# Patient Record
Sex: FEMALE | Race: WHITE | NOT HISPANIC OR LATINO | Employment: PART TIME | URBAN - METROPOLITAN AREA
[De-identification: names, ages, dates, MRNs, and addresses within clinical notes are randomized per-mention and may not be internally consistent; named-entity substitution may affect disease eponyms.]

---

## 2017-03-16 ENCOUNTER — TRANSFERRED RECORDS (OUTPATIENT)
Dept: HEALTH INFORMATION MANAGEMENT | Facility: CLINIC | Age: 47
End: 2017-03-16

## 2017-09-27 ENCOUNTER — MEDICAL CORRESPONDENCE (OUTPATIENT)
Dept: HEALTH INFORMATION MANAGEMENT | Facility: CLINIC | Age: 47
End: 2017-09-27

## 2017-10-30 ENCOUNTER — OFFICE VISIT (OUTPATIENT)
Dept: NEUROSURGERY | Facility: CLINIC | Age: 47
End: 2017-10-30

## 2017-10-30 VITALS
HEIGHT: 66 IN | OXYGEN SATURATION: 97 % | TEMPERATURE: 98.4 F | BODY MASS INDEX: 27.1 KG/M2 | SYSTOLIC BLOOD PRESSURE: 130 MMHG | DIASTOLIC BLOOD PRESSURE: 86 MMHG | HEART RATE: 94 BPM | RESPIRATION RATE: 20 BRPM | WEIGHT: 168.6 LBS

## 2017-10-30 DIAGNOSIS — M54.42 CHRONIC RIGHT-SIDED LOW BACK PAIN WITH BILATERAL SCIATICA: Primary | ICD-10-CM

## 2017-10-30 DIAGNOSIS — M54.41 CHRONIC RIGHT-SIDED LOW BACK PAIN WITH BILATERAL SCIATICA: Primary | ICD-10-CM

## 2017-10-30 DIAGNOSIS — G89.29 CHRONIC RIGHT-SIDED LOW BACK PAIN WITH BILATERAL SCIATICA: Primary | ICD-10-CM

## 2017-10-30 RX ORDER — TRAZODONE HYDROCHLORIDE 50 MG/1
1-2 TABLET, FILM COATED ORAL DAILY
COMMUNITY
Start: 2016-11-30 | End: 2018-06-28

## 2017-10-30 RX ORDER — ALBUTEROL SULFATE 90 UG/1
2 AEROSOL, METERED RESPIRATORY (INHALATION) PRN
COMMUNITY
Start: 2017-02-13

## 2017-10-30 RX ORDER — BUPROPION HYDROCHLORIDE 300 MG/1
300 TABLET ORAL DAILY
COMMUNITY
Start: 2017-10-03 | End: 2018-01-12

## 2017-10-30 RX ORDER — MULTIVIT WITH CALCIUM,IRON,MIN 18MG-0.4MG
1 TABLET ORAL EVERY MORNING
COMMUNITY

## 2017-10-30 RX ORDER — DEXTROAMPHETAMINE SACCHARATE, AMPHETAMINE ASPARTATE MONOHYDRATE, DEXTROAMPHETAMINE SULFATE AND AMPHETAMINE SULFATE 7.5; 7.5; 7.5; 7.5 MG/1; MG/1; MG/1; MG/1
20 CAPSULE, EXTENDED RELEASE ORAL EVERY MORNING
COMMUNITY
Start: 2016-11-30

## 2017-10-30 ASSESSMENT — PAIN SCALES - GENERAL: PAINLEVEL: MODERATE PAIN (5)

## 2017-10-30 NOTE — LETTER
"10/30/2017     RE: Phyllis Vela  [address] [facility] [address]     Dear Colleague,    Thank you for referring your patient, Phyllis Vela, to the [facility] NEUROSURGERY at [facility]. Please see a copy of my visit note below.    HISTORY AND PHYSICAL EXAM    Chief Complaint   Patient presents with     Consult     Chronic back pain s/p L4-5 right hemilaminectomy        HISTORY OF PRESENT ILLNESS  We saw Ms. Phyllis Vela today in Neurosurgery Clinic for the first time. She is a 47 year old female with a history of chronic back pain. There were no source images available at the time of this visit and much of the history was per the patient. Her pain began many years ago, but this was initially tolerable. This pain worsened following a L4-5 right hemilaminectomy in 2015 for disc herniation secondary to degenerative disc disease. She states she had disc reherniation and now her pain is worse than before her surgery. She has radiation into her right leg that is equally as bad as her back pain. She typically receives 4 injections per year at L5-S1 joint that gives her good relief. When the injections \"wear off\" she describes lack of bladder control with episodes of urinary incontinence. Starting this year, she has intermittent left leg numbness that began following mechanical injuries. This numbness is over the anterolateral thigh bilaterally. She has no groin or abdomen numbness.    She was followed by Dr. Arias who tried her on amitriptyline and nortriptyline. She states this was the best for the control of her pain, but stopped due to side-effects including hair loss and weight gain. She was also seen by Dr. Humphrey Degroot.  She was starting to be [facility], but during this process her insurance changed and this was no longer covered. She has not been seen since April due to life events, but is here now to establish care in " her new health insurance system. She has no physicians currently managing her pain.        Past Medical History:   Diagnosis Date     ADD (attention deficit disorder) 2013     ADHD (attention deficit hyperactivity disorder)      Chronic back pain 2012     DDD (degenerative disc disease), lumbar 2011     Depressive disorder      Generalized anxiety disorder 2011     Head injury     concussion as a child     Hyperglycemia 2010     Hyperglyceridemia 2010     Insomnia 10/31/2013     Lumbar stenosis 2011       Past Surgical History:   Procedure Laterality Date     BACK SURGERY Right 2015    Right L4/5 hemilaminectomy, discectomy     BREAST SURGERY Right 06/10/2004    breast biopsy      SECTION        SECTION        SECTION       CHOLECYSTECTOMY         No family history on file.    Social History     Social History     Marital status:      Spouse name: N/A     Number of children: N/A     Years of education: N/A     Occupational History     Not on file.     Social History Main Topics     Smoking status: Current Every Day Smoker     Packs/day: 0.50     Types: Cigarettes     Start date: 10/30/1990     Smokeless tobacco: Never Used     Alcohol use 1.2 oz/week     2 Cans of beer per week      Comment: WEEKLY     Drug use: No     Sexual activity: Yes     Partners: Male     Other Topics Concern     Not on file     Social History Narrative          Allergies   Allergen Reactions     Sulfa Drugs Hives     Amitriptyline      Other reaction(s): Other, see comments  Hair loss     Lyrica [Pregabalin]      Other reaction(s): Edema,generalized  Other reaction(s): Edema     Nortriptyline      Other reaction(s): Alopecia  Other reaction(s): Other, see comments  Hairloss, increased blood pressure per patient     Amoxicillin Rash     And itching      Clindamycin Rash     Airway impermeant      Gabapentin Rash       Current Outpatient Prescriptions    Medication     buPROPion (WELLBUTRIN XL) 300 MG 24 hr tablet     traZODone (DESYREL) 50 MG tablet     amphetamine-dextroamphetamine (ADDERALL XR) 30 MG per 24 hr capsule     CHROMIUM PO     albuterol (PROAIR HFA/PROVENTIL HFA/VENTOLIN HFA) 108 (90 BASE) MCG/ACT Inhaler     Multiple Vitamins-Minerals (QC WOMENS DAILY MULTIVITAMIN) TABS     cholecalciferol (VITAMIN D3) 1000 UNIT tablet     Calcium-Magnesium-Zinc 333-133-5 MG TABS per tablet     fluticasone (FLONASE) 50 MCG/ACT nasal spray     topiramate (TOPAMAX) 25 MG tablet     cyclobenzaprine (FLEXERIL) 5 MG tablet     No current facility-administered medications for this visit.          REVIEW OF SYSTEMS:  General: Negative for chills/sweats/fever. difficulty sleeping, headache, recent fatigue, or weight gain/loss.  Eyes: Negative for blurred vision, crossed eyes, double vision, recent eye infections, vision flashes, or vision halos.  Ears/Nose/Mouth/Throat: POSITIVE for nosebleeds and sinus problems. Negative for bleeding gums, difficulty swallowing, earache, ear discharge, hearing loss, hoarseness, or tinnitus.  Respiratory:Negative for chronic cough, coughing blood, night sweats, shortness of breath, Tuberculosis, or wheezing.  Cardiovascular: Negative for chest pain, dyspnea at night, heart murmur, palpitations, pacemaker, pacemaker, poor circulation, swollen legs/feet, or varicose veins.  Gastrointestinal: Negative for melena, hematochezia, chronic diarrhea, heartburn, Hepatitis A/B/C, increasing constipation, Liver Disease, nausea, or vomiting.   Genitourinary: POSITIVE for urinary urgency. Negative for Urinary retention, genital discharge, urinary incontinence, or UTI.   Neurological: POSITIVE for numbness in legs. Negative for syncope, headaches, numbness of arms, tingling in hands/arms/legs, memory problems, or seizures.  Psychological: Negative for anxiety, depression, panic attacks, or restlessness.  Skin: Negative for chronic skin itching, color  "changes in hand/feet when cold, poor scarring, non-healing ulcers, skin rashes/hives, unusual moles.  Musculoskeletal: Negative for arthritis, joint swelling in hands/wrists/hips/knees/joints, muscle tenderness in arms/legs, or osteoporosis.  Endocrine: POSITIVE for multiple broken bones (teeth). Negative for excessive thirst/hunger, intolerance for warm rooms, loss of libido, rapid weight gain/loss, galactorrhea, or thyroid issues.  Hematologic/Lymphatic: Negative for easy skin bruising, significant fatigue, prolonged bleeding, tender glands/lymph nodes.  Allergies: POSITIVE for hay fever. Negative for asthma.      PHYSICAL EXAM  /86  Pulse 94  Temp 98.4  F (36.9  C) (Oral)  Resp 20  Ht 1.676 m (5' 6\")  Wt 76.5 kg (168 lb 9.6 oz)  SpO2 97%  Breastfeeding? No  BMI 27.21 kg/m2    General: Awake, alert, oriented. Well nourished, well developed, she is not in any acute distress.  HEENT: Head normocephalic, atraumatic. No carotid bruit. Neck supple. Good range of motion. No palpable thyroid mass.  Heart: Regular rhythm and rate. No murmurs.  Lungs: Clear to auscultation and percussion bilaterally. No ronchi, rales or wheeze.  Abdomen: Soft, non-tender, non-distended. No hepatosplenomegaly.  Extremity: Warm with no clubbing or cyanosis. No lower extremity edema.  Incisions: 3 cm long right paraspinal incision well-healed.    Neurological  Awake, alert and oriented to date, time, place and person. Speech fluent.   Pupils equal, round, reactive to light.  Extraocular movement intact.  Visual Fields are full on confrontation.  Hearing is grossly normal to finger rub.   Facial sensation intact.  Face symmetric.  Tongue midline.  Uvula elevates equally.    Motor: full strength throughout.  Sensation: intact to light touch and pinpoint.  Deep tendon reflexes: 2+ throughout. 2 beat clonus bilaterally. Negative for Pérez's sign. No dysmetria.      ASSESSMENT  47 year old female with a history of chronic low back " pain s/p right L4-5 hemilaminectomy.  Chronic back pain and radiating pain to the right leg.    Patient presents for her above complaints and wishes to establish care now that she has a different insurance plan.  She does not have any physicians or healthcare providers who are managing her pain.    She does have a history of lumbar disc surgery and recurrence of her symptoms.  She was evaluated by Dr. Arias who was not planning any surgical intervention and was treating her conservatively.  He recommended SCS.  No surgical intervention for her lumbar spine was recommended.    I discussed her overall condition and the need for a pain management physician who can manage her pain.  She apparently had good pain control with amitriptyline and nortriptyline.  She may have other oral options which may be helpful for her and allow her to avoid more invasive therapy.    Her imaging should be reviewed to make sure that there are no findings which require surgical attention.  We will try to obtain her images.    We will refer her to our pain management clinic with either Dr. Ochoa or Dr. Aponte to establish care and to proceed with her L4-5, L5-S1 or right SI joint injections.    If they believe she would be a good candidate for spinal cord stimulator implantation and she has exhausted all conservative interventions/options, she will be referred to neuropsych so that the spinal cord stimulation workup can be initiated.      PLAN  1. Referral to Dr. Ochoa or Dr. Aponte for pain management.  Patient needs a pain management physician.  2. Obtain images from her previous facilities.    IAngel, am serving as a scribe to document services personally performed by Felix Floyd MD, PhD, based upon my observations and the provider's statements to me. All documentation has been reviewed and edited by the aforementioned doctor prior to being entered into the official medical record.    I, Felix Floyd, attest that  above named individual is acting in scribe capacity, has observed my performance of the services and has documented them in accordance with my direction. The documentation recorded by the scribe accurately reflects the service I personally performed and the decisions made by me. The document was also partially recorded by me and the entire document was edited by me as well.       65 minutes were spent face to face with the patient of which more than 50% of the time was spent counseling and discussing the above issues regarding diagnosis, differential, treatment options, and steps for further evaluation.      ADDENDUM    IMAGING STUDIES    MRI lumbar spine without/with contrast 6/5/2015 - No official read is available.  Appears to be postoperative.  There is superficial and possibly suprafascial metal artifact (?) on the right side of midline at L4/5 level.  There is disc herniation and perhaps extruded disc at the L4/5 level which is directed inferiorly and towards the right side.  This is smaller than her previous MRI imaging.    MRI lumbar spine without/with contrast 3/16/2015 - No official read is available.  Very large disc herniation and possibly extrusion into the canal at L4/5 level which is taking up significant room in the lumbar spinal canal.  It appears to be more so on the right side but it does take up the majority of the canal.  It is also inferiorly directed from the L4/5 disc space.    She has no recent imaging and MRI lumbar spine appears to be dated back to the time of her surgery.  The herniated and extruded disc at L4/5 was significantly reduced in size postoperatively.  I'm not sure if her treating surgeon addressed this and deemed it non-operative.  There is also a metal artifact or some sort of artifact at the site of her surgery.  As ongoing workup, if she is to have the spinal cord stimulator placed, she may need another MRI lumbar spine and thoracic/cervical spine as well.  She may also  benefit from x-ray of the lower lumbar spine, maybe CT lumbar spine, to look for any foreign objects.    Again, thank you for allowing me to participate in the care of your patient.      Sincerely,    Felix Floyd MD

## 2017-10-30 NOTE — MR AVS SNAPSHOT
"              After Visit Summary   10/30/2017    Phyllis Vela    MRN: [id number]           Patient Information     Date Of Birth          1970        Visit Information        Provider Department      10/30/2017 9:30 AM Felix Floyd MD [facility] Neurosurgery        Today's Diagnoses     Chronic back pain    -  1       Follow-ups after your visit        Additional Services     [facility]       Please call [phone] to make an appointment. Clinic is located: [facility] [address] [place] [address] [id number] [place] [address]      Please complete the following questions:    Procedure/Referral: Right L4-L5, Right L5-S1 or SI injections and overall pain management care    What is your diagnosis for the patient's pain? history of chronic back pain. This pain worsened following a L4-5 right hemilaminectomy in 2015. She states she had disc reherniation and now her pain is worse than before her surgery. She has radiation into her right leg that is equally as bad as her back pain. She typically receives 4 injections per year at L5-S1 that gives her good relief. When the injects \"wear off\" she has increased urinary incontinence. Starting this year, she has intermittent left leg numbness that began following falls. Front. back thight and foot. No groin or abdomen numb. Now left numbness following falls this year. Bladder incontinence.She was followed by Dr. Arias to consider SCS. amatryptoline reduced pain but didn't like side effects. nortriptoline with same results. Right para spnal 3 cm long well healed    What are your specific questions for the pain specialist? Pt would like pain management as well as injections    Are there any red flags that may impact the assessment or management of the patient? None            NEUROPSYCHOLOGY REFERRAL       Your provider has referred you to:    [facility]: Adult Neuropsychology Clinic - [place] [phone] Preferred " Provider: Tala Green Ph.D., LP, ABPP   [email or web address] for spinal cord stimulator evaluation    All scheduling is subject to the client's specific insurance plan & benefits, provider/location availability, and provider clinical specialities.  Please arrive 15 minutes early for your first appointment and bring your completed paperwork.    Please be aware that coverage of these services is subject to the terms and limitations of your health insurance plan.  Call member services at your health plan with any benefit or coverage questions.    Please bring the following to your appointment:  >>   Any x-rays, CTs or MRIs which have been performed.  Contact the facility where they were done to arrange for  prior to your scheduled appointment.  Any new CT, MRI or other procedures ordered by your specialist must be performed at a [facility] or coordinated by your clinic's referral office.    >>   List of current medications   >>   This referral request   >>   Any documents/labs given to you for this referral                  Your next 10 appointments already scheduled     Nov 17, 2017  8:20 AM CST   (Arrive by 8:05 AM)   New Patient Visit with Felix Aponte MD   [facility] ([facility])    [address] [place] [id number] [phone]              Who to contact     Please call your clinic at [phone] to:    Ask questions about your health    Make or cancel appointments    Discuss your medicines    Learn about your test results    Speak to your doctor   If you have compliments or concerns about an experience at your clinic, or if you wish to file a complaint, please contact [facility] Patient Relations at [phone] or email us at [email or web address]         Additional Information About Your Visit        MyChart  "Information     [employer] gives you secure access to your electronic health record. If you see a primary care provider, you can also send messages to your care team and make appointments. If you have questions, please call your primary care clinic.  If you do not have a primary care provider, please call [phone] and they will assist you.      [employer] is an electronic gateway that provides easy, online access to your medical records. With [employer], you can request a clinic appointment, read your test results, renew a prescription or communicate with your care team.     To access your existing account, please contact your [facility] or call [phone] for assistance.        Care EveryWhere ID     This is your Care EveryWhere ID. This could be used by other organizations to access your [place] medical records  [id number]        Your Vitals Were     Pulse Temperature Respirations Height Pulse Oximetry Breastfeeding?    94 98.4  F (36.9  C) (Oral) 20 1.676 m (5' 6\") 97% No    BMI (Body Mass Index)                   27.21 kg/m2            Blood Pressure from Last 3 Encounters:   10/30/17 130/86   12/10/14 (!) 136/92    Weight from Last 3 Encounters:   10/30/17 76.5 kg (168 lb 9.6 oz)              We Performed the Following     [facility]     NEUROPSYCHOLOGY REFERRAL        Primary Care Provider Office Phone # Fax #    [facility] [phone] [address] [place] [address]        Equal Access to Services     ОЛЕГ TARANGO AH: Hadii cece gilmano Socaio, waaxda luqadaha, qaybta kaalmada adeegyada, janine pearl. So [facility] [phone].    ATENCIÓN: Si habla español, tiene a watson disposición servicios gratuitos de asistencia lingüística. Llame al [phone].    We comply with applicable federal civil rights laws and [place] laws. We do not discriminate on the basis of race, color, " national origin, age, disability, sex, sexual orientation, or gender identity.            Thank you!     Thank you for choosing [facility]  for your care. Our goal is always to provide you with excellent care. Hearing back from our patients is one way we can continue to improve our services. Please take a few minutes to complete the written survey that you may receive in the mail after your visit with us. Thank you!             Your Updated Medication List - Protect others around you: Learn how to safely use, store and throw away your medicines at www.disposemymeds.org.          This list is accurate as of: 10/30/17 10:27 AM.  Always use your most recent med list.                   Brand Name Dispense Instructions for use Diagnosis    albuterol 108 (90 BASE) MCG/ACT Inhaler    PROAIR HFA/PROVENTIL HFA/VENTOLIN HFA     Inhale 2 puffs into the lungs as needed        amphetamine-dextroamphetamine 30 MG per 24 hr capsule    ADDERALL XR     Take 1 capsule by mouth as needed        buPROPion 300 MG 24 hr tablet    WELLBUTRIN XL     Take 300 mg by mouth daily        Calcium-Magnesium-Zinc 333-133-5 MG Tabs per tablet      Take 1 tablet by mouth daily        cholecalciferol 1000 UNIT tablet    vitamin D3     Take 1,000 Units by mouth daily        CHROMIUM PO      Take 1 tablet by mouth daily        fluticasone 50 MCG/ACT spray    FLONASE     Spray 2 sprays into both nostrils daily        QC WOMENS DAILY MULTIvitamin Tabs      Take 1 tablet by mouth daily        traZODone 50 MG tablet    DESYREL     Take 1-2 tablets by mouth daily

## 2017-10-30 NOTE — PROGRESS NOTES
"HISTORY AND PHYSICAL EXAM    Chief Complaint   Patient presents with     Consult     Chronic back pain s/p L4-5 right hemilaminectomy        HISTORY OF PRESENT ILLNESS  We saw Ms. Phyllis Vela today in [facility] for the first time. She is a 47 year old female with a history of chronic back pain. There were no source images available at the time of this visit and much of the history was per the patient. Her pain began many years ago, but this was initially tolerable. This pain worsened following a L4-5 right hemilaminectomy in 2015 for disc herniation secondary to degenerative disc disease. She states she had disc reherniation and now her pain is worse than before her surgery. She has radiation into her right leg that is equally as bad as her back pain. She typically receives 4 injections per year at L5-S1 joint that gives her good relief. When the injections \"wear off\" she describes lack of bladder control with episodes of urinary incontinence. Starting this year, she has intermittent left leg numbness that began following mechanical injuries. This numbness is over the anterolateral thigh bilaterally. She has no groin or abdomen numbness.    She was followed by Dr. Arias who tried her on amitriptyline and nortriptyline. She states this was the best for the control of her pain, but stopped due to side-effects including hair loss and weight gain. She was also seen by Dr. Humphrey Degroot.  She was starting to be evaluated for spinal cord stimulator with [facility], but during this process her insurance changed and this was no longer covered. She has not been seen since April due to life events, but is here now to establish care in her new health insurance system. She has no physicians currently managing her pain.        Past Medical History:   Diagnosis Date     ADD (attention deficit disorder) 03/07/2013     ADHD (attention deficit hyperactivity disorder)      Chronic back pain 02/07/2012     DDD " (degenerative disc disease), lumbar 2011     Depressive disorder      Generalized anxiety disorder 2011     Head injury     concussion as a child     Hyperglycemia 2010     Hyperglyceridemia 2010     Insomnia 10/31/2013     Lumbar stenosis 2011       Past Surgical History:   Procedure Laterality Date     BACK SURGERY Right 2015    Right L4/5 hemilaminectomy, discectomy     BREAST SURGERY Right 06/10/2004    breast biopsy      SECTION        SECTION        SECTION       CHOLECYSTECTOMY         No family history on file.    Social History     Social History     Marital status:      Spouse name: N/A     Number of children: N/A     Years of education: N/A     Occupational History     Not on file.     Social History Main Topics     Smoking status: Current Every Day Smoker     Packs/day: 0.50     Types: Cigarettes     Start date: 10/30/1990     Smokeless tobacco: Never Used     Alcohol use 1.2 oz/week     2 Cans of beer per week      Comment: WEEKLY     Drug use: No     Sexual activity: Yes     Partners: Male     Other Topics Concern     Not on file     Social History Narrative          Allergies   Allergen Reactions     Sulfa Drugs Hives     Amitriptyline      Other reaction(s): Other, see comments  Hair loss     Lyrica [Pregabalin]      Other reaction(s): Edema,generalized  Other reaction(s): Edema     Nortriptyline      Other reaction(s): Alopecia  Other reaction(s): Other, see comments  Hairloss, increased blood pressure per patient     Amoxicillin Rash     And itching      Clindamycin Rash     Airway impermeant      Gabapentin Rash       Current Outpatient Prescriptions   Medication     buPROPion (WELLBUTRIN XL) 300 MG 24 hr tablet     traZODone (DESYREL) 50 MG tablet     amphetamine-dextroamphetamine (ADDERALL XR) 30 MG per 24 hr capsule     CHROMIUM PO     albuterol (PROAIR HFA/PROVENTIL HFA/VENTOLIN HFA) 108 (90 BASE) MCG/ACT Inhaler      Multiple Vitamins-Minerals (QC WOMENS DAILY MULTIVITAMIN) TABS     cholecalciferol (VITAMIN D3) 1000 UNIT tablet     Calcium-Magnesium-Zinc 333-133-5 MG TABS per tablet     fluticasone (FLONASE) 50 MCG/ACT nasal spray     topiramate (TOPAMAX) 25 MG tablet     cyclobenzaprine (FLEXERIL) 5 MG tablet     No current facility-administered medications for this visit.          REVIEW OF SYSTEMS:  General: Negative for chills/sweats/fever. difficulty sleeping, headache, recent fatigue, or weight gain/loss.  Eyes: Negative for blurred vision, crossed eyes, double vision, recent eye infections, vision flashes, or vision halos.  Ears/Nose/Mouth/Throat: POSITIVE for nosebleeds and sinus problems. Negative for bleeding gums, difficulty swallowing, earache, ear discharge, hearing loss, hoarseness, or tinnitus.  Respiratory:Negative for chronic cough, coughing blood, night sweats, shortness of breath, Tuberculosis, or wheezing.  Cardiovascular: Negative for chest pain, dyspnea at night, heart murmur, palpitations, pacemaker, pacemaker, poor circulation, swollen legs/feet, or varicose veins.  Gastrointestinal: Negative for melena, hematochezia, chronic diarrhea, heartburn, Hepatitis A/B/C, increasing constipation, Liver Disease, nausea, or vomiting.   Genitourinary: POSITIVE for urinary urgency. Negative for Urinary retention, genital discharge, urinary incontinence, or UTI.   Neurological: POSITIVE for numbness in legs. Negative for syncope, headaches, numbness of arms, tingling in hands/arms/legs, memory problems, or seizures.  Psychological: Negative for anxiety, depression, panic attacks, or restlessness.  Skin: Negative for chronic skin itching, color changes in hand/feet when cold, poor scarring, non-healing ulcers, skin rashes/hives, unusual moles.  Musculoskeletal: Negative for arthritis, joint swelling in hands/wrists/hips/knees/joints, muscle tenderness in arms/legs, or osteoporosis.  Endocrine: POSITIVE for multiple  "broken bones (teeth). Negative for excessive thirst/hunger, intolerance for warm rooms, loss of libido, rapid weight gain/loss, galactorrhea, or thyroid issues.  Hematologic/Lymphatic: Negative for easy skin bruising, significant fatigue, prolonged bleeding, tender glands/lymph nodes.  Allergies: POSITIVE for hay fever. Negative for asthma.      PHYSICAL EXAM  /86  Pulse 94  Temp 98.4  F (36.9  C) (Oral)  Resp 20  Ht 1.676 m (5' 6\")  Wt 76.5 kg (168 lb 9.6 oz)  SpO2 97%  Breastfeeding? No  BMI 27.21 kg/m2    General: Awake, alert, oriented. Well nourished, well developed, she is not in any acute distress.  HEENT: Head normocephalic, atraumatic. No carotid bruit. Neck supple. Good range of motion. No palpable thyroid mass.  Heart: Regular rhythm and rate. No murmurs.  Lungs: Clear to auscultation and percussion bilaterally. No ronchi, rales or wheeze.  Abdomen: Soft, non-tender, non-distended. No hepatosplenomegaly.  Extremity: Warm with no clubbing or cyanosis. No lower extremity edema.  Incisions: 3 cm long right paraspinal incision well-healed.    Neurological  Awake, alert and oriented to date, time, place and person. Speech fluent.   Pupils equal, round, reactive to light.  Extraocular movement intact.  Visual Fields are full on confrontation.  Hearing is grossly normal to finger rub.   Facial sensation intact.  Face symmetric.  Tongue midline.  Uvula elevates equally.    Motor: full strength throughout.  Sensation: intact to light touch and pinpoint.  Deep tendon reflexes: 2+ throughout. 2 beat clonus bilaterally. Negative for Pérez's sign. No dysmetria.      ASSESSMENT  47 year old female with a history of chronic low back pain s/p right L4-5 hemilaminectomy.  Chronic back pain and radiating pain to the right leg.    Patient presents for her above complaints and wishes to establish care now that she has a different insurance plan.  She does not have any physicians or healthcare providers who " are managing her pain.    She does have a history of lumbar disc surgery and recurrence of her symptoms.  She was evaluated by Dr. Arias who was not planning any surgical intervention and was treating her conservatively.  He recommended SCS.  No surgical intervention for her lumbar spine was recommended.    I discussed her overall condition and the need for a pain management physician who can manage her pain.  She apparently had good pain control with amitriptyline and nortriptyline.  She may have other oral options which may be helpful for her and allow her to avoid more invasive therapy.    Her imaging should be reviewed to make sure that there are no findings which require surgical attention.  We will try to obtain her images.    We will refer her to our pain management clinic with either Dr. Ochoa or Dr. Aponte to establish care and to proceed with her L4-5, L5-S1 or right SI joint injections.    If they believe she would be a good candidate for spinal cord stimulator implantation and she has exhausted all conservative interventions/options, she will be referred to neuropsych so that the spinal cord stimulation workup can be initiated.      PLAN  1. Referral to Dr. Ochoa or Dr. Aponte for pain management.  Patient needs a pain management physician.  2. Obtain images from her previous facilities.    IAngel, am serving as a scribe to document services personally performed by Felix Floyd MD, PhD, based upon my observations and the provider's statements to me. All documentation has been reviewed and edited by the aforementioned doctor prior to being entered into the official medical record.    I, Felix Floyd, attest that above named individual is acting in scribe capacity, has observed my performance of the services and has documented them in accordance with my direction. The documentation recorded by the scribe accurately reflects the service I personally performed and the decisions made by  me. The document was also partially recorded by me and the entire document was edited by me as well.       65 minutes were spent face to face with the patient of which more than 50% of the time was spent counseling and discussing the above issues regarding diagnosis, differential, treatment options, and steps for further evaluation.      ADDENDUM    IMAGING STUDIES    MRI lumbar spine without/with contrast 6/5/2015 - No official read is available.  Appears to be postoperative.  There is superficial and possibly suprafascial metal artifact (?) on the right side of midline at L4/5 level.  There is disc herniation and perhaps extruded disc at the L4/5 level which is directed inferiorly and towards the right side.  This is smaller than her previous MRI imaging.    MRI lumbar spine without/with contrast 3/16/2015 - No official read is available.  Very large disc herniation and possibly extrusion into the canal at L4/5 level which is taking up significant room in the lumbar spinal canal.  It appears to be more so on the right side but it does take up the majority of the canal.  It is also inferiorly directed from the L4/5 disc space.    She has no recent imaging and MRI lumbar spine appears to be dated back to the time of her surgery.  The herniated and extruded disc at L4/5 was significantly reduced in size postoperatively.  I'm not sure if her treating surgeon addressed this and deemed it non-operative.  There is also a metal artifact or some sort of artifact at the site of her surgery.  As ongoing workup, if she is to have the spinal cord stimulator placed, she may need another MRI lumbar spine and thoracic/cervical spine as well.  She may also benefit from x-ray of the lower lumbar spine, maybe CT lumbar spine, to look for any foreign objects.

## 2017-11-10 ENCOUNTER — OFFICE VISIT (OUTPATIENT)
Dept: NEUROPSYCHOLOGY | Facility: CLINIC | Age: 47
End: 2017-11-10

## 2017-11-10 DIAGNOSIS — M54.41 CHRONIC RIGHT-SIDED LOW BACK PAIN WITH BILATERAL SCIATICA: ICD-10-CM

## 2017-11-10 DIAGNOSIS — M54.42 CHRONIC RIGHT-SIDED LOW BACK PAIN WITH BILATERAL SCIATICA: ICD-10-CM

## 2017-11-10 DIAGNOSIS — G89.29 CHRONIC RIGHT-SIDED LOW BACK PAIN WITH BILATERAL SCIATICA: ICD-10-CM

## 2017-11-10 DIAGNOSIS — F54 PSYCHOLOGICAL FACTORS AFFECTING MEDICAL CONDITION: Primary | ICD-10-CM

## 2017-11-10 NOTE — TELEPHONE ENCOUNTER
APPT INFO    Date /Time: 11/17/17 8:20AM    Reason for Appt: Chronic back pain    Ref Provider/Clinic: [facility]    Are there internal records? If yes, list: [facility] (referring)    Patient Contact (Y/N) & Call Details: No, recs at .    Action: Faxed cover sheet to  to re. recs      OUTSIDE RECORDS CHECKLIST     CLINIC NAME COMMENTS REC (x) IMG (x)   [facility]  Some transferred recs scanned in Epic 3/2017  Faxed cover sheet to  to re. recs  NEED - L4-5 right hemilaminectomy in 2015

## 2017-11-10 NOTE — MR AVS SNAPSHOT
After Visit Summary   11/10/2017    Phyllis Vela    MRN: [id number]           Patient Information     Date Of Birth          1970        Visit Information        Provider Department      11/10/2017 9:15 AM Tala Green, PhD [facility] Neuropsychology        Today's Diagnoses     Psychological factors affecting medical condition    -  1    Chronic right-sided low back pain with bilateral sciatica           Follow-ups after your visit        Your next 10 appointments already scheduled     Dec 29, 2017   Procedure with Felix Aponte MD   [facility] ([facility])    [address] [place] [address]  5th Floor  [facility] [address] [phone]           Located in the Clinics and Surgery Center at [address] [place] [address].   parking is very convenient and highly recommended.  is a $6 flat rate fee.  Both  and self parkers should enter the main arrival plaza from [facility]; parking attendants will direct you based on your parking preference.              Who to contact     Please call your clinic at [phone] to:    Ask questions about your health    Make or cancel appointments    Discuss your medicines    Learn about your test results    Speak to your doctor   If you have compliments or concerns about an experience at your clinic, or if you wish to file a complaint, please contact [facility] Patient Relations at [phone] or email us at [email or web address]         Additional Information About Your Visit        MyChart Information     [employer] gives you secure access to your electronic health record. If you see a primary care provider, you can also send messages to your care team and make appointments. If you have questions, please call your primary care clinic.  If you do not have a primary care provider, please call [phone] and they will  assist you.      [employer] is an electronic gateway that provides easy, online access to your medical records. With [employer], you can request a clinic appointment, read your test results, renew a prescription or communicate with your care team.     To access your existing account, please contact your [facility] or call [phone] for assistance.        Care EveryWhere ID     This is your Care EveryWhere ID. This could be used by other organizations to access your [place] medical records  [id number]         Blood Pressure from Last 3 Encounters:   11/17/17 130/82   10/30/17 130/86   12/10/14 (!) 136/92    Weight from Last 3 Encounters:   11/17/17 76.6 kg (168 lb 14.4 oz)   10/30/17 76.5 kg (168 lb 9.6 oz)              We Performed the Following     [id number] TEST BY PSYCHOLOGIST/MD, PER HR     C PSYCHIATRIC DIAGNOSTIC EVALUATION        Primary Care Provider Office Phone # Fax #    Litzy [facility] [phone] [address] [place] [address]        Equal Access to Services     [facility]: Hadii aad ku hadasho Soomaali, waaxda luqadaha, qaybta kaalmada adeegyada, waxay fransisco hayhernando womack . So [facility] [phone].    ATENCIÓN: Si habla español, tiene a watson disposición servicios gratuitos de asistencia lingüística. Llame al [phone].    We comply with applicable federal civil rights laws and [place] laws. We do not discriminate on the basis of race, color, national origin, age, disability, sex, sexual orientation, or gender identity.            Thank you!     Thank you for choosing [facility] NEUROPSYCHOLOGY  for your care. Our goal is always to provide you with excellent care. Hearing back from our patients is one way we can continue to improve our services. Please take a few minutes to complete the written survey that you may receive in the mail after your visit with us. Thank you!             Your Updated  Medication List - Protect others around you: Learn how to safely use, store and throw away your medicines at www.disposemymeds.org.          This list is accurate as of: 11/10/17 11:59 PM.  Always use your most recent med list.                   Brand Name Dispense Instructions for use Diagnosis    albuterol 108 (90 BASE) MCG/ACT Inhaler    PROAIR HFA/PROVENTIL HFA/VENTOLIN HFA     Inhale 2 puffs into the lungs as needed        amphetamine-dextroamphetamine 30 MG per 24 hr capsule    ADDERALL XR     Take 1 capsule by mouth as needed        buPROPion 300 MG 24 hr tablet    WELLBUTRIN XL     Take 300 mg by mouth daily        Calcium-Magnesium-Zinc 333-133-5 MG Tabs per tablet      Take 1 tablet by mouth daily        cholecalciferol 1000 UNIT tablet    vitamin D3     Take 1,000 Units by mouth daily        CHROMIUM PO      Take 1 tablet by mouth daily        fluticasone 50 MCG/ACT spray    FLONASE     Spray 2 sprays into both nostrils daily        QC WOMENS DAILY MULTIvitamin Tabs      Take 1 tablet by mouth daily        traZODone 50 MG tablet    DESYREL     Take 1-2 tablets by mouth daily

## 2017-11-11 ENCOUNTER — HEALTH MAINTENANCE LETTER (OUTPATIENT)
Age: 47
End: 2017-11-11

## 2017-11-13 NOTE — TELEPHONE ENCOUNTER
Records Received From:   [employer]     DATE/EXAM/LOCATION  (specify location if different)   Office Notes:  12/14/16, 1/20/17, 3/16/17   Radiology Reports: - xray lumbar 12/14/16   Missing: - lumbar spine image 3/16/15, 12/16/16  - right L4-5 microdiskectomy on 5/8/15  - MRI lumbar 10/5/15 [facility]   - injections

## 2017-11-14 ENCOUNTER — TELEPHONE (OUTPATIENT)
Dept: ANESTHESIOLOGY | Facility: CLINIC | Age: 47
End: 2017-11-14

## 2017-11-14 NOTE — TELEPHONE ENCOUNTER
LPN called pt to see if they were agreeable to change providers to the Nurse Practioner on 11/17.   Pt is a referral [place] for a Spinal Cord Stimulator, and has questions for Dr. Aponte specifically about the SCS trial.   LPN informed pt that they should keep their appointment with Dr. Aponte, as he will best be able to answer her questions.     Buffy Benedict LPN

## 2017-11-15 NOTE — TELEPHONE ENCOUNTER
Phone Call:    Who did you talk to? (or) Who did you call? Called out to pt    Call Detail/Action: Called and spoke with pt regarding injections, pt states they were done at [facility]. Emailed CHELI form for pt to fill out for me. ([email or web address])  Previous Recs at:   [facility]

## 2017-11-17 ENCOUNTER — OFFICE VISIT (OUTPATIENT)
Dept: ANESTHESIOLOGY | Facility: CLINIC | Age: 47
End: 2017-11-17

## 2017-11-17 ENCOUNTER — PRE VISIT (OUTPATIENT)
Dept: ANESTHESIOLOGY | Facility: CLINIC | Age: 47
End: 2017-11-17

## 2017-11-17 VITALS
SYSTOLIC BLOOD PRESSURE: 130 MMHG | BODY MASS INDEX: 27.14 KG/M2 | DIASTOLIC BLOOD PRESSURE: 82 MMHG | HEIGHT: 66 IN | WEIGHT: 168.9 LBS | HEART RATE: 88 BPM | OXYGEN SATURATION: 96 %

## 2017-11-17 DIAGNOSIS — Z72.0 TOBACCO ABUSE: ICD-10-CM

## 2017-11-17 DIAGNOSIS — M79.18 MYOFASCIAL PAIN SYNDROME: ICD-10-CM

## 2017-11-17 DIAGNOSIS — M79.2 NEUROPATHIC PAIN: ICD-10-CM

## 2017-11-17 DIAGNOSIS — R53.81 PHYSICAL DECONDITIONING: ICD-10-CM

## 2017-11-17 DIAGNOSIS — M46.1 SACROILIITIS (H): ICD-10-CM

## 2017-11-17 DIAGNOSIS — M96.1 POST LAMINECTOMY SYNDROME: Primary | ICD-10-CM

## 2017-11-17 RX ORDER — TOPIRAMATE 25 MG/1
25 TABLET, FILM COATED ORAL 2 TIMES DAILY
Qty: 60 TABLET | Refills: 2 | Status: SHIPPED | OUTPATIENT
Start: 2017-11-17 | End: 2018-02-15

## 2017-11-17 RX ORDER — CYCLOBENZAPRINE HCL 5 MG
5 TABLET ORAL
Qty: 30 TABLET | Refills: 2 | Status: SHIPPED | OUTPATIENT
Start: 2017-11-17 | End: 2018-01-12 | Stop reason: DRUGHIGH

## 2017-11-17 ASSESSMENT — ANXIETY QUESTIONNAIRES
5. BEING SO RESTLESS THAT IT IS HARD TO SIT STILL: NOT AT ALL
2. NOT BEING ABLE TO STOP OR CONTROL WORRYING: NOT AT ALL
1. FEELING NERVOUS, ANXIOUS, OR ON EDGE: NOT AT ALL
GAD7 TOTAL SCORE: 1
GAD7 TOTAL SCORE: 1
7. FEELING AFRAID AS IF SOMETHING AWFUL MIGHT HAPPEN: NOT AT ALL
GAD7 TOTAL SCORE: 1
6. BECOMING EASILY ANNOYED OR IRRITABLE: SEVERAL DAYS
7. FEELING AFRAID AS IF SOMETHING AWFUL MIGHT HAPPEN: NOT AT ALL
3. WORRYING TOO MUCH ABOUT DIFFERENT THINGS: NOT AT ALL
4. TROUBLE RELAXING: NOT AT ALL

## 2017-11-17 ASSESSMENT — ENCOUNTER SYMPTOMS
STIFFNESS: 0
SMELL DISTURBANCE: 0
MYALGIAS: 1
NECK PAIN: 0
BACK PAIN: 1
MUSCLE CRAMPS: 0
ARTHRALGIAS: 1
SINUS PAIN: 1
TASTE DISTURBANCE: 0
MUSCLE WEAKNESS: 1
JOINT SWELLING: 0
SINUS CONGESTION: 1
NECK MASS: 0

## 2017-11-17 ASSESSMENT — PAIN SCALES - GENERAL: PAINLEVEL: MODERATE PAIN (5)

## 2017-11-17 NOTE — PROGRESS NOTES
Subjective:    47 year old female with past medical history of depression, ADD, insomnia  presents for evaluation of low back pain, right buttocks pain and right posterior leg pain with radiation into the top of the right foot. Pain has been present for 8 years. Pain has gotten worse since that time.     This pain worsened following a L4-5 right hemilaminectomy in 2015 for disc herniation secondary to degenerative disc disease. She states she had disc reherniation and now her pain is worse than before her surgery. She has radiation into her right leg that is equally as bad as her back pain.    She has undergone L5-S1 TFESI in the past, which have been extremely helpful with pain relief.     The pain is achy, sharp with movement. The pain is constant. Pain can be severe at times, but waxes and wanes in severity. Average pain is 6/10. Pain is better with rest, medications. Worse with prolonged activity.     She underwent neuropsychological testing last week for SCS trial.    The patient denies focal lower extremity weakness. No lower extremity sensory changes. No incontinence of bowel or bladder.      Current treatments include:  Injections    Previous medication treatments included:    Anti-convulsants: Lyrica, Neurontin  Muscle relaxors: Flexeril  Anti-depressants: Amitriptyline, Nortriptyline  Opioids: several    Other treatments have included:    Phyllis Vela has been seen at a pain clinic in the past.  CDI    physical therapy: yes  Injections: yes    Past Medical History:   Diagnosis Date     ADHD (attention deficit hyperactivity disorder)      Depressive disorder     past medical history reviewed with patient.   Past Surgical History:   Procedure Laterality Date      SECTION       CHOLECYSTECTOMY      past surgical history reviewed with patient.   Medications:  Current Outpatient Prescriptions   Medication     buPROPion (WELLBUTRIN XL) 300 MG 24 hr tablet     traZODone (DESYREL) 50 MG tablet      "amphetamine-dextroamphetamine (ADDERALL XR) 30 MG per 24 hr capsule     CHROMIUM PO     albuterol (PROAIR HFA/PROVENTIL HFA/VENTOLIN HFA) 108 (90 BASE) MCG/ACT Inhaler     Multiple Vitamins-Minerals (QC WOMENS DAILY MULTIVITAMIN) TABS     cholecalciferol (VITAMIN D3) 1000 UNIT tablet     Calcium-Magnesium-Zinc 333-133-5 MG TABS per tablet     fluticasone (FLONASE) 50 MCG/ACT nasal spray     No current facility-administered medications for this visit.      [place] and [place] Prescription Monitoring Program reviewed    Allergies:     Allergies   Allergen Reactions     Sulfa Drugs Hives     Amitriptyline      Other reaction(s): Other, see comments  Hair loss     Lyrica [Pregabalin]      Other reaction(s): Edema,generalized  Other reaction(s): Edema     Nortriptyline      Other reaction(s): Alopecia  Other reaction(s): Other, see comments  Hairloss, increased blood pressure per patient     Amoxicillin Rash     And itching      Clindamycin Rash     Airway impermeant      Gabapentin Rash     Family History:  family history is not on file.    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 Alcohol: socially. Street drugs: none.     Review Of Systems    14 point ROS negative except per HPI    Objective:     /82  Pulse 88  Ht 1.676 m (5' 6\")  Wt 76.6 kg (168 lb 14.4 oz)  SpO2 96%  BMI 27.26 kg/m2  Body mass index is 27.26 kg/(m^2).      General: In no apparent distress  Mental status: Normal affect, pleasant  Head: Atraumatic, normocephalic  Eyes: Extra-ocular movements grossly intact, no scleral icterus  Cardiovascular: Regular rate  Respiratory: No respiratory distress  Abdomen: soft, non-distended  Msk: Bilateral hips, knees have normal range of motion. Pain with extension and rotation of lumbar spine  Neuro: AAOx3.   CN II-XII are grossly intact.   Back: tenderness to palpation over the lumbar parapsinous musculature, positive Juan's on the right, negative SLR bilaterally, " negative facet loading bilaterally, marked tenderness to palpation over the right SI joint  Skin: No rashes or lesions noted on exposed areas of skin  Lymph: no supraclavicular lymphadenopathy    Chaperone: Jay Jay Gomez RN    Assessment:    Phyllis is a 47-year-old female who presents to the pain clinic as a new patient consultation. Her main complaint at today's visit his low back pain and right lower extremity pain. Of note, Phyllis is being referred to the pain clinic by Dr. Felix Floyd in Neurosurgery. She underwent right-sided L4-L5 hemilaminectomy in 2015, which was not helpful for her pain symptomatology. Based upon history, physical exam, review of the medical record, laboratory studies and radiographs, the most likely diagnoses are neuropathic pain, myofascial pain syndrome, deconditioning, sacroiliitis, postlaminectomy syndrome, tobacco abuse and nociceptive pain. At this point, the majority of Phyllis's pain appears to be myofascial and nociceptive in nature with the likely etiology being a right sacroiliac joint. Plus, at this time, I would defer spinal cord stimulator trial and proceed with a right-sided SI joint injection. I will also came for review lumbar flexion and extension radiographs and I will initiate Phyllis on Topamax 25 mg b.i.d. since she responded very well to tricyclic antidepressant therapy; however, she was unable to continue that therapy secondary to side effects. Finally, she has responded very well to L5 transforaminal epidural injections at [facility] in the past; however, she reports having undergone bilateral transforaminal injections. My recommendation regarding this will be to proceed with interlaminar epidural injection if the symptoms are bilateral and to proceed with transforaminal injection if the symptoms are unilateral.    Plan:     1. Obtain lumbar flexion and extension x-rays  2. Start Topamax 25mg BID  3. Schedule right SI joint injection  4. Consider repeat L5-S1 TFESI vs.  interlaminar LESI in the future  5. Recommend deferring SCS trial at this time  6. Start Flexeril 5mg QHS PRN

## 2017-11-17 NOTE — PATIENT INSTRUCTIONS
1. Start taking topamax 25mg twice daily.  Please follow the titration instructions listed below:    Week 1: 0mg in AM 25mg at bedtime  Week 2: 25mg in AM 25mg at bedtime    2. Start taking flexeril 5mg at bedtime as needed for muscle spasms.     3. Xray of lumbar spine ordered.      IMAGING SERVICES HOURS:    All imaging modalities are available from 7 a.m. - 9 p.m. Monday through Friday  X-ray, CT, MRI, and General Ultrasound appointments are available from 7 a.m. -3:30 p.m. on Saturdays  X-ray, CT and MRI appointments are available from 8 a.m. - 4:30 p.m. on Sundays  Please call [phone] to schedule imaging exams    4. Schedule procedure.    Please call Trista at [phone] to schedule your procedure. She is available Monday - Friday from 9-5:30pm, if she is unavailable to take your call, please leave her a voice message with your name, birth date, and phone number and she will call you back.    Your procedure: Right sacroiliac joint injection    On the day of the procedure  1. Arrive 1 hour earlier than your scheduled time, to the [facility]  Address: [address] [place] [address], [place] [address]  2. Check in on the 5th floor for your procedure    A nurse will call you 2 weeks after your procedure to follow up.    If you must reschedule your procedure more than two times, you must follow up in clinic before rescheduling again.      Patient Pre-Procedure Instructions    CAUTION - FAILURE TO FOLLOW THESE PRE-PROCEDURE INSTRUCTIONS WILL RESULT IN YOUR PROCEDURE BEING RESCHEDULED.      Pregnancy  If you are pregnant, or think that you may be pregnant, please notify our staff. This may or may not affect the ability to perform the procedure.     You MUST have a  TO TAKE YOU HOME after your procedure. Transportation by Taxi or Para-transit must have a responsible adult accompany you home (other than the ). Travel by bus or light rail is not acceptable transportation. You must  provide your 's full name and contact number at time of check in.   Fasting Protocol You may have NOTHING SOLID TO EAT FOR 8 HOURS prior to arrival at the procedure area.   Broth and candy are considered solid food and require an eight hour fast.   You may have CLEAR LIQUIDS UP TO 2 HOURS prior to arrival at the clinic.   Clear liquids include water, clear fruit juice (no pulp) carbonated beverages, ice, black coffee, black tea (no milk or cream), chewing gum (un-swallowed), and/or clear jello (no fruit or milk). No alcohol containing beverages.   Medications If you take any medications,  DO NOT STOP. Take your medications as usual the morning of your procedure with a sip of water AT LEAST 2 HOURS PRIOR TO ARRIVAL.    Antibiotics If you are currently taking antibiotics, you must complete the entire dose 7 days prior to your scheduled procedure. You must be clear of any signs or symptoms of infection. If you begin antibiotics, please contact our clinic for instructions.   Fever, Chills, or Rash If you experience a fever of higher than 100 degrees, chills, rash, or open wounds during the one week prior to your procedure, please call the clinic.         Medication Hold List  **Patients under Cardiology/Neurology care should consult their provider prior to the pain procedure to verify pre-procedure medication instructions. The information below contains general guidelines.**    Blood Thinners If you are taking daily ASPIRIN, PLAVIX, OR OTHER BLOOD THINNERS SUCH AS COUMADIN/WARFARIN, we will need your prescribing doctor to sign a release permitting you to stop these medications. Once approved by your prescribing doctor - STOP ALL BLOOD THINNERS BASED ON THE TIME TABLE BELOW PRIOR TO YOUR PROCEDURE. If you have been instructed to stop WARFARIN(COUMADIN), you must have an INR lab drawn the day before your procedure. . Your INR must be within normal limits before we can perform your injection. MEDICATIONS CAN BE  RESTARTED AFTER YOUR PROCEDURE.    14 DAY HOLD  Ticlid (ticlopidine)    10 DAY HOLD  Effient (Prasugel)    3 DAY HOLD  Xarelto (rivaroxaban) 7 DAY HOLD  Anacin, Bufferin, Ecotrin, Excedrin, Aggrenox (Aspirin)  Brilinta (ticagrelor)  Coumadin (Warfarin)  Pradexa (Dabigatran)  Elmiron (Pentosan)  Plavix (Clopidogrel Bisulfate)  Pletal (Cilostazol)    24 DAY HOLD  Lovenox (enoxaparin)  Agrylin (Anagrelide)        Non-steroidal Anti-inflammatories (NSAIDs) DO NOT TAKE any non-steroidal anti-inflammatory medications (NSAIDs) listed on the table below. MEDICATIONS CAN BE RESTARTED AFTER YOUR PROCEDURE. Celebrex is OK to take and does not need to be discontinued.     Medications to stop:  3 DAY HOLD  Advil, Motrin (Ibuprofen)  Arthrotec (diciofenac sodium/misoprostol)  Clinoril (Sulindac)  Indocin (Indomethacin)  Lodine (Etodolac)  Toradol (Ketorolac)  Vicoprofen (Hydrocodone and Ibuprofen)  Voltaren (Diclotenac)    14 DAY HOLD  Daypro (Oxaprozin)  Feldene (Piroxicam)   7 DAY HOLD  Aleve (Naproxen sodium)  Darvon compound (contains aspirin)  Naprosyn (Naproxen)  Norgesic Forte (contains aspirin)  Mobic (Meloxicam)  Oruvall (Ketoprofen)  Percodan (contains aspirin)  Relafen (Nabumetone)  Salsalate  Trilisate  Vitamin E (more than 400 mg per day)  Any medication containing aspirin                To speak with a nurse, schedule/reschedule/cancel a clinic appointment, or request a medication refill call: [phone]     You can also reach us by [employer]: [email or web address]

## 2017-11-17 NOTE — TELEPHONE ENCOUNTER
Note:   Pt returned CHELI's to me but they are blurry, asked pt to re-send CHELI's to me yesterday. I have not received the forms, pt may have to sign once she arrives to the appt today.

## 2017-11-17 NOTE — LETTER
11/17/2017       RE: Phyllis Vela  [address] [facility] [address]     Dear Colleague,    Thank you for referring your patient, Phyllis Vela, to the [facility]NAG[facility] at [facility]. Please see a copy of my visit note below.    Subjective:    47 year old female with past medical history of depression, ADD, insomnia  presents for evaluation of low back pain, right buttocks pain and right posterior leg pain with radiation into the top of the right foot. Pain has been present for 8 years. Pain has gotten worse since that time.     This pain worsened following a L4-5 right hemilaminectomy in 2015 for disc herniation secondary to degenerative disc disease. She states she had disc reherniation and now her pain is worse than before her surgery. She has radiation into her right leg that is equally as bad as her back pain.    She has undergone L5-S1 TFESI in the past, which have been extremely helpful with pain relief.     The pain is achy, sharp with movement. The pain is constant. Pain can be severe at times, but waxes and wanes in severity. Average pain is 6/10. Pain is better with rest, medications. Worse with prolonged activity.     She underwent neuropsychological testing last week for SCS trial.    The patient denies focal lower extremity weakness. No lower extremity sensory changes. No incontinence of bowel or bladder.      Current treatments include:  Injections    Previous medication treatments included:    Anti-convulsants: Lyrica, Neurontin  Muscle relaxors: Flexeril  Anti-depressants: Amitriptyline, Nortriptyline  Opioids: several    Other treatments have included:    Phyllis Vela has been seen at a pain clinic in the past.  CDI    physical therapy: yes  Injections: yes    Past Medical History:   Diagnosis Date     ADHD (attention deficit hyperactivity disorder)      Depressive disorder     past medical history reviewed with  "patient.   Past Surgical History:   Procedure Laterality Date      SECTION       CHOLECYSTECTOMY      past surgical history reviewed with patient.   Medications:  Current Outpatient Prescriptions   Medication     buPROPion (WELLBUTRIN XL) 300 MG 24 hr tablet     traZODone (DESYREL) 50 MG tablet     amphetamine-dextroamphetamine (ADDERALL XR) 30 MG per 24 hr capsule     CHROMIUM PO     albuterol (PROAIR HFA/PROVENTIL HFA/VENTOLIN HFA) 108 (90 BASE) MCG/ACT Inhaler     Multiple Vitamins-Minerals (QC WOMENS DAILY MULTIVITAMIN) TABS     cholecalciferol (VITAMIN D3) 1000 UNIT tablet     Calcium-Magnesium-Zinc 333-133-5 MG TABS per tablet     fluticasone (FLONASE) 50 MCG/ACT nasal spray     No current facility-administered medications for this visit.      [place] and [place] Prescription Monitoring Program reviewed    Allergies:     Allergies   Allergen Reactions     Sulfa Drugs Hives     Amitriptyline      Other reaction(s): Other, see comments  Hair loss     Lyrica [Pregabalin]      Other reaction(s): Edema,generalized  Other reaction(s): Edema     Nortriptyline      Other reaction(s): Alopecia  Other reaction(s): Other, see comments  Hairloss, increased blood pressure per patient     Amoxicillin Rash     And itching      Clindamycin Rash     Airway impermeant      Gabapentin Rash     Family History:  family history is not on file.    Social history: She is not working () - welfare     Smoking: yes, 1/2 pdd x 25 years. Alcohol: socially. Street drugs: none.     Review Of Systems    14 point ROS negative except per HPI    Objective:     /82  Pulse 88  Ht 1.676 m (5' 6\")  Wt 76.6 kg (168 lb 14.4 oz)  SpO2 96%  BMI 27.26 kg/m2  Body mass index is 27.26 kg/(m^2).      General: In no apparent distress  Mental status: Normal affect, pleasant  Head: Atraumatic, normocephalic  Eyes: Extra-ocular movements grossly intact, no scleral icterus  Cardiovascular: Regular rate  Respiratory: No respiratory " distress  Abdomen: soft, non-distended  Msk: Bilateral hips, knees have normal range of motion. Pain with extension and rotation of lumbar spine  Neuro: AAOx3.   CN II-XII are grossly intact.   Back: tenderness to palpation over the lumbar parapsinous musculature, positive Juan's on the right, negative SLR bilaterally, negative facet loading bilaterally, marked tenderness to palpation over the right SI joint  Skin: No rashes or lesions noted on exposed areas of skin  Lymph: no supraclavicular lymphadenopathy    Chaperone: Jay Jay Gomez RN    Assessment:    Phyllis is a 47-year-old female who presents to the pain clinic as a new patient consultation. Her main complaint at today's visit his low back pain and right lower extremity pain. Of note, Phyllis is being referred to the pain clinic by Dr. Felix Floyd in Neurosurgery. She underwent right-sided L4-L5 hemilaminectomy in 2015, which was not helpful for her pain symptomatology. Based upon history, physical exam, review of the medical record, laboratory studies and radiographs, the most likely diagnoses are neuropathic pain, myofascial pain syndrome, deconditioning, sacroiliitis, postlaminectomy syndrome, tobacco abuse and nociceptive pain. At this point, the majority of Phyllis's pain appears to be myofascial and nociceptive in nature with the likely etiology being a right sacroiliac joint. Plus, at this time, I would defer spinal cord stimulator trial and proceed with a right-sided SI joint injection. I will also came for review lumbar flexion and extension radiographs and I will initiate Phyllis on Topamax 25 mg b.i.d. since she responded very well to tricyclic antidepressant therapy; however, she was unable to continue that therapy secondary to side effects. Finally, she has responded very well to L5 transforaminal epidural injections at [facility] in the past; however, she reports having undergone bilateral transforaminal injections. My recommendation regarding this will be to  proceed with interlaminar epidural injection if the symptoms are bilateral and to proceed with transforaminal injection if the symptoms are unilateral.    Plan:     1. Obtain lumbar flexion and extension x-rays  2. Start Topamax 25mg BID  3. Schedule right SI joint injection  4. Consider repeat L5-S1 TFESI vs. interlaminar LESI in the future  5. Recommend deferring SCS trial at this time  6. Start Flexeril 5mg QHS PRN    Again, thank you for allowing me to participate in the care of your patient.      Sincerely,    Felix Aponte MD

## 2017-11-18 ASSESSMENT — ANXIETY QUESTIONNAIRES: GAD7 TOTAL SCORE: 1

## 2017-11-21 ENCOUNTER — TELEPHONE (OUTPATIENT)
Dept: ANESTHESIOLOGY | Facility: CLINIC | Age: 47
End: 2017-11-21
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
## 2017-12-22 ENCOUNTER — TELEPHONE (OUTPATIENT)
Dept: ANESTHESIOLOGY | Facility: CLINIC | Age: 47
End: 2017-12-22

## 2017-12-29 ENCOUNTER — SURGERY (OUTPATIENT)
Age: 47
End: 2017-12-29

## 2017-12-29 ENCOUNTER — HOSPITAL ENCOUNTER (OUTPATIENT)
Facility: AMBULATORY SURGERY CENTER | Age: 47
End: 2017-12-29
Attending: ANESTHESIOLOGY
Payer: COMMERCIAL

## 2017-12-29 ENCOUNTER — RADIANT APPOINTMENT (OUTPATIENT)
Dept: RADIOLOGY | Facility: AMBULATORY SURGERY CENTER | Age: 47
End: 2017-12-29
Attending: ANESTHESIOLOGY
Payer: COMMERCIAL

## 2017-12-29 VITALS
DIASTOLIC BLOOD PRESSURE: 74 MMHG | HEIGHT: 67 IN | SYSTOLIC BLOOD PRESSURE: 126 MMHG | RESPIRATION RATE: 16 BRPM | WEIGHT: 170 LBS | BODY MASS INDEX: 26.68 KG/M2 | OXYGEN SATURATION: 100 % | TEMPERATURE: 98.5 F

## 2017-12-29 DIAGNOSIS — R52 PAIN: ICD-10-CM

## 2017-12-29 RX ORDER — LIDOCAINE HYDROCHLORIDE 10 MG/ML
INJECTION, SOLUTION EPIDURAL; INFILTRATION; INTRACAUDAL; PERINEURAL PRN
Status: DISCONTINUED | OUTPATIENT
Start: 2017-12-29 | End: 2017-12-29 | Stop reason: HOSPADM

## 2017-12-29 RX ORDER — TRIAMCINOLONE ACETONIDE 40 MG/ML
INJECTION, SUSPENSION INTRA-ARTICULAR; INTRAMUSCULAR PRN
Status: DISCONTINUED | OUTPATIENT
Start: 2017-12-29 | End: 2017-12-29 | Stop reason: HOSPADM

## 2017-12-29 RX ORDER — BUPIVACAINE HYDROCHLORIDE 5 MG/ML
INJECTION, SOLUTION EPIDURAL; INTRACAUDAL PRN
Status: DISCONTINUED | OUTPATIENT
Start: 2017-12-29 | End: 2017-12-29 | Stop reason: HOSPADM

## 2017-12-29 RX ADMIN — BUPIVACAINE HYDROCHLORIDE 4 ML: 5 INJECTION, SOLUTION EPIDURAL; INTRACAUDAL at 16:11

## 2017-12-29 RX ADMIN — LIDOCAINE HYDROCHLORIDE 2 ML: 10 INJECTION, SOLUTION EPIDURAL; INFILTRATION; INTRACAUDAL; PERINEURAL at 16:26

## 2017-12-29 RX ADMIN — TRIAMCINOLONE ACETONIDE 40 MG: 40 INJECTION, SUSPENSION INTRA-ARTICULAR; INTRAMUSCULAR at 16:00

## 2017-12-29 NOTE — IP AVS SNAPSHOT
[facility]    [address] [place] [address]    Phone:  [phone]    Fax:  [phone]                                       After Visit Summary   12/29/2017    Phyllis Vela    MRN: [id number]           After Visit Summary Signature Page     I have received my discharge instructions, and my questions have been answered. I have discussed any challenges I see with this plan with the nurse or doctor.    ..........................................................................................................................................  Patient/Patient Representative Signature      ..........................................................................................................................................  Patient Representative Print Name and Relationship to Patient    ..................................................               ................................................  Date                                            Time    ..........................................................................................................................................  Reviewed by Signature/Title    ...................................................              ..............................................  Date                                                            Time

## 2017-12-29 NOTE — DISCHARGE INSTRUCTIONS
"Home Care Instructions after a Sacroiliac Joint Injection    The sacroiliac joints lie next to the spine and connect the sacrum(the bottom of the spine) with the hip on both sides. There are two sacroiliac joints, one on the right and one on the left. Joint inflammation and/or dysfunction in this area can cause pain. In a sacroiliac joint injection, a local anesthetic (numbing medicine) is injected in or near the joint space. Steroids are often used to help with the anti-inflammatory process.     Activity  -You may resume most normal activity levels with the exception of strenuous activity. It is important for us to know if your pain with normal activity is relieved after this injection.  -DO NOT shower for 24 hours  -DO NOT remove bandaid for 24 hours    Pain  -You may experience soreness at the injection site for one or two days  -You may use an ice pack for 20 minutes every 2 hours for the first 24 hours  -You may use a heating pad after the first 24 hours  -You may use Tylenol (acetaminophen) every 4 hours or other pain medicines as     directed by your physician    You may experience numbness radiating into your legs. This numbness may last several hours. Until sensation returns to normal; please use caution in walking, climbing stairs, and stepping out of your vehicle, etc.    DID YOU RECEIVE SEDATION TODAY?  {YES / NO:586738::\"Yes\"}    If you received sedation please follow these additional safety measures.  Sedation medicine, if given, may remain active for many hours. It is important for the next 24 hours that you do not:  -Drive a car  -Operate machines or power tools  -Consume alcohol, including beer  -Sign any important papers or legal documents    DID YOU RECEIVE STEROIDS TODAY?  {YES / NO:866302::\"Yes\"}    Common side effects of steroids:  Not everyone will experience corticosteroid side effects. If side effects are experienced, they will gradually subside in the 7-10 day period following an injection. " Most common side effects include:  -Flushed face and/or chest  -Feeling of warmth, particularly in the face but could be an overall feeling of warmth  -Increased blood sugar in diabetic patients  -Menstrual irregularities my occur. If taking hormone-based birth control an alternate method of birth control is recommended  -Sleep disturbances and/or mood swings are possible  -Leg cramps      PLEASE KEEP TRACK OF YOUR SYMPTOMS AND NOTE YOUR IMPROVEMENT FOR YOUR DOCTOR.     Please contact us if you have:  -Severe pain  -Fever more than 101.5 degrees Fahrenheit  -Signs of infection at the injection site (redness, swelling, or drainage)    If you have questions, please contact our office at [phone] between the hours of 7:00 am and 3:00 pm Monday through Friday. After office hours you can contact the on call provider by dialing [phone]. If you need immediate attention, we recommend that you go to a hospital emergency room or dial [phone].

## 2017-12-29 NOTE — IP AVS SNAPSHOT
MRN:[id number]                      After Visit Summary   12/29/2017    Phyllis Vela    MRN: [id number]           Thank you!     Thank you for choosing [facility] for your care. Our goal is always to provide you with excellent care. Hearing back from our patients is one way we can continue to improve our services. Please take a few minutes to complete the written survey that you may receive in the mail after you visit with us. Thank you!        Patient Information     Date Of Birth          1970        About your hospital stay     You were admitted on:  December 29, 2017 You last received care in the:  [facility]    You were discharged on:  December 29, 2017       Who to Call     For medical emergencies, please call 911.  For non-urgent questions about your medical care, please call your primary care provider or clinic, [phone]  For questions related to your surgery, please call your surgery clinic        Attending Provider     Provider Specialty    Felix Aponte MD Anesthesiology       Primary Care Provider Office Phone # Fax #    [facility] [phone]      Your next 10 appointments already scheduled     Dec 29, 2017   Procedure with Felix Aponte MD   [facility] ([facility])    [address] [place] [address]  5th [facility] [address] [phone]           Located in the Clinics and Surgery Center at [address] [place] [address].   parking is very convenient and highly recommended.  is a $6 flat rate fee.  Both  and self parkers should enter the main arrival plaza from [facility]; parking attendants will direct you based on your parking preference.              Further instructions from your care team       Home Care Instructions after a Sacroiliac Joint Injection    The sacroiliac joints lie next to the spine  "and connect the sacrum(the bottom of the spine) with the hip on both sides. There are two sacroiliac joints, one on the right and one on the left. Joint inflammation and/or dysfunction in this area can cause pain. In a sacroiliac joint injection, a local anesthetic (numbing medicine) is injected in or near the joint space. Steroids are often used to help with the anti-inflammatory process.     Activity  -You may resume most normal activity levels with the exception of strenuous activity. It is important for us to know if your pain with normal activity is relieved after this injection.  -DO NOT shower for 24 hours  -DO NOT remove bandaid for 24 hours    Pain  -You may experience soreness at the injection site for one or two days  -You may use an ice pack for 20 minutes every 2 hours for the first 24 hours  -You may use a heating pad after the first 24 hours  -You may use Tylenol (acetaminophen) every 4 hours or other pain medicines as     directed by your physician    You may experience numbness radiating into your legs. This numbness may last several hours. Until sensation returns to normal; please use caution in walking, climbing stairs, and stepping out of your vehicle, etc.    DID YOU RECEIVE SEDATION TODAY?  {YES / NO:988458::\"Yes\"}    If you received sedation please follow these additional safety measures.  Sedation medicine, if given, may remain active for many hours. It is important for the next 24 hours that you do not:  -Drive a car  -Operate machines or power tools  -Consume alcohol, including beer  -Sign any important papers or legal documents    DID YOU RECEIVE STEROIDS TODAY?  {YES / NO:087520::\"Yes\"}    Common side effects of steroids:  Not everyone will experience corticosteroid side effects. If side effects are experienced, they will gradually subside in the 7-10 day period following an injection. Most common side effects include:  -Flushed face and/or chest  -Feeling of warmth, particularly in the " "face but could be an overall feeling of warmth  -Increased blood sugar in diabetic patients  -Menstrual irregularities my occur. If taking hormone-based birth control an alternate method of birth control is recommended  -Sleep disturbances and/or mood swings are possible  -Leg cramps      PLEASE KEEP TRACK OF YOUR SYMPTOMS AND NOTE YOUR IMPROVEMENT FOR YOUR DOCTOR.     Please contact us if you have:  -Severe pain  -Fever more than 101.5 degrees Fahrenheit  -Signs of infection at the injection site (redness, swelling, or drainage)    If you have questions, please contact our office at [phone] between the hours of 7:00 am and 3:00 pm Monday through Friday. After office hours you can contact the on call provider by dialing [phone]. If you need immediate attention, we recommend that you go to a hospital emergency room or dial 911.      Pending Results     No orders found from 12/27/2017 to 12/30/2017.            Admission Information     Date & Time Provider Department Dept. Phone    12/29/2017 Felix Aponte MD [facility] [phone]      Your Vitals Were     Blood Pressure Temperature Respirations Height Weight Pulse Oximetry    126/87 98.5  F (36.9  C) (Temporal) 16 1.689 m (5' 6.5\") 77.1 kg (170 lb) 100%    BMI (Body Mass Index)                   27.03 kg/m2           [employer] Information     [employer] gives you secure access to your electronic health record. If you see a primary care provider, you can also send messages to your care team and make appointments. If you have questions, please call your primary care clinic.  If you do not have a primary care provider, please call [phone] and they will assist you.      [employer] is an electronic gateway that provides easy, online access to your medical records. With [employer], you can request a clinic appointment, read your test results, renew a prescription or communicate with your care team.     To access your " existing account, please contact your [facility] or call [phone] for assistance.        Care EveryWhere ID     This is your Care EveryWhere ID. This could be used by other organizations to access your [facility] medical records  [id number]        Equal Access to Services     ОЛЕГ TARANGO : Hadii aad ku hadbillieessie Carmen, waaxda luqadaha, qaybta kaalmada adelisanik, janine paizleonayvette pearl. So [facility] [phone].    ATENCIÓN: Si habla español, tiene a watson disposición servicios gratuitos de asistencia lingüística. Llame al [phone].    We comply with applicable federal civil rights laws and [place] laws. We do not discriminate on the basis of race, color, national origin, age, disability, sex, sexual orientation, or gender identity.               Review of your medicines      UNREVIEWED medicines. Ask your doctor about these medicines        Dose / Directions    albuterol 108 (90 BASE) MCG/ACT Inhaler   Commonly known as:  PROAIR HFA/PROVENTIL HFA/VENTOLIN HFA        Dose:  2 puff   Inhale 2 puffs into the lungs as needed   Refills:  0       amphetamine-dextroamphetamine 30 MG per 24 hr capsule   Commonly known as:  ADDERALL XR        Dose:  1 capsule   Take 1 capsule by mouth as needed   Refills:  0       buPROPion 300 MG 24 hr tablet   Commonly known as:  WELLBUTRIN XL        Dose:  300 mg   Take 300 mg by mouth daily   Refills:  0       Calcium-Magnesium-Zinc 333-133-5 MG Tabs per tablet        Dose:  1 tablet   Take 1 tablet by mouth daily   Refills:  0       cholecalciferol 1000 UNIT tablet   Commonly known as:  vitamin D3        Dose:  1000 Units   Take 1,000 Units by mouth daily   Refills:  0       CHROMIUM PO        Dose:  1 tablet   Take 1 tablet by mouth daily   Refills:  0       cyclobenzaprine 5 MG tablet   Commonly known as:  FLEXERIL   Used for:  Post laminectomy syndrome, Neuropathic pain, Myofascial pain syndrome, Physical deconditioning, Tobacco  abuse        Dose:  5 mg   Take 1 tablet (5 mg) by mouth nightly as needed for muscle spasms   Quantity:  30 tablet   Refills:  2       fluticasone 50 MCG/ACT spray   Commonly known as:  FLONASE        Dose:  2 spray   Spray 2 sprays into both nostrils daily   Refills:  0       QC WOMENS DAILY MULTIvitamin Tabs        Dose:  1 tablet   Take 1 tablet by mouth daily   Refills:  0       topiramate 25 MG tablet   Commonly known as:  TOPAMAX   Used for:  Neuropathic pain        Dose:  25 mg   Take 1 tablet (25 mg) by mouth 2 times daily   Quantity:  60 tablet   Refills:  2       traZODone 50 MG tablet   Commonly known as:  DESYREL        Dose:  1-2 tablet   Take 1-2 tablets by mouth daily   Refills:  0                Protect others around you: Learn how to safely use, store and throw away your medicines at www.disposemymeds.org.             Medication List: This is a list of all your medications and when to take them. Check marks below indicate your daily home schedule. Keep this list as a reference.      Medications           Morning Afternoon Evening Bedtime As Needed    albuterol 108 (90 BASE) MCG/ACT Inhaler   Commonly known as:  PROAIR HFA/PROVENTIL HFA/VENTOLIN HFA   Inhale 2 puffs into the lungs as needed                                amphetamine-dextroamphetamine 30 MG per 24 hr capsule   Commonly known as:  ADDERALL XR   Take 1 capsule by mouth as needed                                buPROPion 300 MG 24 hr tablet   Commonly known as:  WELLBUTRIN XL   Take 300 mg by mouth daily                                Calcium-Magnesium-Zinc 333-133-5 MG Tabs per tablet   Take 1 tablet by mouth daily                                cholecalciferol 1000 UNIT tablet   Commonly known as:  vitamin D3   Take 1,000 Units by mouth daily                                CHROMIUM PO   Take 1 tablet by mouth daily                                cyclobenzaprine 5 MG tablet   Commonly known as:  FLEXERIL   Take 1 tablet (5 mg) by mouth  nightly as needed for muscle spasms                                fluticasone 50 MCG/ACT spray   Commonly known as:  FLONASE   Spray 2 sprays into both nostrils daily                                QC WOMENS DAILY MULTIvitamin Tabs   Take 1 tablet by mouth daily                                topiramate 25 MG tablet   Commonly known as:  TOPAMAX   Take 1 tablet (25 mg) by mouth 2 times daily                                traZODone 50 MG tablet   Commonly known as:  DESYREL   Take 1-2 tablets by mouth daily

## 2017-12-31 NOTE — OP NOTE
PROCEDURE: RIGHT SACROILIAC JOINT INJECTION    DIAGNOSIS: sacroiliitis    DESCRIPTION OF PROCEDURE: The patient was brought to the fluoroscopy suite.  IV access was obtained prior to the procedure. The patient was positioned prone on the fluoroscopy table. Continuous hemodynamic monitoring was initiated including blood pressure, EKG, and pulse oximetry. IV sedation was administered incrementally to allow the patient to remain comfortable and conversant throughout the procedure. The lumbosacral area was prepped with Betadine three times and draped into a sterile field. The skin overlying the RIGHT side sacroiliac joint was anesthetized using 3cc of lidocaine 1%. The inferior pole of the sacroiliac joint was identified by cephalo-oblique fluoroscopy. A 22-gauge, 3.5 inch spinal needle was slowly advanced through the sacroiliac joint capsule under fluoroscopic guidance. The needle position was confirmed using oblique, AP and lateral fluoroscopic imaging.  Negative aspiration was confirmed. A combination of 4 cc of bupivacaine 0.5% and 40mg Kenalog was easily injected. The needle was removed and bleeding was nil.  A sterile dressing was applied.         Sedation:      Performed by: Felix Aponte    Indication:  Sedation is required to allow above mentioned procedure     Consent:  Consent obtained from the patient Phyllis Vela after discussing the risks, benefits and alternatives.    PO Intake:  Appropriately NPO for procedure    Lungs: Lungs Clear with good breath sounds bilaterally.     Heart: Normal heart sounds and rate    Focused history and physical completed prior to procedure. I have reviewed the lab findings, diagnostic data, medications, and the plan for sedation. I have determined this patient to be an appropriate candidate for the planned sedation and procedure and have reassessed the patient IMMEDIATELY PRIOR to sedation and procedure.      Sedation Post Procedure Summary:    Prior to the start of the  procedure and with procedural staff participation, I verbally confirmed the patient s identity using two indicators, relevant allergies, that the procedure was appropriate and matched the consent or emergent situation, and that the correct equipment/implants were available. Immediately prior to starting the procedure I conducted the Time Out with the procedural staff and re-confirmed the patient s name, procedure, and site/side. (The Joint Commission universal protocol was followed.)  Yes      Sedatives: Fentanyl and Midazolam (Versed)    Vital signs, airway, End Tidal CO2 and pulse oximetry were monitored and remained stable throughout the procedure and sedation was maintained until the procedure was complete.  The patient was monitored by staff until sedation discharge criteria were met.    Patient tolerance: Patient tolerated the procedure well with no immediate complications.    Time of sedation in minutes:  35 minutes from beginning to end of physician one to one monitoring.

## 2018-01-12 ENCOUNTER — OFFICE VISIT (OUTPATIENT)
Dept: ANESTHESIOLOGY | Facility: CLINIC | Age: 48
End: 2018-01-12
Payer: COMMERCIAL

## 2018-01-12 VITALS
WEIGHT: 165 LBS | BODY MASS INDEX: 25.9 KG/M2 | SYSTOLIC BLOOD PRESSURE: 136 MMHG | RESPIRATION RATE: 16 BRPM | DIASTOLIC BLOOD PRESSURE: 92 MMHG | HEIGHT: 67 IN | HEART RATE: 85 BPM

## 2018-01-12 DIAGNOSIS — M62.830 BACK MUSCLE SPASM: Primary | ICD-10-CM

## 2018-01-12 DIAGNOSIS — Z98.890 S/P LAMINECTOMY: ICD-10-CM

## 2018-01-12 DIAGNOSIS — M54.16 LUMBAR RADICULOPATHY: ICD-10-CM

## 2018-01-12 RX ORDER — CYCLOBENZAPRINE HCL 10 MG
10 TABLET ORAL
Qty: 30 TABLET | Refills: 1 | Status: SHIPPED | OUTPATIENT
Start: 2018-01-12 | End: 2018-02-15

## 2018-01-12 ASSESSMENT — ANXIETY QUESTIONNAIRES
GAD7 TOTAL SCORE: 0
7. FEELING AFRAID AS IF SOMETHING AWFUL MIGHT HAPPEN: NOT AT ALL
2. NOT BEING ABLE TO STOP OR CONTROL WORRYING: NOT AT ALL
GAD7 TOTAL SCORE: 0
5. BEING SO RESTLESS THAT IT IS HARD TO SIT STILL: NOT AT ALL
7. FEELING AFRAID AS IF SOMETHING AWFUL MIGHT HAPPEN: NOT AT ALL
3. WORRYING TOO MUCH ABOUT DIFFERENT THINGS: NOT AT ALL
4. TROUBLE RELAXING: NOT AT ALL
GAD7 TOTAL SCORE: 0
6. BECOMING EASILY ANNOYED OR IRRITABLE: NOT AT ALL
1. FEELING NERVOUS, ANXIOUS, OR ON EDGE: NOT AT ALL

## 2018-01-12 ASSESSMENT — PAIN SCALES - GENERAL: PAINLEVEL: SEVERE PAIN (7)

## 2018-01-12 NOTE — MR AVS SNAPSHOT
After Visit Summary   1/12/2018    Phyllis Vela    MRN: [id number]           Patient Information     Date Of Birth          1970        Visit Information        Provider Department      1/12/2018 12:50 PM Meena Hilario APRN [facility]        Today's Diagnoses     Back muscle spasm    -  1      Care Instructions    1. Flexeril increased to 10 mg at bedtime.     2. After 1-2 weeks you can increase your Topamax to 50 mg in AM, and 50 mg at Bedtime.     Follow up: 4-6 weeks with Dr. Aponte to discuss consideration for an Epidural steroid injection.      To speak with a nurse, schedule/reschedule/cancel a clinic appointment, or request a medication refill call: [phone]     You can also reach us by [email or web address]: [email or web address]    For refills, please call on Monday, 1 week before your medication runs out. The doctors are not always in clinic, so this gives us time to get your prescriptions ready.  Please let us know the name of the medication you are requesting a refill of.                                     Follow-ups after your visit        Who to contact     Please call your clinic at [phone] to:    Ask questions about your health    Make or cancel appointments    Discuss your medicines    Learn about your test results    Speak to your doctor   If you have compliments or concerns about an experience at your clinic, or if you wish to file a complaint, please contact [facility] Patient Relations at [phone] or email us at [email or web address]         Additional Information About Your Visit        [employer] Information     [email or web address] gives you secure access to your electronic health record. If you see a primary care provider, you can also send messages to your care team and make appointments. If you have questions, please call your primary care clinic.  If you do not have a primary care  "provider, please call [phone] and they will assist you.      [employer] is an electronic gateway that provides easy, online access to your medical records. With [employer], you can request a clinic appointment, read your test results, renew a prescription or communicate with your care team.     To access your existing account, please contact your [facility] or call [phone] for assistance.        Care EveryWhere ID     This is your Care EveryWhere ID. This could be used by other organizations to access your [place] medical records  [id number]        Your Vitals Were     Pulse Respirations Height BMI (Body Mass Index)          85 16 1.689 m (5' 6.5\") 26.23 kg/m2         Blood Pressure from Last 3 Encounters:   01/12/18 (!) 136/92   12/29/17 126/74   11/17/17 130/82    Weight from Last 3 Encounters:   01/12/18 74.8 kg (165 lb)   12/29/17 77.1 kg (170 lb)   11/17/17 76.6 kg (168 lb 14.4 oz)              Today, you had the following     No orders found for display         Today's Medication Changes          These changes are accurate as of: 1/12/18  2:04 PM.  If you have any questions, ask your nurse or doctor.               These medicines have changed or have updated prescriptions.        Dose/Directions    cyclobenzaprine 10 MG tablet   Commonly known as:  FLEXERIL   This may have changed:    - medication strength  - how much to take   Used for:  Back muscle spasm   Changed by:  Meena Hilario APRN CNP        Dose:  10 mg   Take 1 tablet (10 mg) by mouth nightly as needed for muscle spasms   Quantity:  30 tablet   Refills:  1       topiramate 25 MG tablet   Commonly known as:  TOPAMAX   This may have changed:  how much to take   Used for:  Neuropathic pain        Dose:  25 mg   Take 1 tablet (25 mg) by mouth 2 times daily   Quantity:  60 tablet   Refills:  2            Where to get your medicines      These medications were sent to [facility] [id number] [place] [address] " - [address]  [address], [facility] [address]     Phone:  [phone]     cyclobenzaprine 10 MG tablet                Primary Care Provider Office Phone # Fax #    [facility] [phone] [address] [place] [address]        Equal Access to Services     ОЛЕГ TARANGO : Hadii aad ku hadasho Soomaali, waaxda luqadaha, qaybta kaalmada adeegyada, waxay idiin hayaan adeeg pingsh la'aan ah. So [facility] [phone].    ATENCIÓN: Si habla esprose, tiene a watson disposición servicios gratuitos de asistencia lingüística. Randall al [phone].    We comply with applicable federal civil rights laws and [place] laws. We do not discriminate on the basis of race, color, national origin, age, disability, sex, sexual orientation, or gender identity.            Thank you!     Thank you for choosing [facility]  for your care. Our goal is always to provide you with excellent care. Hearing back from our patients is one way we can continue to improve our services. Please take a few minutes to complete the written survey that you may receive in the mail after your visit with us. Thank you!             Your Updated Medication List - Protect others around you: Learn how to safely use, store and throw away your medicines at www.disposemymeds.org.          This list is accurate as of: 1/12/18  2:04 PM.  Always use your most recent med list.                   Brand Name Dispense Instructions for use Diagnosis    albuterol 108 (90 BASE) MCG/ACT Inhaler    PROAIR HFA/PROVENTIL HFA/VENTOLIN HFA     Inhale 2 puffs into the lungs as needed        amphetamine-dextroamphetamine 30 MG per 24 hr capsule    ADDERALL XR     Take 1 capsule by mouth as needed        Calcium-Magnesium-Zinc 333-133-5 MG Tabs per tablet      Take 1 tablet by mouth daily        cholecalciferol 1000 UNIT tablet    vitamin D3     Take  1,000 Units by mouth daily        CHROMIUM PO      Take 1 tablet by mouth daily        cyclobenzaprine 10 MG tablet    FLEXERIL    30 tablet    Take 1 tablet (10 mg) by mouth nightly as needed for muscle spasms    Back muscle spasm       fluticasone 50 MCG/ACT spray    FLONASE     Spray 2 sprays into both nostrils daily        QC WOMENS DAILY MULTIvitamin Tabs      Take 1 tablet by mouth daily        topiramate 25 MG tablet    TOPAMAX    60 tablet    Take 1 tablet (25 mg) by mouth 2 times daily    Neuropathic pain       traZODone 50 MG tablet    DESYREL     Take 1-2 tablets by mouth daily

## 2018-01-12 NOTE — PATIENT INSTRUCTIONS
1. Flexeril increased to 10 mg at bedtime.     2. After 1-2 weeks you can increase your Topamax to 50 mg in AM, and 50 mg at Bedtime.     Follow up: 4-6 weeks with Dr. Aponte to discuss consideration for an Epidural steroid injection.      To speak with a nurse, schedule/reschedule/cancel a clinic appointment, or request a medication refill call: [phone]     You can also reach us by [employer]: [email or web address]    For refills, please call on Monday, 1 week before your medication runs out. The doctors are not always in clinic, so this gives us time to get your prescriptions ready.  Please let us know the name of the medication you are requesting a refill of.

## 2018-01-12 NOTE — NURSING NOTE
Pt did not want to wait for AVS but was agreeable to clinic mailing it to them:    Buffy Benedict LPN  [facility]  [phone]

## 2018-01-12 NOTE — PROGRESS NOTES
Date of visit: 1/12/2018    Chief complaint:   Chief Complaint   Patient presents with     Pain Management     follow up after procedure        Interval history:  Phyllis eVla is a 47 year old female, patient of Felix Aponte, last seen in office on 11/17/17 for low back pain, right buttocks pain, and right posterior leg pain with radiation to dorsal surface of right foot. Pain present for >8 years, worsened following a L4-5 right hemilaminectomy in 2015 for disc herniation secondary to degenerative disc disease. She states she had disc reherniation and now her pain is worse than before her surgery. She underwent a right sacroiliac joint injection on 12/29/17. She had informed the clinic that her pain has been aggravated since this procedure. She arrived >20 minutes late for her appointment with Dr. Aponte today; she is following with me due to a schedule opening.     Recommendations/plan at the last visit included:  Phyllis is a 47-year-old female who presents to the pain clinic as a new patient consultation. Her main complaint at today's visit his low back pain and right lower extremity pain. Of note, Phyllis is being referred to the pain clinic by Dr. Felix Floyd in Neurosurgery. She underwent right-sided L4-L5 hemilaminectomy in 2015, which was not helpful for her pain symptomatology. Based upon history, physical exam, review of the medical record, laboratory studies and radiographs, the most likely diagnoses are neuropathic pain, myofascial pain syndrome, deconditioning, sacroiliitis, postlaminectomy syndrome, tobacco abuse and nociceptive pain. At this point, the majority of Phyllis's pain appears to be myofascial and nociceptive in nature with the likely etiology being a right sacroiliac joint. Plus, at this time, I would defer spinal cord stimulator trial and proceed with a right-sided SI joint injection. I will also came for review lumbar flexion and extension radiographs and I will initiate Phyllis on  "Topamax 25 mg b.i.d. since she responded very well to tricyclic antidepressant therapy; however, she was unable to continue that therapy secondary to side effects. Finally, she has responded very well to L5 transforaminal epidural injections at [facility] in the past; however, she reports having undergone bilateral transforaminal injections. My recommendation regarding this will be to proceed with interlaminar epidural injection if the symptoms are bilateral and to proceed with transforaminal injection if the symptoms are unilateral.     Plan:      1. Obtain lumbar flexion and extension x-rays  2. Start Topamax 25mg BID  3. Schedule right SI joint injection  4. Consider repeat L5-S1 TFESI vs. interlaminar LESI in the future  5. Recommend deferring SCS trial at this time  6. Start Flexeril 5mg QHS PRN    Since her last visit, Phyllis Vela reports:  -She reports she has new onset right hip pain that feels \"tight\". Additionally, she complains of radiating symptoms to anterior thigh and upper back since right sacroiliac joint injection. She describes her lower extremity symptoms as \"leg fatigue\"; these symptoms are not new, just exacerbated.   -She endorses that her LE pain was absent with initiation of topiramate, however, she feels her body has become used to this medication and no longer notes relief. She is currently taking 25 mg in the AM and 50 mg at HS; has not increased to 50 mg BID as previously instructed. She is tolerating well; only side effect reported as dry mouth.   -Would like to wait until one month post-injection to allow her body to \"settle down\" and fully realize where she is at pain-wise.   -She requests to have cyclobenzaprine increased; she is only utilizing at night time and feels it is extremely effective for her pain.   -She requests a short-term prescription for tramadol, emphatically stating she was previously on high dose opioid medications and has only had 8 opioid prescriptions filled in the " "past several years.         Medications:  Current Outpatient Prescriptions   Medication Sig Dispense Refill     topiramate (TOPAMAX) 25 MG tablet Take 1 tablet (25 mg) by mouth 2 times daily (Patient taking differently: Take 75 mg by mouth 2 times daily ) 60 tablet 2     cyclobenzaprine (FLEXERIL) 5 MG tablet Take 1 tablet (5 mg) by mouth nightly as needed for muscle spasms 30 tablet 2     buPROPion (WELLBUTRIN XL) 300 MG 24 hr tablet Take 300 mg by mouth daily       traZODone (DESYREL) 50 MG tablet Take 1-2 tablets by mouth daily       amphetamine-dextroamphetamine (ADDERALL XR) 30 MG per 24 hr capsule Take 1 capsule by mouth as needed       CHROMIUM PO Take 1 tablet by mouth daily       albuterol (PROAIR HFA/PROVENTIL HFA/VENTOLIN HFA) 108 (90 BASE) MCG/ACT Inhaler Inhale 2 puffs into the lungs as needed       Multiple Vitamins-Minerals (QC WOMENS DAILY MULTIVITAMIN) TABS Take 1 tablet by mouth daily       cholecalciferol (VITAMIN D3) 1000 UNIT tablet Take 1,000 Units by mouth daily       Calcium-Magnesium-Zinc 333-133-5 MG TABS per tablet Take 1 tablet by mouth daily       fluticasone (FLONASE) 50 MCG/ACT nasal spray Spray 2 sprays into both nostrils daily         Medical History: any changes in medical history since they were last seen? No    Review of Systems:  The 14 system ROS was reviewed from the intake questionnaire; results listed at end of note.     Physical Exam:  Blood pressure (!) 136/92, pulse 85, resp. rate 16, height 1.689 m (5' 6.5\"), weight 74.8 kg (165 lb), not currently breastfeeding.  General: In mild distress due to pain, lying on clinic exam table  Gait: Antalgic  MSK exam: Lumbar Spine:  No spinous process tenderness with palpation of lumbar vertebrae. Right SI joint tenderness without right trochanteric tenderness.  Strength 5/5 bilaterally: dorsiflexion, plantarflexion, hamstrings, quadriceps.  Patellar and Achilles reflexes 2+ bilaterally.   Negative straight leg raise b/l.   Gait is " slow; antalgic, but symmetrical.    Assessment:   Phyllis Vela is a 47 year old female who is seen at the pain clinic for lumbar radiculopathy, muscle spasm of back.    Plan:  1. Interventions: We discussed consideration for a right sided transforaminal lumbar epidural steroid injection, or, conversely, having her return to Dr. Floyd for SCS evaluation. Patient has elected to wait until one month post-SI joint injection to fully realize efficacy. She will follow up with Dr. Felix Aponte in 4-6 weeks to discuss procedures and ongoing medication management. She would like to defer SCS at this time.   2. Medication Management: Cyclobenzaprine increased to 10 mg once at HS prn. Advised patient to await efficacy/side effects of increasing this dose prior to increasing her Topamax to 50 mg BID. Patient verbalizes understanding; will f/u with Dr. Aponte in 4-6 weeks to discuss ongoing medication management.    Advised patient that I did not feel comfortable prescribing opioid medication for her current pain symptomology. We reviewed the importance of maximizing adjuvant therapies, pharmacological and non-pharmacological. She was in agreement with this.   3. Follow up: Return in 4-6 weeks with Dr. Felix Aponte.     Total time spent was 30 minutes, and more than 50% of face to face time was spent in counseling and/or coordination of care regarding plan of care.    AMIRAH Valencia, JARRED-BC  Pain Management  Department of Interventional Pain Management and Anesthesiology   [facility]        Answers for HPI/ROS submitted by the patient on 1/12/2018   JESÚS 7 TOTAL SCORE: 0

## 2018-01-12 NOTE — LETTER
1/12/2018       RE: Phyllis Vela  [address] [facility] [address]     Dear Colleague,    Thank you for referring your patient, Phyllis Vela, to the [facility] FOR COMPREHENSIVE PAIN MANAGEMENT at [facility]. Please see a copy of my visit note below.    Date of visit: 1/12/2018    Chief complaint:   Chief Complaint   Patient presents with     Pain Management     follow up after procedure        Interval history:  Phyllis Vela is a 47 year old female, patient of Felix Aponte, last seen in office on 11/17/17 for low back pain, right buttocks pain, and right posterior leg pain with radiation to dorsal surface of right foot. Pain present for >8 years, worsened following a L4-5 right hemilaminectomy in 2015 for disc herniation secondary to degenerative disc disease. She states she had disc reherniation and now her pain is worse than before her surgery. She underwent a right sacroiliac joint injection on 12/29/17. She had informed the clinic that her pain has been aggravated since this procedure. She arrived >20 minutes late for her appointment with Dr. Aponte today; she is following with me due to a schedule opening.     Recommendations/plan at the last visit included:  Phyllis is a 47-year-old female who presents to the pain clinic as a new patient consultation. Her main complaint at today's visit his low back pain and right lower extremity pain. Of note, Phyllis is being referred to the pain clinic by Dr. Felix Floyd in Neurosurgery. She underwent right-sided L4-L5 hemilaminectomy in 2015, which was not helpful for her pain symptomatology. Based upon history, physical exam, review of the medical record, laboratory studies and radiographs, the most likely diagnoses are neuropathic pain, myofascial pain syndrome, deconditioning, sacroiliitis, postlaminectomy syndrome, tobacco abuse and nociceptive pain. At this point, the majority of Phyllis's pain appears to be  "myofascial and nociceptive in nature with the likely etiology being a right sacroiliac joint. Plus, at this time, I would defer spinal cord stimulator trial and proceed with a right-sided SI joint injection. I will also came for review lumbar flexion and extension radiographs and I will initiate Phyllis on Topamax 25 mg b.i.d. since she responded very well to tricyclic antidepressant therapy; however, she was unable to continue that therapy secondary to side effects. Finally, she has responded very well to L5 transforaminal epidural injections at [facility] in the past; however, she reports having undergone bilateral transforaminal injections. My recommendation regarding this will be to proceed with interlaminar epidural injection if the symptoms are bilateral and to proceed with transforaminal injection if the symptoms are unilateral.     Plan:      1. Obtain lumbar flexion and extension x-rays  2. Start Topamax 25mg BID  3. Schedule right SI joint injection  4. Consider repeat L5-S1 TFESI vs. interlaminar LESI in the future  5. Recommend deferring SCS trial at this time  6. Start Flexeril 5mg QHS PRN    Since her last visit, Phyllis GLOVER Dane reports:  -She reports she has new onset right hip pain that feels \"tight\". Additionally, she complains of radiating symptoms to anterior thigh and upper back since right sacroiliac joint injection. She describes her lower extremity symptoms as \"leg fatigue\"; these symptoms are not new, just exacerbated.   -She endorses that her LE pain was absent with initiation of topiramate, however, she feels her body has become used to this medication and no longer notes relief. She is currently taking 25 mg in the AM and 50 mg at HS; has not increased to 50 mg BID as previously instructed. She is tolerating well; only side effect reported as dry mouth.   -Would like to wait until one month post-injection to allow her body to \"settle down\" and fully realize where she is at pain-wise.   -She requests " "to have cyclobenzaprine increased; she is only utilizing at night time and feels it is extremely effective for her pain.   -She requests a short-term prescription for tramadol, emphatically stating she was previously on high dose opioid medications and has only had 8 opioid prescriptions filled in the past several years.         Medications:  Current Outpatient Prescriptions   Medication Sig Dispense Refill     topiramate (TOPAMAX) 25 MG tablet Take 1 tablet (25 mg) by mouth 2 times daily (Patient taking differently: Take 75 mg by mouth 2 times daily ) 60 tablet 2     cyclobenzaprine (FLEXERIL) 5 MG tablet Take 1 tablet (5 mg) by mouth nightly as needed for muscle spasms 30 tablet 2     buPROPion (WELLBUTRIN XL) 300 MG 24 hr tablet Take 300 mg by mouth daily       traZODone (DESYREL) 50 MG tablet Take 1-2 tablets by mouth daily       amphetamine-dextroamphetamine (ADDERALL XR) 30 MG per 24 hr capsule Take 1 capsule by mouth as needed       CHROMIUM PO Take 1 tablet by mouth daily       albuterol (PROAIR HFA/PROVENTIL HFA/VENTOLIN HFA) 108 (90 BASE) MCG/ACT Inhaler Inhale 2 puffs into the lungs as needed       Multiple Vitamins-Minerals (QC WOMENS DAILY MULTIVITAMIN) TABS Take 1 tablet by mouth daily       cholecalciferol (VITAMIN D3) 1000 UNIT tablet Take 1,000 Units by mouth daily       Calcium-Magnesium-Zinc 333-133-5 MG TABS per tablet Take 1 tablet by mouth daily       fluticasone (FLONASE) 50 MCG/ACT nasal spray Spray 2 sprays into both nostrils daily         Medical History: any changes in medical history since they were last seen? No    Review of Systems:  The 14 system ROS was reviewed from the intake questionnaire; results listed at end of note.     Physical Exam:  Blood pressure (!) 136/92, pulse 85, resp. rate 16, height 1.689 m (5' 6.5\"), weight 74.8 kg (165 lb), not currently breastfeeding.  General: In mild distress due to pain, lying on clinic exam table  Gait: Antalgic  MSK exam: Lumbar Spine:  No " spinous process tenderness with palpation of lumbar vertebrae. Right SI joint tenderness without right trochanteric tenderness.  Strength 5/5 bilaterally: dorsiflexion, plantarflexion, hamstrings, quadriceps.  Patellar and Achilles reflexes 2+ bilaterally.   Negative straight leg raise b/l.   Gait is slow; antalgic, but symmetrical.    Assessment:   Phyllis Vela is a 47 year old female who is seen at the pain clinic for lumbar radiculopathy, muscle spasm of back.    Plan:  1. Interventions: We discussed consideration for a right sided transforaminal lumbar epidural steroid injection, or, conversely, having her return to Dr. Floyd for SCS evaluation. Patient has elected to wait until one month post-SI joint injection to fully realize efficacy. She will follow up with Dr. Felix Aponte in 4-6 weeks to discuss procedures and ongoing medication management. She would like to defer SCS at this time.   2. Medication Management: Cyclobenzaprine increased to 10 mg once at HS prn. Advised patient to await efficacy/side effects of increasing this dose prior to increasing her Topamax to 50 mg BID. Patient verbalizes understanding; will f/u with Dr. Aponte in 4-6 weeks to discuss ongoing medication management.    Advised patient that I did not feel comfortable prescribing opioid medication for her current pain symptomology. We reviewed the importance of maximizing adjuvant therapies, pharmacological and non-pharmacological. She was in agreement with this.   3. Follow up: Return in 4-6 weeks with Dr. Felix Aponte.     Total time spent was 30 minutes, and more than 50% of face to face time was spent in counseling and/or coordination of care regarding plan of care.      Again, thank you for allowing me to participate in the care of your patient.      Sincerely,    AMIRAH Valencia CNP

## 2018-01-13 ASSESSMENT — ANXIETY QUESTIONNAIRES: GAD7 TOTAL SCORE: 0

## 2018-02-15 DIAGNOSIS — M79.2 NEUROPATHIC PAIN: ICD-10-CM

## 2018-02-15 DIAGNOSIS — M62.830 BACK MUSCLE SPASM: ICD-10-CM

## 2018-02-15 RX ORDER — TOPIRAMATE 25 MG/1
TABLET, FILM COATED ORAL
Qty: 90 TABLET | Refills: 0 | Status: SHIPPED | OUTPATIENT
Start: 2018-02-15 | End: 2018-04-26

## 2018-02-15 RX ORDER — CYCLOBENZAPRINE HCL 10 MG
10 TABLET ORAL
Qty: 30 TABLET | Refills: 0 | Status: SHIPPED | OUTPATIENT
Start: 2018-02-15 | End: 2018-04-05

## 2018-02-15 NOTE — TELEPHONE ENCOUNTER
Refill request faxed to clinic for- Flexeril and Topamax.      LPN called pt to confirm how they are taking these medications.     Pt stated that they are taking Topamax 10 mg at bedtime, and Topamax- 25 mg in AM, and 50 mg at Bedtime.     LPN was directed to fill pt's medication for 30 days- as they will be following up with Dr. Aponte in 1 week in clinic.     Buffy Benedict LPN

## 2018-02-22 ENCOUNTER — OFFICE VISIT (OUTPATIENT)
Dept: ANESTHESIOLOGY | Facility: CLINIC | Age: 48
End: 2018-02-22
Payer: COMMERCIAL

## 2018-02-22 VITALS
HEIGHT: 66 IN | SYSTOLIC BLOOD PRESSURE: 122 MMHG | OXYGEN SATURATION: 100 % | WEIGHT: 165 LBS | DIASTOLIC BLOOD PRESSURE: 89 MMHG | HEART RATE: 89 BPM | BODY MASS INDEX: 26.52 KG/M2

## 2018-02-22 DIAGNOSIS — M54.16 LUMBAR RADICULOPATHY: ICD-10-CM

## 2018-02-22 DIAGNOSIS — M79.18 MYOFASCIAL PAIN SYNDROME: ICD-10-CM

## 2018-02-22 DIAGNOSIS — M79.2 NEUROPATHIC PAIN: Primary | ICD-10-CM

## 2018-02-22 DIAGNOSIS — M46.1 SACROILIITIS (H): ICD-10-CM

## 2018-02-22 ASSESSMENT — ANXIETY QUESTIONNAIRES
5. BEING SO RESTLESS THAT IT IS HARD TO SIT STILL: NOT AT ALL
GAD7 TOTAL SCORE: 1
GAD7 TOTAL SCORE: 1
6. BECOMING EASILY ANNOYED OR IRRITABLE: SEVERAL DAYS
7. FEELING AFRAID AS IF SOMETHING AWFUL MIGHT HAPPEN: NOT AT ALL
2. NOT BEING ABLE TO STOP OR CONTROL WORRYING: NOT AT ALL
7. FEELING AFRAID AS IF SOMETHING AWFUL MIGHT HAPPEN: NOT AT ALL
GAD7 TOTAL SCORE: 1
3. WORRYING TOO MUCH ABOUT DIFFERENT THINGS: NOT AT ALL
4. TROUBLE RELAXING: NOT AT ALL
1. FEELING NERVOUS, ANXIOUS, OR ON EDGE: NOT AT ALL

## 2018-02-22 ASSESSMENT — PAIN SCALES - GENERAL: PAINLEVEL: SEVERE PAIN (6)

## 2018-02-22 NOTE — PATIENT INSTRUCTIONS
1. We will follow up with Dr. Floyd regarding a spinal cord stimulator.    Follow up: 4 weeks       To speak with a nurse, schedule/reschedule/cancel a clinic appointment, or request a medication refill call: [phone]     You can also reach us by [employer]: [email or web address]    For refills, please call on Monday, 1 week before your medication runs out. The doctors are not always in clinic, so this gives us time to get your prescriptions ready.  Please let us know the name of the medication you are requesting a refill of.

## 2018-02-22 NOTE — NURSING NOTE
AVS given and reviewed with pt.  Pt verbalized understanding and declined any questions.     Adrianna Florence, RN, BSN

## 2018-02-22 NOTE — LETTER
2018       RE: Phyllis Vela  [address] [facility] [address]     Dear Colleague,    Thank you for referring your patient, Phyllis Vela, to the [facility] CLINIC FOR COMPREHENSIVE PAIN MANAGEMENT at [facility]. Please see a copy of my visit note below.    Subjective:    47 year old female presents for evaluation of low back pain.     She underwent RIGHT SACROILIAC JOINT INJECTION on 17, which was  helpful for her pain symptoms in her buttocks, but not very much for the low back.    She was last seen in clinic on 17:    1. Obtain lumbar flexion and extension x-rays  2. Start Topamax 25mg BID  3. Schedule right SI joint injection  4. Consider repeat L5-S1 TFESI vs. interlaminar LESI in the future  5. Recommend deferring SCS trial at this time  6. Start Flexeril 5mg QHS PRN    She expresses interest today in the SCS trial.    Past Medical History:   Diagnosis Date     ADD (attention deficit disorder) 2013     ADHD (attention deficit hyperactivity disorder)      Chronic back pain 2012     DDD (degenerative disc disease), lumbar 2011     Depressive disorder      Generalized anxiety disorder 2011     Head injury     concussion as a child     Hyperglycemia 2010     Hyperglyceridemia 2010     Insomnia 10/31/2013     Lumbar stenosis 2011    past medical history reviewed with patient.   Past Surgical History:   Procedure Laterality Date     BACK SURGERY Right 2015    Right L4/5 hemilaminectomy, discectomy     BREAST SURGERY Right 06/10/2004    breast biopsy      SECTION        SECTION        SECTION       CHOLECYSTECTOMY       INJECT SACROILIAC JOINT Right 2017    Procedure: INJECT SACROILIAC JOINT;  Right Sacroiliac Joint Injection;  Surgeon: Felix Aponte MD;  Location: [facility] OR    past surgical history reviewed with patient.     Medications:    Current  "Outpatient Prescriptions   Medication     cyclobenzaprine (FLEXERIL) 10 MG tablet     topiramate (TOPAMAX) 25 MG tablet     traZODone (DESYREL) 50 MG tablet     amphetamine-dextroamphetamine (ADDERALL XR) 30 MG per 24 hr capsule     CHROMIUM PO     albuterol (PROAIR HFA/PROVENTIL HFA/VENTOLIN HFA) 108 (90 BASE) MCG/ACT Inhaler     Multiple Vitamins-Minerals (QC WOMENS DAILY MULTIVITAMIN) TABS     cholecalciferol (VITAMIN D3) 1000 UNIT tablet     Calcium-Magnesium-Zinc 333-133-5 MG TABS per tablet     fluticasone (FLONASE) 50 MCG/ACT nasal spray     No current facility-administered medications for this visit.        [place] and [place] Prescription Monitoring Program reviewed    Allergies:     Allergies   Allergen Reactions     Sulfa Drugs Hives     Amitriptyline      Other reaction(s): Other, see comments  Hair loss     Lyrica [Pregabalin]      Other reaction(s): Edema,generalized  Other reaction(s): Edema     Nortriptyline      Other reaction(s): Alopecia  Other reaction(s): Other, see comments  Hairloss, increased blood pressure per patient     Amoxicillin Rash     And itching      Clindamycin Rash     Airway impermeant      Gabapentin Rash       Family History:  family history is not on file.    Review Of Systems  14 point ROS negative except per HPI    Objective:     /89  Pulse 89  Ht 1.676 m (5' 6\")  Wt 74.8 kg (165 lb)  SpO2 100%  BMI 26.63 kg/m2  Body mass index is 26.63 kg/(m^2).    General: In no apparent distress  Mental status: Normal affect, pleasant  Head: Atraumatic, normocephalic  Eyes: Extra-ocular movements grossly intact, no scleral icterus  Cardiovascular: Regular rate  Respiratory: No respiratory distress  Abdomen: soft, non-distended  Msk: Bilateral hips, knees have normal range of motion. Pain with extension and rotation of lumbar spine  Neuro: AAOx3.   CN II-XII are grossly intact.   Back: tenderness to palpation over the lumbar parapsinous musculature, positive Juan's on the right, " negative SLR bilaterally, negative facet loading bilaterally, marked tenderness to palpation over the right SI joint  Skin: No rashes or lesions noted on exposed areas of skin  Lymph: no supraclavicular lymphadenopathy      Chaperone present during the entire Physical Exam: None    Imaging: Reviewed    Assessment:       Phyllis is a 47-year-old female who presents to the pain clinic as a new patient consultation. Her main complaint at today's visit his low back pain and right lower extremity pain. Of note, Phyllis is being referred to the pain clinic by Dr. Felix Floyd in Neurosurgery. She underwent right-sided L4-L5 hemilaminectomy in 2015, which was not helpful for her pain symptomatology. Based upon history, physical exam, review of the medical record, laboratory studies and radiographs, the most likely diagnoses are neuropathic pain, myofascial pain syndrome, deconditioning, sacroiliitis, postlaminectomy syndrome, tobacco abuse and nociceptive pain. She underwent sacroiliac joint injection in December 2017, which initially caused some tightness in her hip; however, she feels that the injection has helped her significantly with her lower back pain and buttocks pain. She also reports that the Topamax has been very helpful for her radicular pain in her legs. At today's visit, she expresses great interest in the spinal cord stimulator and I think that it would be a very reasonable option for her.    Plan:     1. Reach out to Dr. Floyd for possible SCS trial  2. Continue Topamax 25mg BID  3. Continue Flexeril 10mg QHS PRN  4. RTC in 4 weeks      Again, thank you for allowing me to participate in the care of your patient.      Sincerely,    Felix Aponte MD

## 2018-02-22 NOTE — MR AVS SNAPSHOT
After Visit Summary   2/22/2018    Phyllis Vela    MRN: [id number]           Patient Information     Date Of Birth          1970        Visit Information        Provider Department      2/22/2018 7:00 AM Felix Aponte MD [facility] for Comprehensive [facility]        Care Instructions    1. We will follow up with Dr. Floyd regarding a spinal cord stimulator.    Follow up: 4 weeks       To speak with a nurse, schedule/reschedule/cancel a clinic appointment, or request a medication refill call: [phone]     You can also reach us by [employer]: [email or web address]    For refills, please call on Monday, 1 week before your medication runs out. The doctors are not always in clinic, so this gives us time to get your prescriptions ready.  Please let us know the name of the medication you are requesting a refill of.                                     Follow-ups after your visit        Who to contact     Please call your clinic at [phone] to:    Ask questions about your health    Make or cancel appointments    Discuss your medicines    Learn about your test results    Speak to your doctor            Additional Information About Your Visit        SHADOhart Information     [employer] gives you secure access to your electronic health record. If you see a primary care provider, you can also send messages to your care team and make appointments. If you have questions, please call your primary care clinic.  If you do not have a primary care provider, please call [phone] and they will assist you.      [employer] is an electronic gateway that provides easy, online access to your medical records. With [employer], you can request a clinic appointment, read your test results, renew a prescription or communicate with your care team.     To access your existing account, please contact your [facility] or call [phone] for assistance.    "     Care EveryWhere ID     This is your Care EveryWhere ID. This could be used by other organizations to access your [facility] medical records  [id number]        Your Vitals Were     Pulse Height Pulse Oximetry BMI (Body Mass Index)          89 1.676 m (5' 6\") 100% 26.63 kg/m2         Blood Pressure from Last 3 Encounters:   02/22/18 122/89   01/12/18 (!) 136/92   12/29/17 126/74    Weight from Last 3 Encounters:   02/22/18 74.8 kg (165 lb)   01/12/18 74.8 kg (165 lb)   12/29/17 77.1 kg (170 lb)              Today, you had the following     No orders found for display       Primary Care Provider Office Phone # Fax #    [facility] [phone] [address] [place] [address]        Equal Access to Services     [facility] Center[facility]SO : Hadii cece gilmano Socaio, waaxda luqadaha, qaybta kaalmada adeqianyada, janine womack . So [facility] [phone].    ATENCIÓN: Si habla español, tiene a watson disposición servicios gratuitos de asistencia lingüística. Randall al [phone].    We comply with applicable federal civil rights laws and [place] laws. We do not discriminate on the basis of race, color, national origin, age, disability, sex, sexual orientation, or gender identity.            Thank you!     Thank you for choosing [facility] FOR COMPREHENSIVE PAIN MANAGEMENT  for your care. Our goal is always to provide you with excellent care. Hearing back from our patients is one way we can continue to improve our services. Please take a few minutes to complete the written survey that you may receive in the mail after your visit with us. Thank you!             Your Updated Medication List - Protect others around you: Learn how to safely use, store and throw away your medicines at www.disposemymeds.org.          This list is accurate as of 2/22/18  7:27 AM.  Always use your most recent med list.                   Brand Name Dispense Instructions for use " Diagnosis    albuterol 108 (90 BASE) MCG/ACT Inhaler    PROAIR HFA/PROVENTIL HFA/VENTOLIN HFA     Inhale 2 puffs into the lungs as needed        amphetamine-dextroamphetamine 30 MG per 24 hr capsule    ADDERALL XR     Take 1 capsule by mouth as needed        Calcium-Magnesium-Zinc 333-133-5 MG Tabs per tablet      Take 1 tablet by mouth daily        cholecalciferol 1000 UNIT tablet    vitamin D3     Take 1,000 Units by mouth daily        CHROMIUM PO      Take 1 tablet by mouth daily        cyclobenzaprine 10 MG tablet    FLEXERIL    30 tablet    Take 1 tablet (10 mg) by mouth nightly as needed for muscle spasms    Back muscle spasm       fluticasone 50 MCG/ACT spray    FLONASE     Spray 2 sprays into both nostrils daily        QC WOMENS DAILY MULTIvitamin Tabs      Take 1 tablet by mouth daily        topiramate 25 MG tablet    TOPAMAX    90 tablet    Take 25 mg in AM, and 50 mg in the Evening.    Neuropathic pain       traZODone 50 MG tablet    DESYREL     Take 1-2 tablets by mouth daily

## 2018-02-22 NOTE — PROGRESS NOTES
Subjective:    47 year old female presents for evaluation of low back pain.     She underwent RIGHT SACROILIAC JOINT INJECTION on 17, which was  helpful for her pain symptoms in her buttocks, but not very much for the low back.    She was last seen in clinic on 17:    1. Obtain lumbar flexion and extension x-rays  2. Start Topamax 25mg BID  3. Schedule right SI joint injection  4. Consider repeat L5-S1 TFESI vs. interlaminar LESI in the future  5. Recommend deferring SCS trial at this time  6. Start Flexeril 5mg QHS PRN    She expresses interest today in the SCS trial.    Past Medical History:   Diagnosis Date     ADD (attention deficit disorder) 2013     ADHD (attention deficit hyperactivity disorder)      Chronic back pain 2012     DDD (degenerative disc disease), lumbar 2011     Depressive disorder      Generalized anxiety disorder 2011     Head injury     concussion as a child     Hyperglycemia 2010     Hyperglyceridemia 2010     Insomnia 10/31/2013     Lumbar stenosis 2011    past medical history reviewed with patient.   Past Surgical History:   Procedure Laterality Date     BACK SURGERY Right 2015    Right L4/5 hemilaminectomy, discectomy     BREAST SURGERY Right 06/10/2004    breast biopsy      SECTION        SECTION        SECTION       CHOLECYSTECTOMY       INJECT SACROILIAC JOINT Right 2017    Procedure: INJECT SACROILIAC JOINT;  Right Sacroiliac Joint Injection;  Surgeon: Felix Aponte MD;  Location: [facility] OR    past surgical history reviewed with patient.     Medications:    Current Outpatient Prescriptions   Medication     cyclobenzaprine (FLEXERIL) 10 MG tablet     topiramate (TOPAMAX) 25 MG tablet     traZODone (DESYREL) 50 MG tablet     amphetamine-dextroamphetamine (ADDERALL XR) 30 MG per 24 hr capsule     CHROMIUM PO     albuterol (PROAIR HFA/PROVENTIL HFA/VENTOLIN HFA) 108 (90 BASE)  "MCG/ACT Inhaler     Multiple Vitamins-Minerals (QC WOMENS DAILY MULTIVITAMIN) TABS     cholecalciferol (VITAMIN D3) 1000 UNIT tablet     Calcium-Magnesium-Zinc 333-133-5 MG TABS per tablet     fluticasone (FLONASE) 50 MCG/ACT nasal spray     No current facility-administered medications for this visit.        [place] and [place] Prescription Monitoring Program reviewed    Allergies:     Allergies   Allergen Reactions     Sulfa Drugs Hives     Amitriptyline      Other reaction(s): Other, see comments  Hair loss     Lyrica [Pregabalin]      Other reaction(s): Edema,generalized  Other reaction(s): Edema     Nortriptyline      Other reaction(s): Alopecia  Other reaction(s): Other, see comments  Hairloss, increased blood pressure per patient     Amoxicillin Rash     And itching      Clindamycin Rash     Airway impermeant      Gabapentin Rash       Family History:  family history is not on file.    Review Of Systems  14 point ROS negative except per HPI    Objective:     /89  Pulse 89  Ht 1.676 m (5' 6\")  Wt 74.8 kg (165 lb)  SpO2 100%  BMI 26.63 kg/m2  Body mass index is 26.63 kg/(m^2).    General: In no apparent distress  Mental status: Normal affect, pleasant  Head: Atraumatic, normocephalic  Eyes: Extra-ocular movements grossly intact, no scleral icterus  Cardiovascular: Regular rate  Respiratory: No respiratory distress  Abdomen: soft, non-distended  Msk: Bilateral hips, knees have normal range of motion. Pain with extension and rotation of lumbar spine  Neuro: AAOx3.   CN II-XII are grossly intact.   Back: tenderness to palpation over the lumbar parapsinous musculature, positive Juan's on the right, negative SLR bilaterally, negative facet loading bilaterally, marked tenderness to palpation over the right SI joint  Skin: No rashes or lesions noted on exposed areas of skin  Lymph: no supraclavicular lymphadenopathy      Chaperone present during the entire Physical Exam: None    Imaging: " Reviewed    Assessment:       Phyllis is a 47-year-old female who presents to the pain clinic as a new patient consultation. Her main complaint at today's visit his low back pain and right lower extremity pain. Of note, Phyllis is being referred to the pain clinic by Dr. Felix Floyd in Neurosurgery. She underwent right-sided L4-L5 hemilaminectomy in 2015, which was not helpful for her pain symptomatology. Based upon history, physical exam, review of the medical record, laboratory studies and radiographs, the most likely diagnoses are neuropathic pain, myofascial pain syndrome, deconditioning, sacroiliitis, postlaminectomy syndrome, tobacco abuse and nociceptive pain. She underwent sacroiliac joint injection in December 2017, which initially caused some tightness in her hip; however, she feels that the injection has helped her significantly with her lower back pain and buttocks pain. She also reports that the Topamax has been very helpful for her radicular pain in her legs. At today's visit, she expresses great interest in the spinal cord stimulator and I think that it would be a very reasonable option for her.    Plan:     1. Reach out to Dr. Floyd for possible SCS trial  2. Continue Topamax 25mg BID  3. Continue Flexeril 10mg QHS PRN  4. RTC in 4 weeks

## 2018-02-23 ASSESSMENT — ANXIETY QUESTIONNAIRES: GAD7 TOTAL SCORE: 1

## 2018-03-08 ENCOUNTER — TELEPHONE (OUTPATIENT)
Dept: ANESTHESIOLOGY | Facility: CLINIC | Age: 48
End: 2018-03-08

## 2018-03-08 NOTE — TELEPHONE ENCOUNTER
----- Message from Juliann Ricci sent at 3/7/2018  1:47 PM CST -----  Regarding: Patient call  Contact: [phone]  Phyllis called today to schedule with Dr. Aponte

## 2018-03-13 ENCOUNTER — TELEPHONE (OUTPATIENT)
Dept: ANESTHESIOLOGY | Facility: CLINIC | Age: 48
End: 2018-03-13

## 2018-03-13 NOTE — TELEPHONE ENCOUNTER
----- Message from Adrianna Florence RN sent at 3/13/2018  1:06 PM CDT -----  Regarding: RE: SCS   Please confirm date and time once scheduled.   ----- Message -----     From: Jay Jay Gomez RN     Sent: 3/8/2018  10:05 AM       To: Adrianna Florence RN  Subject: FW: SCS                                              ----- Message -----     From: Felix Aponte MD     Sent: 3/6/2018  12:38 PM       To: Adrianna Florence RN, Jay Jay Gomez RN, #  Subject: RE: SCS                                          Dear Dr. Floyd,    Thanks for the message.    I can go ahead and take care of the SCS. Thank you again for the referral.    Ronal, let's bring Mrs. Vela in for her pre-SCS trial appointment and proceed with obtaining PA/insurance approvals.    Thank you.    Dr. Aponte    ----- Message -----     From: Felix Floyd MD     Sent: 3/6/2018   6:55 AM       To: Felix Aponte MD  Subject: RE: SCS                                          Either is find.  If you would like to proceed with the SCS, then go ahead.  ----- Message -----     From: Felix Aponte MD     Sent: 2/22/2018   7:27 AM       To: Felix Floyd MD  Subject: SCS                                              Dear Felix,    I hope you're doing well.    I just saw Phyllis in clinic.    She is doing better now after the SI injection and being started on Topamax.    She is now very interested in the SCS trial.    Do you want to do the SCS, or would you like me to do it?    Please let me know.    Thank you again for the referral.    Addi

## 2018-03-13 NOTE — TELEPHONE ENCOUNTER
Phyllis called today to schedule her SCS trial. She stated that she received a phone call letting her know that she needed a clinic appointment before she could schedule a trial.    Please advise on what she should do.

## 2018-03-14 ENCOUNTER — TELEPHONE (OUTPATIENT)
Dept: ANESTHESIOLOGY | Facility: CLINIC | Age: 48
End: 2018-03-14

## 2018-03-14 NOTE — TELEPHONE ENCOUNTER
Cleo Dewitt 
 
 
 [address] [facility] [address] [facility] 
[phone] Patient: Yodit Erickson MRN: [id number] :1965 You are allergic to the following No active allergies Recent Documentation Smoking Status Current Every Day Smoker Emergency Contacts Name Discharge Info Relation Home Work Mobile Wm. Chen Jernigan  Son [02] [phone] About your hospitalization You were admitted on:  2017 You last received care in the:  [facility] You were discharged on:  2017 Unit phone number:  [phone] Why you were hospitalized Your primary diagnosis was:  Not on File Providers Seen During Your Hospitalizations Provider Role Specialty Primary office phone Vera Jarquin MD Attending Provider Gastroenterology [phone] Your Primary Care Physician (PCP) Primary Care Physician Office Phone Office Fax Basim Wright [phone] Follow-up Information None Current Discharge Medication List  
  
ASK your doctor about these medications Dose & Instructions Dispensing Information Comments Morning Noon Evening Bedtime  
 furosemide 40 mg tablet Commonly known as:  LASIX Your last dose was: Your next dose is:    
   
   
 Dose:  40 mg Take 40 mg by mouth daily. Refills:  0  
     
   
   
   
  
 traMADol 50 mg tablet Commonly known as:  ULTRAM  
   
Your last dose was: Your next dose is:    
   
   
 Dose:  50 mg Take 1 Tab by mouth every six (6) hours as needed for Pain for up to 15 doses. Max Daily Amount: 200 mg. Quantity:  15 Tab Refills:  0 Discharge Instructions Rickey [address] [phone] [address] Department of Interventional Radiology Phyllis called today to schedule her procedure. I have her tentatively scheduled for 4/27/2018. I let her know that Adrianna will be working with her insurance to approve.     Adrianna will be calling her with further instructions. I will cancel her clinic appointment for 3/19, as that is not needed.   [facility] 
[phone] Office 
[phone] Fax [facility] DISCHARGE INSTRUCTIONS General Information: 
During this procedure, the doctor will insert a needle into the abdomen to drain fluid. After the procedure, you will be able to take a deep breath much easier. The site of the puncture may ooze the first day. This will decrease and eventually stop. Paracentesis (draining fluid from the abdomen) sometimes makes patients hypotensive (low blood pressure). Your doctor may order for you to receive fluids or albumin (a volume booster) during the procedure through an IV site. Home Care Instructions: 
Keep the puncture site clean and dry. No tub baths or swimming until puncture site heals. Showering is acceptable. Resume your normal diet, and resume your normal activity slowly and as you tolerate. If you are short of breath, rest. If shortness of breath does not ease, please call your ordering doctor. Fluid can re-accumulate in the chest and/or in the abdomen. If this should occur, your doctor needs to know as you may need to have the procedure done again. Call If: 
   You should call your Physician and/or the Radiology Nurse if you notice any signs of infection, like pus draining, or if it is swollen or reddened. Also call if you have a fever, or if you are bleeding from the puncture site more than a small amount on the dressing. Call if the puncture site keeps draining fluid. Some oozing is to be expected, but should slow and then stop. Call if you feel like you have pressure in your abdomen. SEEK IMMEDIATE CARE OR CALL 911 IF YOU SUDDENLY HAVE TROUBLE BREATHING, OR IF YOUR LIPS TURN BLUE, OR IF YOU NOTICE BLOOD IN YOUR SPUTUM. Follow-Up Instructions: Please see your ordering doctor as he/she has requested. To Reach Us: If you have any questions about your procedure, please call the Interventional Radiology department at [phone].  After [employer] hours (5pm) and weekends, call the answering service at [phone] and ask for the Radiologist on call to be paged. Interventional Radiology General Nurse Discharge After general anesthesia or intravenous sedation, for 24 hours or while taking prescription Narcotics: · Limit your activities · Do not drive and operate hazardous machinery · Do not make important personal or business decisions · Do  not drink alcoholic beverages · If you have not urinated within 8 hours after discharge, please contact your surgeon on call. * Please give a list of your current medications to your Primary Care Provider. * Please update this list whenever your medications are discontinued, doses are 
   changed, or new medications (including over-the-counter products) are added. * Please carry medication information at all times in case of emergency situations. These are general instructions for a healthy lifestyle: No smoking/ No tobacco products/ Avoid exposure to second hand smoke Surgeon General's Warning:  Quitting smoking now greatly reduces serious risk to your health. Obesity, smoking, and sedentary lifestyle greatly increases your risk for illness A healthy diet, regular physical exercise & weight monitoring are important for maintaining a healthy lifestyle You may be retaining fluid if you have a history of heart failure or if you experience any of the following symptoms:  Weight gain of 3 pounds or more overnight or 5 pounds in a week, increased swelling in our hands or feet or shortness of breath while lying flat in bed. Please call your doctor as soon as you notice any of these symptoms; do not wait until your next office visit. Recognize signs and symptoms of STROKE: 
F-face looks uneven A-arms unable to move or move unevenly S-speech slurred or non-existent T-time-call 911 as soon as signs and symptoms begin-DO NOT go  
 Back to bed or wait to see if you get better-TIME IS BRAIN. Date: 8/17/2017 Discharging Nurse: Tiffanie Steward Discharge Orders None Introducing [facility]! Funmilay[employer] Alcala introduces [email or web address] patient portal. Now you can access parts of your medical record, email your doctor's office, and request medication refills online. 1. In your internet browser, go to [email or web address] 2. Click on the First Time User? Click Here link in the Sign In box. You will see the New Member Sign Up page. 3. Enter your [email or web address] Access Code exactly as it appears below. You will not need to use this code after youve completed the sign-up process. If you do not sign up before the expiration date, you must request a new code. · [email or web address] Access Code: [email or web address] Expires: 9/16/2017  3:49 PM 
 
4. Enter the last four digits of your Social Security Number (xxxx) and Date of Birth (mm/dd/yyyy) as indicated and click Submit. You will be taken to the next sign-up page. 5. Create a [email or web address] ID. This will be your [email or web address] login ID and cannot be changed, so think of one that is secure and easy to remember. 6. Create a [email or web address] password. You can change your password at any time. 7. Enter your Password Reset Question and Answer. This can be used at a later time if you forget your password. 8. Enter your e-mail address. You will receive e-mail notification when new information is available in [address] 9. Click Sign Up. You can now view and download portions of your medical record. 10. Click the Download Summary menu link to download a portable copy of your medical information. If you have questions, please visit the Frequently Asked Questions section of the [email or web address] website. Remember, [email or web address] is NOT to be used for urgent needs. For medical emergencies, dial 911. Now available from your iPhone and Android! General Information Please provide this summary of care documentation to your next provider. Patient Signature:  ____________________________________________________________ Date:  ____________________________________________________________  
  
Abdelrahman Endo Provider Signature:  ____________________________________________________________ Date:  ____________________________________________________________

## 2018-03-19 ENCOUNTER — OFFICE VISIT (OUTPATIENT)
Dept: ANESTHESIOLOGY | Facility: CLINIC | Age: 48
End: 2018-03-19
Payer: COMMERCIAL

## 2018-03-19 VITALS
HEIGHT: 66 IN | BODY MASS INDEX: 27.32 KG/M2 | HEART RATE: 88 BPM | SYSTOLIC BLOOD PRESSURE: 125 MMHG | WEIGHT: 170 LBS | RESPIRATION RATE: 16 BRPM | DIASTOLIC BLOOD PRESSURE: 88 MMHG

## 2018-03-19 DIAGNOSIS — M79.2 NEUROPATHIC PAIN: Primary | ICD-10-CM

## 2018-03-19 DIAGNOSIS — M54.16 LUMBAR RADICULOPATHY: ICD-10-CM

## 2018-03-19 ASSESSMENT — ANXIETY QUESTIONNAIRES
7. FEELING AFRAID AS IF SOMETHING AWFUL MIGHT HAPPEN: NOT AT ALL
5. BEING SO RESTLESS THAT IT IS HARD TO SIT STILL: NOT AT ALL
4. TROUBLE RELAXING: NOT AT ALL
GAD7 TOTAL SCORE: 1
3. WORRYING TOO MUCH ABOUT DIFFERENT THINGS: NOT AT ALL
1. FEELING NERVOUS, ANXIOUS, OR ON EDGE: NOT AT ALL
7. FEELING AFRAID AS IF SOMETHING AWFUL MIGHT HAPPEN: NOT AT ALL
GAD7 TOTAL SCORE: 1
GAD7 TOTAL SCORE: 1
2. NOT BEING ABLE TO STOP OR CONTROL WORRYING: NOT AT ALL
6. BECOMING EASILY ANNOYED OR IRRITABLE: SEVERAL DAYS

## 2018-03-19 ASSESSMENT — PAIN SCALES - GENERAL: PAINLEVEL: SEVERE PAIN (7)

## 2018-03-19 NOTE — PATIENT INSTRUCTIONS
Psychological clearance for spinal cord stimulator trial  o Call [phone] to make an appointment with Dr. Chinyere Michel  o If you have this done elsewhere, please have the records faxed to us  Attention: Pain Clinic   Fax: [phone]    Make an appointment in the PAC Clinic for a history and physical or have your PCP do this  o Dr. Aponte prefers PAC for trial and implant  o Dr. Ochoa will do bedside history and physical for trial    Have your labs checked 2 - 4 weeks before the scheduled procedure  o If your labs are done elsewhere, please have the records faxed to  Attention: Pain Clinic   Fax: [phone]      Skin prep before your procedure  o SCS for your back: scrub your mid back to buttock area, daily for 5 days and the morning of your procedure  o SCS for your neck: scrub the back of your head to buttock area daily for 5 days and the morning of your procedure      Wear a pair of pants with a belt the day of your procedure    Patient Pre-Procedure Instructions    CAUTION - FAILURE TO FOLLOW THESE PRE-PROCEDURE INSTRUCTIONS WILL RESULT IN YOUR PROCEDURE BEING RESCHEDULED.      Pregnancy  If you are pregnant, or think you may be pregnant, please notify our staff.     If you are not feeling well before your procedure If you experience a fever of higher than 100  F, chills, rash, or open wounds during the one week before your procedure, please call clinic to see if you may proceed with the procedure. It may be best that you are feeling better before proceeding.     Antibiotics If you are currently taking antibiotics or will be prescribed antibiotics, you must complete the entire course 7 days prior to your scheduled procedure. You must not have any signs or symptoms of infection after completing the antibiotics in order to proceed.       We recommend a responsible  accompany you and drive you home safely. If a responsible  is not available, you may arrange transportation and have a  responsible adult accompany you home.     If you will be receiving sedation or anticipate receiving sedation during your procedure, you must have a responsible adult accompany you to your appointment, take you home safely, and stay with you after your procedure. If a responsible adult is not available, you will not receive sedation.     Fasting Protocol Do not eat any solid foods 8 hours before your procedure.    You may have clear liquids up to 2 hours before your procedure.     Examples of clear liquids: water, fruit juice without pulp, plain gelatin without fruit or topping, tea, coffee without milk or creamer    If you take any morning medications that do not need to be held, take your medications at least 2 hours before your procedure with a small sip of water.     Medications Medications that thin your blood and may cause bleeding during your procedure will need to be held. We will need the doctor who is prescribing your anticoagulant to sign a release permitting you to stop the medication. Once approved by your prescribing doctor - STOP ALL ANTICOAGULANTS ACCORDING TO INSTRUCTIONS. Medications may be restarted after your procedure.     Labs may need to be checked before your procedure if you take certain anticoagulants.    The information below contains general guidelines.      Blood Thinners   14 DAY HOLD  Ticlid (ticlopidine)    10 DAY HOLD  Effient (Prasugel)    3 DAY HOLD  Xarelto (rivaroxaban) 7 DAY HOLD  Anacin, Bufferin, Ecotrin, Excedrin, Aggrenox (Aspirin)  Brilinta (ticagrelor)  Coumadin (Warfarin)  Pradexa (Dabigatran)  Elmiron (Pentosan)  Plavix (Clopidogrel Bisulfate)  Pletal (Cilostazol)    24 HOUR HOLD  Lovenox (enoxaparin)  Agrylin (Anagrelide)        Non-steroidal Anti-inflammatories (NSAIDs) These medications MAY or MAY NOT need to be held depending on your procedure. If you take any of these medications on this list, please discuss it with your doctor to see if the medications will need to  be held.      Celebrex does not need to be held prior to your procedure    3 DAY HOLD  Advil, Motrin (Ibuprofen)  Arthrotec (diciofenac sodium/misoprostol)  Clinoril (Sulindac)  Indocin (Indomethacin)  Lodine (Etodolac)  Toradol (Ketorolac)  Vicoprofen (Hydrocodone and Ibuprofen)  Voltaren (Diclotenac)    14 DAY HOLD  Daypro (Oxaprozin)  Feldene (Piroxicam)   7 DAY HOLD  Aleve (Naproxen sodium)  Darvon compound (contains aspirin)  Naprosyn (Naproxen)  Norgesic Forte (contains aspirin)  Mobic (Meloxicam)  Oruvall (Ketoprofen)  Percodan (contains aspirin)  Relafen (Nabumetone)  Salsalate  Trilisate  Vitamin E (more than 400 mg per day)  Any medication containing aspirin                Post procedure    Representative will call you frequently to follow up during the trial    Make follow up clinic visit between 7-9 days for lead pull    Clinic phone number is [phone].

## 2018-03-19 NOTE — MR AVS SNAPSHOT
After Visit Summary   3/19/2018    Phyllis Vela    MRN: [id number]           Patient Information     Date Of Birth          1970        Visit Information        Provider Department      3/19/2018 11:00 AM Felix Aponte MD [facility]        Today's Diagnoses     Neuropathic pain    -  1    Lumbar radiculopathy          Care Instructions      Psychological clearance for spinal cord stimulator trial  o Call [phone] to make an appointment with Dr. Chinyere Michel  o If you have this done elsewhere, please have the records faxed to us  Attention: Pain Clinic   Fax: [phone]    Make an appointment in the PAC Clinic for a history and physical or have your PCP do this  o Dr. Aponte prefers PAC for trial and implant  o Dr. Ochoa will do bedside history and physical for trial    Have your labs checked 2 - 4 weeks before the scheduled procedure  o If your labs are done elsewhere, please have the records faxed to  Attention: Pain Clinic   Fax: [phone]      Skin prep before your procedure  o SCS for your back: scrub your mid back to buttock area, daily for 5 days and the morning of your procedure  o SCS for your neck: scrub the back of your head to buttock area daily for 5 days and the morning of your procedure      Wear a pair of pants with a belt the day of your procedure    Patient Pre-Procedure Instructions    CAUTION - FAILURE TO FOLLOW THESE PRE-PROCEDURE INSTRUCTIONS WILL RESULT IN YOUR PROCEDURE BEING RESCHEDULED.      Pregnancy  If you are pregnant, or think you may be pregnant, please notify our staff.     If you are not feeling well before your procedure If you experience a fever of higher than 100  F, chills, rash, or open wounds during the one week before your procedure, please call clinic to see if you may proceed with the procedure. It may be best that you are feeling better before proceeding.     Antibiotics If you are  currently taking antibiotics or will be prescribed antibiotics, you must complete the entire course 7 days prior to your scheduled procedure. You must not have any signs or symptoms of infection after completing the antibiotics in order to proceed.       We recommend a responsible  accompany you and drive you home safely. If a responsible  is not available, you may arrange transportation and have a responsible adult accompany you home.     If you will be receiving sedation or anticipate receiving sedation during your procedure, you must have a responsible adult accompany you to your appointment, take you home safely, and stay with you after your procedure. If a responsible adult is not available, you will not receive sedation.     Fasting Protocol Do not eat any solid foods 8 hours before your procedure.    You may have clear liquids up to 2 hours before your procedure.     Examples of clear liquids: water, fruit juice without pulp, plain gelatin without fruit or topping, tea, coffee without milk or creamer    If you take any morning medications that do not need to be held, take your medications at least 2 hours before your procedure with a small sip of water.     Medications Medications that thin your blood and may cause bleeding during your procedure will need to be held. We will need the doctor who is prescribing your anticoagulant to sign a release permitting you to stop the medication. Once approved by your prescribing doctor - STOP ALL ANTICOAGULANTS ACCORDING TO INSTRUCTIONS. Medications may be restarted after your procedure.     Labs may need to be checked before your procedure if you take certain anticoagulants.    The information below contains general guidelines.      Blood Thinners   14 DAY HOLD  Ticlid (ticlopidine)    10 DAY HOLD  Effient (Prasugel)    3 DAY HOLD  Xarelto (rivaroxaban) 7 DAY HOLD  Anacin, Bufferin, Ecotrin, Excedrin, Aggrenox (Aspirin)  Brilinta (ticagrelor)  Coumadin  (Warfarin)  Pradexa (Dabigatran)  Elmiron (Pentosan)  Plavix (Clopidogrel Bisulfate)  Pletal (Cilostazol)    24 HOUR HOLD  Lovenox (enoxaparin)  Agrylin (Anagrelide)        Non-steroidal Anti-inflammatories (NSAIDs) These medications MAY or MAY NOT need to be held depending on your procedure. If you take any of these medications on this list, please discuss it with your doctor to see if the medications will need to be held.      Celebrex does not need to be held prior to your procedure    3 DAY HOLD  Advil, Motrin (Ibuprofen)  Arthrotec (diciofenac sodium/misoprostol)  Clinoril (Sulindac)  Indocin (Indomethacin)  Lodine (Etodolac)  Toradol (Ketorolac)  Vicoprofen (Hydrocodone and Ibuprofen)  Voltaren (Diclotenac)    14 DAY HOLD  Daypro (Oxaprozin)  Feldene (Piroxicam)   7 DAY HOLD  Aleve (Naproxen sodium)  Darvon compound (contains aspirin)  Naprosyn (Naproxen)  Norgesic Forte (contains aspirin)  Mobic (Meloxicam)  Oruvall (Ketoprofen)  Percodan (contains aspirin)  Relafen (Nabumetone)  Salsalate  Trilisate  Vitamin E (more than 400 mg per day)  Any medication containing aspirin                Post procedure    Representative will call you frequently to follow up during the trial    Make follow up clinic visit between 7-9 days for lead pull    Clinic phone number is [phone].               Follow-ups after your visit        Your next 10 appointments already scheduled     Apr 27, 2018   Procedure with Felix Aponte MD   [facility] ([facility])    [address] [place] [address]  5th [facility] [address] [phone]           Located in the Clinics and Surgery Center at [address] [place] [address].   parking is very convenient and highly recommended.  is a $6 flat rate fee.  Both  and self parkers should enter the main arrival plaza from [facility]; parking attendants will direct you based on your parking  "preference.              Future tests that were ordered for you today     Open Future Orders        Priority Expected Expires Ordered    Methicillin resistant staph aureus cult Routine  3/20/2019 3/19/2018    UA with Microscopic Routine  3/20/2019 3/19/2018            Who to contact     Please call your clinic at [phone] to:    Ask questions about your health    Make or cancel appointments    Discuss your medicines    Learn about your test results    Speak to your doctor            Additional Information About Your Visit        [employer] Information     [email or web address] gives you secure access to your electronic health record. If you see a primary care provider, you can also send messages to your care team and make appointments. If you have questions, please call your primary care clinic.  If you do not have a primary care provider, please call [phone] and they will assist you.      [email or web address] is an electronic gateway that provides easy, online access to your medical records. With [email or web address], you can request a clinic appointment, read your test results, renew a prescription or communicate with your care team.     To access your existing account, please contact your [facility] or call [phone] for assistance.        Care EveryWhere ID     This is your Care EveryWhere ID. This could be used by other organizations to access your [facility] medical records  [id number]        Your Vitals Were     Pulse Respirations Height BMI (Body Mass Index)          88 16 1.676 m (5' 6\") 27.44 kg/m2         Blood Pressure from Last 3 Encounters:   03/19/18 125/88   02/22/18 122/89   01/12/18 (!) 136/92    Weight from Last 3 Encounters:   03/19/18 77.1 kg (170 lb)   02/22/18 74.8 kg (165 lb)   01/12/18 74.8 kg (165 lb)              We Performed the Following     Basic metabolic panel     CBC with platelets differential     INR     Mireya-Operative Worksheet - Spinal Cord Stimulator Trial        Primary Care " Provider Office Phone # Fax #    [facility] [phone] [address] [place] [address]        Equal Access to Services     ОЛЕГ TARANGO : Hadii aad ku hadbillieessie Georgianacaio, waquinda shahbazmonik, qasteventa kasha hays, janine fuentescecile mcculloughqian leon vu pearl. So [facility] [phone].    ATENCIÓN: Si habla español, tiene a watson disposición servicios gratuitos de asistencia lingüística. Llame al [phone].    We comply with applicable federal civil rights laws and [place] laws. We do not discriminate on the basis of race, color, national origin, age, disability, sex, sexual orientation, or gender identity.            Thank you!     Thank you for choosing [facility]  for your care. Our goal is always to provide you with excellent care. Hearing back from our patients is one way we can continue to improve our services. Please take a few minutes to complete the written survey that you may receive in the mail after your visit with us. Thank you!             Your Updated Medication List - Protect others around you: Learn how to safely use, store and throw away your medicines at www.disposemymeds.org.          This list is accurate as of 3/19/18 12:12 PM.  Always use your most recent med list.                   Brand Name Dispense Instructions for use Diagnosis    albuterol 108 (90 BASE) MCG/ACT Inhaler    PROAIR HFA/PROVENTIL HFA/VENTOLIN HFA     Inhale 2 puffs into the lungs as needed        amphetamine-dextroamphetamine 30 MG per 24 hr capsule    ADDERALL XR     Take 1 capsule by mouth as needed        Calcium-Magnesium-Zinc 333-133-5 MG Tabs per tablet      Take 1 tablet by mouth daily        cholecalciferol 1000 UNIT tablet    vitamin D3     Take 1,000 Units by mouth daily        CHROMIUM PO      Take 1 tablet by mouth daily        cyclobenzaprine 10 MG tablet    FLEXERIL    30 tablet    Take 1 tablet (10 mg) by mouth nightly as  needed for muscle spasms    Back muscle spasm       fluticasone 50 MCG/ACT spray    FLONASE     Spray 2 sprays into both nostrils daily        QC WOMENS DAILY MULTIvitamin Tabs      Take 1 tablet by mouth daily        topiramate 25 MG tablet    TOPAMAX    90 tablet    Take 25 mg in AM, and 50 mg in the Evening.    Neuropathic pain       traZODone 50 MG tablet    DESYREL     Take 1-2 tablets by mouth daily

## 2018-03-19 NOTE — PROGRESS NOTES
Subjective:    47 year old female presents for evaluation of low back pain.     She was last seen in clinic on 18:    1. Reach out to Dr. Floyd for possible SCS trial  2. Continue Topamax 25mg BID  3. Continue Flexeril 10mg QHS PRN  4. RTC in 4 weeks    Since the previous visit, Phyllis has decided to proceed with SCS trial.    She underwent right SI injection on 17.      Past Medical History:   Diagnosis Date     ADD (attention deficit disorder) 2013     ADHD (attention deficit hyperactivity disorder)      Chronic back pain 2012     DDD (degenerative disc disease), lumbar 2011     Depressive disorder      Generalized anxiety disorder 2011     Head injury     concussion as a child     Hyperglycemia 2010     Hyperglyceridemia 2010     Insomnia 10/31/2013     Lumbar stenosis 2011    past medical history reviewed with patient.   Past Surgical History:   Procedure Laterality Date     BACK SURGERY Right 2015    Right L4/5 hemilaminectomy, discectomy     BREAST SURGERY Right 06/10/2004    breast biopsy      SECTION        SECTION        SECTION       CHOLECYSTECTOMY       INJECT SACROILIAC JOINT Right 2017    Procedure: INJECT SACROILIAC JOINT;  Right Sacroiliac Joint Injection;  Surgeon: Felix Aponte MD;  Location: [facility] OR    past surgical history reviewed with patient.     Medications:    Current Outpatient Prescriptions   Medication     cyclobenzaprine (FLEXERIL) 10 MG tablet     topiramate (TOPAMAX) 25 MG tablet     traZODone (DESYREL) 50 MG tablet     amphetamine-dextroamphetamine (ADDERALL XR) 30 MG per 24 hr capsule     CHROMIUM PO     albuterol (PROAIR HFA/PROVENTIL HFA/VENTOLIN HFA) 108 (90 BASE) MCG/ACT Inhaler     Multiple Vitamins-Minerals (QC WOMENS DAILY MULTIVITAMIN) TABS     cholecalciferol (VITAMIN D3) 1000 UNIT tablet     Calcium-Magnesium-Zinc 333-133-5 MG TABS per tablet     fluticasone  "(FLONASE) 50 MCG/ACT nasal spray     No current facility-administered medications for this visit.        [place] and [place] Prescription Monitoring Program reviewed    Allergies:     Allergies   Allergen Reactions     Sulfa Drugs Hives     Amitriptyline      Other reaction(s): Other, see comments  Hair loss     Lyrica [Pregabalin]      Other reaction(s): Edema,generalized  Other reaction(s): Edema     Nortriptyline      Other reaction(s): Alopecia  Other reaction(s): Other, see comments  Hairloss, increased blood pressure per patient     Amoxicillin Rash     And itching      Clindamycin Rash     Airway impermeant      Gabapentin Rash       Family History:  family history is not on file.    Review Of Systems    14 point ROS negative except per HPI    Objective:     /88  Pulse 88  Resp 16  Ht 1.676 m (5' 6\")  Wt 77.1 kg (170 lb)  BMI 27.44 kg/m2  Body mass index is 27.44 kg/(m^2).    General: In no apparent distress  Mental status: Normal affect, pleasant  Head: Atraumatic, normocephalic  Eyes: Extra-ocular movements grossly intact, no scleral icterus  Cardiovascular: Regular rate  Respiratory: No respiratory distress  Abdomen: soft, non-distended  Msk: Bilateral hips, knees have normal range of motion. Pain with extension and rotation of lumbar spine  Neuro: AAOx3.   CN II-XII are grossly intact.   Back: tenderness to palpation over the lumbar parapsinous musculature, positive Juan's on the right, negative SLR bilaterally, negative facet loading bilaterally, marked tenderness to palpation over the right SI joint  Skin: No rashes or lesions noted on exposed areas of skin  Lymph: no supraclavicular lymphadenopathy      Chaperone present during the entire Physical Exam: None    Imaging: Reviewed    Assessment:    Phyllis is a 47-year-old female who presents to the pain clinic as a new patient consultation. Her main complaint at today's visit his low back pain and right lower extremity pain. Of note, Phyllis is being " referred to the pain clinic by Dr. Felix Floyd in Neurosurgery. She underwent right-sided L4-L5 hemilaminectomy in 2015, which was not helpful for her pain symptomatology. Based upon history, physical exam, review of the medical record, laboratory studies and radiographs, the most likely diagnoses are neuropathic pain, myofascial pain syndrome, deconditioning, sacroiliitis, postlaminectomy syndrome, tobacco abuse and nociceptive pain. She underwent sacroiliac joint injection in December 2017, which initially caused some tightness in her hip; however, she feels that the injection has helped her significantly with her lower back pain and buttocks pain. At today's visit, she continues to complain of low back pain secondary to postlaminectomy syndrome with radiation into the right lower extremity. Upon discussion of her case with Dr. Felix Floyd in neurosurgery the decision has been made for me to proceed with percutaneous spinal cord stimulator trial. Thus, I will proceed with percutaneous SCS trial with [facility] [facility] [employer] technology on April 27, 2018.     Plan:     1. Proceed with SCS trial right lead at the bottom of T8, left lead at mid T7  2. Schedule SCS trial on April 27th with [facility]

## 2018-03-19 NOTE — LETTER
3/19/2018       RE: Phyllis Vela  [address] [facility] [address]     Dear Colleague,    Thank you for referring your patient, Phyllis Vela, to the [facility] at [facility]. Please see a copy of my visit note below.    Subjective:    47 year old female presents for evaluation of low back pain.     She was last seen in clinic on 18:    1. Reach out to Dr. Floyd for possible SCS trial  2. Continue Topamax 25mg BID  3. Continue Flexeril 10mg QHS PRN  4. RTC in 4 weeks    Since the previous visit, Phyllis has decided to proceed with SCS trial.    She underwent right SI injection on 17.      Past Medical History:   Diagnosis Date     ADD (attention deficit disorder) 2013     ADHD (attention deficit hyperactivity disorder)      Chronic back pain 2012     DDD (degenerative disc disease), lumbar 2011     Depressive disorder      Generalized anxiety disorder 2011     Head injury     concussion as a child     Hyperglycemia 2010     Hyperglyceridemia 2010     Insomnia 10/31/2013     Lumbar stenosis 2011    past medical history reviewed with patient.   Past Surgical History:   Procedure Laterality Date     BACK SURGERY Right 2015    Right L4/5 hemilaminectomy, discectomy     BREAST SURGERY Right 06/10/2004    breast biopsy      SECTION        SECTION        SECTION       CHOLECYSTECTOMY       INJECT SACROILIAC JOINT Right 2017    Procedure: INJECT SACROILIAC JOINT;  Right Sacroiliac Joint Injection;  Surgeon: Felix Aponte MD;  [facility]: [facility] OR    past surgical history reviewed with patient.     Medications:    Current Outpatient Prescriptions   Medication     cyclobenzaprine (FLEXERIL) 10 MG tablet     topiramate (TOPAMAX) 25 MG tablet     traZODone (DESYREL) 50 MG tablet     amphetamine-dextroamphetamine (ADDERALL XR) 30 MG per  "24 hr capsule     CHROMIUM PO     albuterol (PROAIR HFA/PROVENTIL HFA/VENTOLIN HFA) 108 (90 BASE) MCG/ACT Inhaler     Multiple Vitamins-Minerals (QC WOMENS DAILY MULTIVITAMIN) TABS     cholecalciferol (VITAMIN D3) 1000 UNIT tablet     Calcium-Magnesium-Zinc 333-133-5 MG TABS per tablet     fluticasone (FLONASE) 50 MCG/ACT nasal spray     No current facility-administered medications for this visit.        [place] Prescription Monitoring [place] reviewed    Allergies:     Allergies   Allergen Reactions     Sulfa Drugs Hives     Amitriptyline      Other reaction(s): Other, see comments  Hair loss     Lyrica [Pregabalin]      Other reaction(s): Edema,generalized  Other reaction(s): Edema     Nortriptyline      Other reaction(s): Alopecia  Other reaction(s): Other, see comments  Hairloss, increased blood pressure per patient     Amoxicillin Rash     And itching      Clindamycin Rash     Airway impermeant      Gabapentin Rash       Family History:  family history is not on file.    Review Of Systems    14 point ROS negative except per HPI    Objective:     /88  Pulse 88  Resp 16  Ht 1.676 m (5' 6\")  Wt 77.1 kg (170 lb)  BMI 27.44 kg/m2  Body mass index is 27.44 kg/(m^2).    General: In no apparent distress  Mental status: Normal affect, pleasant  Head: Atraumatic, normocephalic  Eyes: Extra-ocular movements grossly intact, no scleral icterus  Cardiovascular: Regular rate  Respiratory: No respiratory distress  Abdomen: soft, non-distended  Msk: Bilateral hips, knees have normal range of motion. Pain with extension and rotation of lumbar spine  Neuro: AAOx3.   CN II-XII are grossly intact.   Back: tenderness to palpation over the lumbar parapsinous musculature, positive Juan's on the right, negative SLR bilaterally, negative facet loading bilaterally, marked tenderness to palpation over the right SI joint  Skin: No rashes or lesions noted on exposed areas of skin  Lymph: no supraclavicular " lymphadenopathy      Chaperone present during the entire Physical Exam: None    Imaging: Reviewed    Assessment:    Phyllis is a 47-year-old female who presents to the pain clinic as a new patient consultation. Her main complaint at today's visit his low back pain and right lower extremity pain. Of note, Phyllis is being referred to the pain clinic by Dr. Felix Floyd in Neurosurgery. She underwent right-sided L4-L5 hemilaminectomy in 2015, which was not helpful for her pain symptomatology. Based upon history, physical exam, review of the medical record, laboratory studies and radiographs, the most likely diagnoses are neuropathic pain, myofascial pain syndrome, deconditioning, sacroiliitis, postlaminectomy syndrome, tobacco abuse and nociceptive pain. She underwent sacroiliac joint injection in December 2017, which initially caused some tightness in her hip; however, she feels that the injection has helped her significantly with her lower back pain and buttocks pain. At today's visit, she continues to complain of low back pain secondary to postlaminectomy syndrome with radiation into the right lower extremity. Upon discussion of her case with Dr. Felix Floyd in neurosurgery the decision has been made for me to proceed with percutaneous spinal cord stimulator trial. Thus, I will proceed with percutaneous SCS trial with [facility] [facility] Bur[employer] technology on April 27, 2018.     Plan:     1. Proceed with SCS trial right lead at the bottom of T8, left lead at mid T7  2. Schedule SCS trial on April 27th with [facility]      Again, thank you for allowing me to participate in the care of your patient.      Sincerely,    Felix Aponte MD

## 2018-03-20 ASSESSMENT — ANXIETY QUESTIONNAIRES: GAD7 TOTAL SCORE: 1

## 2018-03-22 ENCOUNTER — TELEPHONE (OUTPATIENT)
Dept: ANESTHESIOLOGY | Facility: CLINIC | Age: 48
End: 2018-03-22

## 2018-03-22 LAB
BACTERIA SPEC CULT: ABNORMAL
Lab: ABNORMAL
SPECIMEN SOURCE: ABNORMAL

## 2018-03-22 NOTE — TELEPHONE ENCOUNTER
Received call from patient to schedule her spinal cord stimulator trial. She mentioned that a date in April had been discussed, but she is considering pushing it back to May. Please call her back to discuss.

## 2018-03-23 NOTE — TELEPHONE ENCOUNTER
I called Phyllis today to discuss her SCS trial date.     I have her scheduled on 5/11/2018, she was moved there from 4/24/18 due to having someone to help her.     She realized on the phone that 5/11/18 is the fishing opener weekend and she may not have help. She decided to leave it on that day and would find someone to get her here and be with her after.     Her F/U is scheduled with Dr. Aponte on Monday 5/21/18.

## 2018-04-05 ENCOUNTER — MYC REFILL (OUTPATIENT)
Dept: ANESTHESIOLOGY | Facility: CLINIC | Age: 48
End: 2018-04-05

## 2018-04-05 DIAGNOSIS — M62.830 BACK MUSCLE SPASM: ICD-10-CM

## 2018-04-06 RX ORDER — CYCLOBENZAPRINE HCL 10 MG
10 TABLET ORAL
Qty: 30 TABLET | Refills: 6 | Status: SHIPPED | OUTPATIENT
Start: 2018-04-06 | End: 2019-01-25

## 2018-04-06 NOTE — TELEPHONE ENCOUNTER
Message from [employer]:  Original authorizing provider: MD Phyllis Hagen would like a refill of the following medications:  cyclobenzaprine (FLEXERIL) 10 MG tablet [Felix Aponte MD]    Preferred pharmacy: [facility] [address] [place] [address]  [address]    Comment:  [facility] pharmacy contacted you for a refill and has not received a response. They suggested I contact you. Please advise. Thanks in advance. Phyllis Vela [phone]

## 2018-04-09 ENCOUNTER — TELEPHONE (OUTPATIENT)
Dept: ANESTHESIOLOGY | Facility: CLINIC | Age: 48
End: 2018-04-09

## 2018-04-09 NOTE — TELEPHONE ENCOUNTER
I called Phyllis today to confirm that she had a  for 5/11/2018.     Phyllis confirmed that she did have someone to help her. I have her scheduled on 5/11/2018 at the [facility] OR.

## 2018-04-24 ENCOUNTER — TRANSFERRED RECORDS (OUTPATIENT)
Dept: HEALTH INFORMATION MANAGEMENT | Facility: CLINIC | Age: 48
End: 2018-04-24

## 2018-04-26 DIAGNOSIS — M79.2 NEUROPATHIC PAIN: ICD-10-CM

## 2018-04-26 RX ORDER — TOPIRAMATE 25 MG/1
TABLET, FILM COATED ORAL
Qty: 90 TABLET | Refills: 3 | Status: SHIPPED | OUTPATIENT
Start: 2018-04-26 | End: 2018-10-01

## 2018-05-11 ENCOUNTER — APPOINTMENT (OUTPATIENT)
Dept: GENERAL RADIOLOGY | Facility: CLINIC | Age: 48
End: 2018-05-11
Attending: ANESTHESIOLOGY
Payer: COMMERCIAL

## 2018-05-11 ENCOUNTER — TRANSFERRED RECORDS (OUTPATIENT)
Dept: SURGERY | Facility: AMBULATORY SURGERY CENTER | Age: 48
End: 2018-05-11

## 2018-05-11 ENCOUNTER — ANESTHESIA (OUTPATIENT)
Dept: SURGERY | Facility: CLINIC | Age: 48
End: 2018-05-11
Payer: COMMERCIAL

## 2018-05-11 ENCOUNTER — SURGERY (OUTPATIENT)
Age: 48
End: 2018-05-11

## 2018-05-11 ENCOUNTER — ANESTHESIA EVENT (OUTPATIENT)
Dept: SURGERY | Facility: CLINIC | Age: 48
End: 2018-05-11
Payer: COMMERCIAL

## 2018-05-11 ENCOUNTER — HOSPITAL ENCOUNTER (OUTPATIENT)
Facility: CLINIC | Age: 48
Discharge: HOME | End: 2018-05-11
Attending: ANESTHESIOLOGY | Admitting: ANESTHESIOLOGY
Payer: COMMERCIAL

## 2018-05-11 VITALS
BODY MASS INDEX: 24.13 KG/M2 | WEIGHT: 150.13 LBS | HEART RATE: 78 BPM | SYSTOLIC BLOOD PRESSURE: 124 MMHG | TEMPERATURE: 98 F | DIASTOLIC BLOOD PRESSURE: 82 MMHG | RESPIRATION RATE: 16 BRPM | HEIGHT: 66 IN | OXYGEN SATURATION: 98 %

## 2018-05-11 DIAGNOSIS — M54.16 LUMBAR RADICULOPATHY: Primary | ICD-10-CM

## 2018-05-11 LAB
B-HCG SERPL-ACNC: <1 IU/L (ref 0–5)
GLUCOSE BLDC GLUCOMTR-MCNC: 118 MG/DL (ref 70–99)
GLUCOSE BLDC GLUCOMTR-MCNC: 82 MG/DL (ref 70–99)

## 2018-05-11 PROCEDURE — 84702 CHORIONIC GONADOTROPIN TEST: CPT | Performed by: ANESTHESIOLOGY

## 2018-05-11 PROCEDURE — 40000277 XR SURGERY CARM FLUORO LESS THAN 5 MIN W STILLS: Mod: TC

## 2018-05-11 PROCEDURE — 37000008 ZZH ANESTHESIA TECHNICAL FEE, 1ST 30 MIN: Performed by: ANESTHESIOLOGY

## 2018-05-11 PROCEDURE — 25000128 H RX IP 250 OP 636: Performed by: NURSE ANESTHETIST, CERTIFIED REGISTERED

## 2018-05-11 PROCEDURE — 40000171 ZZH STATISTIC PRE-PROCEDURE ASSESSMENT III: Performed by: ANESTHESIOLOGY

## 2018-05-11 PROCEDURE — 71000027 ZZH RECOVERY PHASE 2 EACH 15 MINS: Performed by: ANESTHESIOLOGY

## 2018-05-11 PROCEDURE — 25000128 H RX IP 250 OP 636: Performed by: ANESTHESIOLOGY

## 2018-05-11 PROCEDURE — 36000066 ZZH SURGERY LEVEL 4 W FLUORO 1ST 30 MIN - UMMC: Performed by: ANESTHESIOLOGY

## 2018-05-11 PROCEDURE — 36415 COLL VENOUS BLD VENIPUNCTURE: CPT | Performed by: ANESTHESIOLOGY

## 2018-05-11 PROCEDURE — 37000009 ZZH ANESTHESIA TECHNICAL FEE, EACH ADDTL 15 MIN: Performed by: ANESTHESIOLOGY

## 2018-05-11 PROCEDURE — 40000141 ZZH STATISTIC PERIPHERAL IV START W/O US GUIDANCE

## 2018-05-11 PROCEDURE — 82962 GLUCOSE BLOOD TEST: CPT

## 2018-05-11 PROCEDURE — 27210995 ZZH RX 272: Performed by: ANESTHESIOLOGY

## 2018-05-11 PROCEDURE — 27210794 ZZH OR GENERAL SUPPLY STERILE: Performed by: ANESTHESIOLOGY

## 2018-05-11 PROCEDURE — 36000064 ZZH SURGERY LEVEL 4 EA 15 ADDTL MIN - UMMC: Performed by: ANESTHESIOLOGY

## 2018-05-11 PROCEDURE — C1897 LEAD, NEUROSTIM TEST KIT: HCPCS | Performed by: ANESTHESIOLOGY

## 2018-05-11 PROCEDURE — 25000125 ZZHC RX 250

## 2018-05-11 PROCEDURE — 25000125 ZZHC RX 250: Performed by: NURSE ANESTHETIST, CERTIFIED REGISTERED

## 2018-05-11 DEVICE — IMPLANTABLE DEVICE
Type: IMPLANTABLE DEVICE | Site: SPINE THORACIC | Status: NON-FUNCTIONAL
Removed: 2018-05-21

## 2018-05-11 RX ORDER — MAGNESIUM HYDROXIDE 1200 MG/15ML
LIQUID ORAL PRN
Status: DISCONTINUED | OUTPATIENT
Start: 2018-05-11 | End: 2018-05-11 | Stop reason: HOSPADM

## 2018-05-11 RX ORDER — PROPOFOL 10 MG/ML
INJECTION, EMULSION INTRAVENOUS PRN
Status: DISCONTINUED | OUTPATIENT
Start: 2018-05-11 | End: 2018-05-11

## 2018-05-11 RX ORDER — OXYCODONE AND ACETAMINOPHEN 7.5; 325 MG/1; MG/1
1 TABLET ORAL EVERY 6 HOURS PRN
Qty: 20 TABLET | Refills: 0 | Status: SHIPPED | OUTPATIENT
Start: 2018-05-11 | End: 2018-08-28

## 2018-05-11 RX ORDER — DOXYCYCLINE 100 MG/1
100 CAPSULE ORAL 2 TIMES DAILY
Qty: 20 CAPSULE | Refills: 0 | Status: SHIPPED | OUTPATIENT
Start: 2018-05-11 | End: 2018-06-28

## 2018-05-11 RX ORDER — DEXAMETHASONE SODIUM PHOSPHATE 4 MG/ML
INJECTION, SOLUTION INTRA-ARTICULAR; INTRALESIONAL; INTRAMUSCULAR; INTRAVENOUS; SOFT TISSUE PRN
Status: DISCONTINUED | OUTPATIENT
Start: 2018-05-11 | End: 2018-05-11

## 2018-05-11 RX ORDER — SODIUM CHLORIDE, SODIUM LACTATE, POTASSIUM CHLORIDE, CALCIUM CHLORIDE 600; 310; 30; 20 MG/100ML; MG/100ML; MG/100ML; MG/100ML
INJECTION, SOLUTION INTRAVENOUS CONTINUOUS
Status: DISCONTINUED | OUTPATIENT
Start: 2018-05-11 | End: 2018-05-11 | Stop reason: HOSPADM

## 2018-05-11 RX ORDER — VANCOMYCIN HYDROCHLORIDE 1 G/200ML
1000 INJECTION, SOLUTION INTRAVENOUS ONCE
Status: COMPLETED | OUTPATIENT
Start: 2018-05-11 | End: 2018-05-11

## 2018-05-11 RX ORDER — DEXMEDETOMIDINE HYDROCHLORIDE 4 UG/ML
0.2-1.2 INJECTION, SOLUTION INTRAVENOUS CONTINUOUS
Status: DISCONTINUED | OUTPATIENT
Start: 2018-05-11 | End: 2018-05-11 | Stop reason: HOSPADM

## 2018-05-11 RX ORDER — NALOXONE HYDROCHLORIDE 0.4 MG/ML
.1-.4 INJECTION, SOLUTION INTRAMUSCULAR; INTRAVENOUS; SUBCUTANEOUS
Status: DISCONTINUED | OUTPATIENT
Start: 2018-05-11 | End: 2018-05-11 | Stop reason: HOSPADM

## 2018-05-11 RX ORDER — MEPERIDINE HYDROCHLORIDE 25 MG/ML
12.5 INJECTION INTRAMUSCULAR; INTRAVENOUS; SUBCUTANEOUS
Status: DISCONTINUED | OUTPATIENT
Start: 2018-05-11 | End: 2018-05-11 | Stop reason: HOSPADM

## 2018-05-11 RX ORDER — ONDANSETRON 2 MG/ML
INJECTION INTRAMUSCULAR; INTRAVENOUS PRN
Status: DISCONTINUED | OUTPATIENT
Start: 2018-05-11 | End: 2018-05-11

## 2018-05-11 RX ORDER — LIDOCAINE HYDROCHLORIDE 20 MG/ML
INJECTION, SOLUTION INFILTRATION; PERINEURAL PRN
Status: DISCONTINUED | OUTPATIENT
Start: 2018-05-11 | End: 2018-05-11

## 2018-05-11 RX ORDER — SODIUM CHLORIDE, SODIUM LACTATE, POTASSIUM CHLORIDE, CALCIUM CHLORIDE 600; 310; 30; 20 MG/100ML; MG/100ML; MG/100ML; MG/100ML
INJECTION, SOLUTION INTRAVENOUS CONTINUOUS PRN
Status: DISCONTINUED | OUTPATIENT
Start: 2018-05-11 | End: 2018-05-11

## 2018-05-11 RX ORDER — ONDANSETRON 2 MG/ML
4 INJECTION INTRAMUSCULAR; INTRAVENOUS EVERY 30 MIN PRN
Status: DISCONTINUED | OUTPATIENT
Start: 2018-05-11 | End: 2018-05-11 | Stop reason: HOSPADM

## 2018-05-11 RX ORDER — CETIRIZINE HYDROCHLORIDE 10 MG/1
10 TABLET ORAL EVERY MORNING
COMMUNITY
Start: 2018-04-11

## 2018-05-11 RX ORDER — ONDANSETRON 4 MG/1
4 TABLET, ORALLY DISINTEGRATING ORAL EVERY 30 MIN PRN
Status: DISCONTINUED | OUTPATIENT
Start: 2018-05-11 | End: 2018-05-11 | Stop reason: HOSPADM

## 2018-05-11 RX ORDER — CEPHALEXIN 500 MG/1
500 CAPSULE ORAL 4 TIMES DAILY
Qty: 40 CAPSULE | Refills: 0 | Status: SHIPPED | OUTPATIENT
Start: 2018-05-11 | End: 2018-06-28

## 2018-05-11 RX ORDER — PROPOFOL 10 MG/ML
INJECTION, EMULSION INTRAVENOUS CONTINUOUS PRN
Status: DISCONTINUED | OUTPATIENT
Start: 2018-05-11 | End: 2018-05-11

## 2018-05-11 RX ORDER — FENTANYL CITRATE 50 UG/ML
INJECTION, SOLUTION INTRAMUSCULAR; INTRAVENOUS PRN
Status: DISCONTINUED | OUTPATIENT
Start: 2018-05-11 | End: 2018-05-11

## 2018-05-11 RX ADMIN — PROPOFOL 20 MG: 10 INJECTION, EMULSION INTRAVENOUS at 14:48

## 2018-05-11 RX ADMIN — LIDOCAINE HYDROCHLORIDE 40 MG: 20 INJECTION, SOLUTION INFILTRATION; PERINEURAL at 13:56

## 2018-05-11 RX ADMIN — FENTANYL CITRATE 50 MCG: 50 INJECTION, SOLUTION INTRAMUSCULAR; INTRAVENOUS at 13:57

## 2018-05-11 RX ADMIN — SODIUM CHLORIDE 500 ML: 900 IRRIGANT IRRIGATION at 14:27

## 2018-05-11 RX ADMIN — DEXAMETHASONE SODIUM PHOSPHATE 4 MG: 4 INJECTION, SOLUTION INTRA-ARTICULAR; INTRALESIONAL; INTRAMUSCULAR; INTRAVENOUS; SOFT TISSUE at 14:38

## 2018-05-11 RX ADMIN — VANCOMYCIN HYDROCHLORIDE 1 G: 1 INJECTION, SOLUTION INTRAVENOUS at 13:56

## 2018-05-11 RX ADMIN — PROPOFOL 75 MCG/KG/MIN: 10 INJECTION, EMULSION INTRAVENOUS at 13:59

## 2018-05-11 RX ADMIN — ONDANSETRON 4 MG: 2 INJECTION INTRAMUSCULAR; INTRAVENOUS at 15:08

## 2018-05-11 RX ADMIN — SODIUM CHLORIDE, POTASSIUM CHLORIDE, SODIUM LACTATE AND CALCIUM CHLORIDE: 600; 310; 30; 20 INJECTION, SOLUTION INTRAVENOUS at 13:51

## 2018-05-11 RX ADMIN — MIDAZOLAM 1 MG: 1 INJECTION INTRAMUSCULAR; INTRAVENOUS at 13:52

## 2018-05-11 RX ADMIN — MIDAZOLAM 1 MG: 1 INJECTION INTRAMUSCULAR; INTRAVENOUS at 13:47

## 2018-05-11 ASSESSMENT — PAIN DESCRIPTION - DESCRIPTORS: DESCRIPTORS: PENETRATING;JABBING

## 2018-05-11 NOTE — OR NURSING
Rep from [facility] at bedside in Phase II recovery to calibrate stimulator and to give pt and son education regarding stimulator unit.

## 2018-05-11 NOTE — IP AVS SNAPSHOT
MRN:[id number]                      After Visit Summary   5/11/2018    Phyllis Vela    MRN: [id number]           Thank you!     Thank you for choosing [facility] for your care. Our goal is always to provide you with excellent care. Hearing back from our patients is one way we can continue to improve our services. Please take a few minutes to complete the written survey that you may receive in the mail after you visit with us. Thank you!        Patient Information     Date Of Birth          1970        About your hospital stay     You were admitted on:  May 11, 2018 You last received care in the:  Same Day Surgery [facility]    You were discharged on:  May 11, 2018       Who to Call     For medical emergencies, please call 911.  For non-urgent questions about your medical care, please call your primary care provider or clinic, [phone]  For questions related to your surgery, please call your surgery clinic        Attending Provider     Provider Specialty    Felix Aponte MD Anesthesiology       Primary Care Provider Office Phone # Fax #    Sonya Beasley PA-C [phone]      Your next 10 appointments already scheduled     May 21, 2018  7:20 AM CDT   (Arrive by 7:05 AM)   Return Visit with Felix Aponte MD   [facility] ([facility])    [address] [place] [id number] [phone]              Further instructions from your care team       POST-TRIAL INSTRUCTIONS FOR SPINAL CORD STIMULATOR (SCS) PATIENTS    During the Trial period:  Do:    No strenuous activity today, gradually return to your normal activity.    Continue normal activities, especially ones that cause familiar pain.    Build up your physical activity by walking for brief periods each day.    Sleep on your back or on a firm mattress that supports your back and legs  equally.    Use caution when changing position.    Move your body without twisting by moving your shoulders and hips at the same time.     Do Not:    Reach, bend or stretch.    Lift more than five pounds.    Pull on the trial cable or leads.    Disconnect the trial cable from leads.    Drive with the system on .    Operate machinery or power equipment with the transmitter on.    Climb many stairs.    Sleep on your stomach.    Take shower/baths for 7-10 after surgery- until follow up appointment.    To promote the healing of your wound:  Small dressings will cover your wounds, do the following:    Keep the dressings clean and dry. Do not remove the dressings; the physician will remove the dressing at your 1 week appointment.  If you do not have an appointment scheduled, the office will call you to schedule.    Take sponge baths and avoid getting the dressings wet.    Call your physician if you notice any of the following symptoms of an infection:  fever, chills, increased pain at the incision site, excessive drainage, pus or redness.    SCS Trial Instructions    The screening period lasts about 5-10 days.    Turn up the controls slowly.  This will prevent sudden and uncomfortable stimulation.    During the trial period the  Stimulation sensation will change depending on body. position.  You may have to increase or decrease the amplitude for comfort.  These changes are normal.    Stimulation needs may be greater when sitting, standing, walking, Amplitude needs will increase as your activity increases.    Try turning the stimulator off when pain is relieved, and on again at the onset of pain    Keep screener dry.    [facility]  Same-Day Surgery   Adult Discharge Orders & Instructions     For 24 hours after surgery    1. Get plenty of rest.  A responsible adult must stay with you for at least 24 hours after you leave the hospital.   2. Do not drive or use heavy equipment.  If you  "have weakness or tingling, don't drive or use heavy equipment until this feeling goes away.  3. Do not drink alcohol.  4. Avoid strenuous or risky activities.  Ask for help when climbing stairs.   5. You may feel lightheaded.  IF so, sit for a few minutes before standing.  Have someone help you get up.   6. If you have nausea (feel sick to your stomach): Drink only clear liquids such as apple juice, ginger ale, broth or 7-Up.  Rest may also help.  Be sure to drink enough fluids.  Move to a regular diet as you feel able.  7. You may have a slight fever. Call the doctor if your fever is over 100 F (37.7 C) (taken under the tongue) or lasts longer than 24 hours.  8. You may have a dry mouth, a sore throat, muscle aches or trouble sleeping.  These should go away after 24 hours.  9. Do not make important or legal decisions.   Call your doctor for any of the followin.  Signs of infection (fever, growing tenderness at the surgery site, a large amount of drainage or bleeding, severe pain, foul-smelling drainage, redness, swelling).    2. It has been over 8 to 10 hours since surgery and you are still not able to urinate (pass water).    3.  Headache for over 24 hours.    To contact a doctor during clinic hours, call Dr. Aponte' clinic at [phone] or:        After hours: [phone] and ask for the resident on call for pain management team (answered 24 hours a day)      Emergency Department:    [facility]: [phone]       (TTY for hearing impaired: [phone])            Pending Results     No orders found from 2018 to 2018.            Admission Information     Date & Time Provider Department Dept. Phone    2018 Felix Aponte MD Same Day Surgery [facility] [phone]      Your Vitals Were     Blood Pressure Pulse Temperature Respirations Height Weight    123/86 78 97.9  F (36.6  C) (Oral) 16 1.676 m (5' 6\") 68.1 kg (150 lb 2.1 oz)    Pulse Oximetry BMI (Body " Mass Index)                98% 24.23 kg/m2          [facility] Information     [facility] gives you secure access to your electronic health record. If you see a primary care provider, you can also send messages to your care team and make appointments. If you have questions, please call your primary care clinic.  If you do not have a primary care provider, please call [phone] and they will assist you.        Care EveryWhere ID     This is your Care EveryWhere ID. This could be used by other organizations to access your [facility] medical records  [id number]        Equal Access to Services     [facility] : Haddwight Carmen, walatasha luqadaha, qabailee kaalnguyen hays, janine womack . So [facility] [phone].    ATENCIÓN: Si habla español, tiene a watson disposición servicios gratuitos de asistencia lingüística. Llame al [phone].    We comply with applicable federal civil rights laws and [place] laws. We do not discriminate on the basis of race, color, national origin, age, disability, sex, sexual orientation, or gender identity.               Review of your medicines      START taking        Dose / Directions    cephALEXin 500 MG capsule   Commonly known as:  KEFLEX   Used for:  Lumbar radiculopathy        Dose:  500 mg   Take 1 capsule (500 mg) by mouth 4 times daily   Quantity:  40 capsule   Refills:  0       doxycycline 100 MG capsule   Commonly known as:  VIBRAMYCIN   Used for:  Lumbar radiculopathy        Dose:  100 mg   Take 1 capsule (100 mg) by mouth 2 times daily   Quantity:  20 capsule   Refills:  0       oxyCODONE-acetaminophen 7.5-325 MG per tablet   Commonly known as:  PERCOCET   Used for:  Lumbar radiculopathy        Dose:  1 tablet   Take 1 tablet by mouth every 6 hours as needed for severe pain   Quantity:  20 tablet   Refills:  0         CONTINUE these medicines which have NOT CHANGED        Dose / Directions    albuterol 108 (90 Base) MCG/ACT Inhaler   Commonly  known as:  PROAIR HFA/PROVENTIL HFA/VENTOLIN HFA        Dose:  2 puff   Inhale 2 puffs into the lungs as needed   Refills:  0       amphetamine-dextroamphetamine 30 MG per 24 hr capsule   Commonly known as:  ADDERALL XR        Dose:  1 capsule   Take 1 capsule by mouth as needed   Refills:  0       Calcium-Magnesium-Zinc 333-133-5 MG Tabs per tablet        Dose:  1 tablet   Take 1 tablet by mouth daily   Refills:  0       cetirizine 10 MG tablet   Commonly known as:  zyrTEC        Dose:  10 mg   Take 10 mg by mouth   Refills:  0       cholecalciferol 1000 UNIT tablet   Commonly known as:  vitamin D3        Dose:  1000 Units   Take 1,000 Units by mouth daily   Refills:  0       CHROMIUM PO        Dose:  1 tablet   Take 1 tablet by mouth daily   Refills:  0       cyclobenzaprine 10 MG tablet   Commonly known as:  FLEXERIL   Used for:  Back muscle spasm        Dose:  10 mg   Take 1 tablet (10 mg) by mouth nightly as needed for muscle spasms   Quantity:  30 tablet   Refills:  6       fluticasone 50 MCG/ACT spray   Commonly known as:  FLONASE        Dose:  2 spray   Spray 2 sprays into both nostrils daily   Refills:  0       metFORMIN 500 MG tablet   Commonly known as:  GLUCOPHAGE        Dose:  500 mg   Take 500 mg by mouth   Refills:  0       QC WOMENS DAILY MULTIvitamin Tabs        Dose:  1 tablet   Take 1 tablet by mouth daily   Refills:  0       topiramate 25 MG tablet   Commonly known as:  TOPAMAX   Used for:  Neuropathic pain        Take 25 mg in AM, and 50 mg in the Evening.   Quantity:  90 tablet   Refills:  3       traZODone 50 MG tablet   Commonly known as:  DESYREL        Dose:  1-2 tablet   Take 1-2 tablets by mouth daily   Refills:  0            Where to get your medicines      These medications were sent to "[facility] - [place], [place] - [address]  [address]  [address], [facility] [address]     Phone:  [phone]     doxycycline 100 MG capsule          Some of these will need a paper prescription and others can be bought over the counter. Ask your nurse if you have questions.     Bring a paper prescription for each of these medications     cephALEXin 500 MG capsule    oxyCODONE-acetaminophen 7.5-325 MG per tablet                Protect others around you: Learn how to safely use, store and throw away your medicines at www.disposemymeds.org.        ANTIBIOTIC INSTRUCTION     You've Been Prescribed an Antibiotic - Now What?  Your healthcare team thinks that you or your loved one might have an infection. Some infections can be treated with antibiotics, which are powerful, life-saving drugs. Like all medications, antibiotics have side effects and should only be used when necessary. There are some important things you should know about your antibiotic treatment.      Your healthcare team may run tests before you start taking an antibiotic.    Your team may take samples (e.g., from your blood, urine or other areas) to run tests to look for bacteria. These test can be important to determine if you need an antibiotic at all and, if you do, which antibiotic will work best.      Within a few days, your healthcare team might change or even stop your antibiotic.    Your team may start you on an antibiotic while they are working to find out what is making you sick.    Your team might change your antibiotic because test results show that a different antibiotic would be better to treat your infection.    In some cases, once your team has more information, they learn that you do not need an antibiotic at all. They may find out that you don't have an infection, or that the antibiotic you're taking won't work against your infection. For example, an infection caused by a virus can't be treated with antibiotics. Staying on an antibiotic when you don't need it is more likely to be harmful than helpful.      You may experience side effects from your antibiotic.    Like all  medications, antibiotics have side effects. Some of these can be serious.    Let you healthcare team know if you have any known allergies when you are admitted to the hospital.    One significant side effect of nearly all antibiotics is the risk of severe and sometimes deadly diarrhea caused by Clostridium difficile (C. Difficile). This occurs when a person takes antibiotics because some good germs are destroyed. Antibiotic use allows C. diificile to take over, putting patients at high risk for this serious infection.    As a patient or caregiver, it is important to understand your or your loved one's antibiotic treatment. It is especially important for caregivers to speak up when patients can't speak for themselves. Here are some important questions to ask your healthcare team.    What infection is this antibiotic treating and how do you know I have that infection?    What side effects might occur from this antibiotic?    How long will I need to take this antibiotic?    Is it safe to take this antibiotic with other medications or supplements (e.g., vitamins) that I am taking?     Are there any special directions I need to know about taking this antibiotic? For example, should I take it with food?    How will I be monitored to know whether my infection is responding to the antibiotic?    What tests may help to make sure the right antibiotic is prescribed for me?      Information provided by:  www.cdc.gov/getsmart  U.S. Department of Health and Human Services  Centers for disease Control and Prevention  National Center for Emerging and Zoonotic Infectious Diseases  Division of Healthcare Quality Promotion        Information about OPIOIDS     PRESCRIPTION OPIOIDS: WHAT YOU NEED TO KNOW   You have a prescription for an opioid (narcotic) pain medicine. Opioids can cause addiction. If you have a history of chemical dependency of any type, you are at a higher risk of becoming addicted to opioids. Only take this medicine  after all other options have been tried. Take it for as short a time and as few doses as possible.     Do not:    Drive. If you drive while taking these medicines, you could be arrested for driving under the influence (DUI).    Operate heavy machinery    Do any other dangerous activities while taking these medicines.     Drink any alcohol while taking these medicines.      Take with any other medicines that contain acetaminophen. Read all labels carefully. Look for the word  acetaminophen  or  Tylenol.  Ask your pharmacist if you have questions or are unsure.    Store your pills in a secure place, locked if possible. We will not replace any lost or stolen medicine. If you don t finish your medicine, please throw away (dispose) as directed by your pharmacist. The [place] Pollution Control Agency has more information about safe disposal: https://www.pca.[facility].[place]/living-green/managing-unwanted-medications    All opioids tend to cause constipation. Drink plenty of water and eat foods that have a lot of fiber, such as fruits, vegetables, prune juice, apple juice and high-fiber cereal. Take a laxative (Miralax, milk of magnesia, Colace, Senna) if you don t move your bowels at least every other day.              Medication List: This is a list of all your medications and when to take them. Check marks below indicate your daily home schedule. Keep this list as a reference.      Medications           Morning Afternoon Evening Bedtime As Needed    albuterol 108 (90 Base) MCG/ACT Inhaler   Commonly known as:  PROAIR HFA/PROVENTIL HFA/VENTOLIN HFA   Inhale 2 puffs into the lungs as needed                                amphetamine-dextroamphetamine 30 MG per 24 hr capsule   Commonly known as:  ADDERALL XR   Take 1 capsule by mouth as needed                                Calcium-Magnesium-Zinc 333-133-5 MG Tabs per tablet   Take 1 tablet by mouth daily                                cephALEXin 500 MG capsule   Commonly  known as:  KEFLEX   Take 1 capsule (500 mg) by mouth 4 times daily                                cetirizine 10 MG tablet   Commonly known as:  zyrTEC   Take 10 mg by mouth                                cholecalciferol 1000 UNIT tablet   Commonly known as:  vitamin D3   Take 1,000 Units by mouth daily                                CHROMIUM PO   Take 1 tablet by mouth daily                                cyclobenzaprine 10 MG tablet   Commonly known as:  FLEXERIL   Take 1 tablet (10 mg) by mouth nightly as needed for muscle spasms                                doxycycline 100 MG capsule   Commonly known as:  VIBRAMYCIN   Take 1 capsule (100 mg) by mouth 2 times daily                                fluticasone 50 MCG/ACT spray   Commonly known as:  FLONASE   Spray 2 sprays into both nostrils daily                                metFORMIN 500 MG tablet   Commonly known as:  GLUCOPHAGE   Take 500 mg by mouth                                oxyCODONE-acetaminophen 7.5-325 MG per tablet   Commonly known as:  PERCOCET   Take 1 tablet by mouth every 6 hours as needed for severe pain                                QC WOMENS DAILY MULTIvitamin Tabs   Take 1 tablet by mouth daily                                topiramate 25 MG tablet   Commonly known as:  TOPAMAX   Take 25 mg in AM, and 50 mg in the Evening.                                traZODone 50 MG tablet   Commonly known as:  DESYREL   Take 1-2 tablets by mouth daily

## 2018-05-11 NOTE — OP NOTE
PROCEDURE: SPINAL CORD STIMULATOR TRIAL ([facility])    PREOPERATIVE DIAGNOSIS: lumbar post-laminectomy syndrome    POSTOPERATIVE DIAGNOSIS: lumbar post-laminectomy syndrome    ANESTHESIA: MAC    PREOPERATIVE ANTIBIOTICS: Vancomycin 1g IV given 30 minutes prior to procedure start, see anesthesia record for time.    DESCRIPTION OF PROCEDURE:  The patient was brought to the fluoroscopy suite. IV access was obtained prior to the procedure. The patient was positioned prone on the fluoroscopy table. Continuous hemodynamic monitoring was initiated including blood pressure and pulse oximetry. IV sedation was administered incrementally to allow the patient to remain comfortable and conversant throughout the procedure. The lumbar spine area was prepped with DuraPrep two times and draped in a sterile fashion. Skin anesthesia was achieved using 18cc of lidocaine 2% and bupivacaine 0.5% with 1:200,000 epinephrine mixed in equal parts. A 14-gauge 3.5 inch Tuohy needle was inserted at the T12-L1 interspace using a paramedian approach. The needle was slowly advanced in a 45-degree angle down towards the ligamentum flavum.  Once the ligamentum flavum was reached, a loss of resistance syringe was attached.  The needle was slowly advanced into the dorsal epidural space.  After loss of resistance was noted, negative aspiration for blood or CSF was confirmed . A percutaneous lead was inserted through the needle and slowly advanced in the epidural space under fluoroscopy. The LEFT lead was eventually placed at top of T7. A second lead was then placed in a similar fashion on the RIGHT and placed at mid T8. The leads were sutured in place with 2-0 silk sutures. A sterile dressing was applied. The patient was transferred to the postoperative area in stable condition.  The patient's postoperative neurological examination showed no evidence of lower extremity weakness with normal bowel and bladder function. No specimens collected.      COMPLICATIONS: There were no complications.  ESTIMATED BLOOD LOSS: The estimated blood loss nil.      Plan:    1. RTC in 7-10 days for lead pull  2. Doxycycline 100mg BID for 10 days (#20 tablets dispensed)  3. Percocet 7.5/325 Q6H PRN for 5 days (#20 tablets dispensed)  4. No bathing or showering until the lead pull appointment

## 2018-05-11 NOTE — ANESTHESIA CARE TRANSFER NOTE
Patient: Phyllis Vela    Procedure(s):  Insertion Bilateral Dorsal Column Stimulator (Trial)     - Wound Class: I-Clean    Diagnosis: Pain   Diagnosis Additional Information: No value filed.    Anesthesia Type:   General     Note:  Airway :Room Air  Patient transferred to:Phase II  Comments: Pt transferred to Phase II. AAO.  VSS.  Report to RN.  All questions answered. Handoff Report: Identifed the Patient, Identified the Reponsible Provider, Reviewed the pertinent medical history, Discussed the surgical course, Reviewed Intra-OP anesthesia mangement and issues during anesthesia, Set expectations for post-procedure period and Allowed opportunity for questions and acknowledgement of understanding      Vitals: (Last set prior to Anesthesia Care Transfer)    CRNA VITALS  5/11/2018 1450 - 5/11/2018 1530      5/11/2018             Pulse: 71    SpO2: 100 %                Electronically Signed By: AMIRAH Viera CRNA  May 11, 2018  3:30 PM

## 2018-05-11 NOTE — DISCHARGE INSTRUCTIONS
POST-TRIAL INSTRUCTIONS FOR SPINAL CORD STIMULATOR (SCS) PATIENTS    During the Trial period:  Do:    No strenuous activity today, gradually return to your normal activity.    Continue normal activities, especially ones that cause familiar pain.    Build up your physical activity by walking for brief periods each day.    Sleep on your back or on a firm mattress that supports your back and legs equally.    Use caution when changing position.    Move your body without twisting by moving your shoulders and hips at the same time.     Do Not:    Reach, bend or stretch.    Lift more than five pounds.    Pull on the trial cable or leads.    Disconnect the trial cable from leads.    Drive with the system on .    Operate machinery or power equipment with the transmitter on.    Climb many stairs.    Sleep on your stomach.    Take shower/baths for 7-10 after surgery- until follow up appointment.    To promote the healing of your wound:  Small dressings will cover your wounds, do the following:    Keep the dressings clean and dry. Do not remove the dressings; the physician will remove the dressing at your 1 week appointment.  If you do not have an appointment scheduled, the office will call you to schedule.    Take sponge baths and avoid getting the dressings wet.    Call your physician if you notice any of the following symptoms of an infection:  fever, chills, increased pain at the incision site, excessive drainage, pus or redness.    SCS Trial Instructions    The screening period lasts about 5-10 days.    Turn up the controls slowly.  This will prevent sudden and uncomfortable stimulation.    During the trial period the  Stimulation sensation will change depending on body. position.  You may have to increase or decrease the amplitude for comfort.  These changes are normal.    Stimulation needs may be greater when sitting, standing, walking, Amplitude needs will increase as your activity increases.    Try turning the  stimulator off when pain is relieved, and on again at the onset of pain    Keep screener dry.    [facility] [place]  Same-Day Surgery   Adult Discharge Orders & Instructions     For 24 hours after surgery    1. Get plenty of rest.  A responsible adult must stay with you for at least 24 hours after you leave the hospital.   2. Do not drive or use heavy equipment.  If you have weakness or tingling, don't drive or use heavy equipment until this feeling goes away.  3. Do not drink alcohol.  4. Avoid strenuous or risky activities.  Ask for help when climbing stairs.   5. You may feel lightheaded.  IF so, sit for a few minutes before standing.  Have someone help you get up.   6. If you have nausea (feel sick to your stomach): Drink only clear liquids such as apple juice, ginger ale, broth or 7-Up.  Rest may also help.  Be sure to drink enough fluids.  Move to a regular diet as you feel able.  7. You may have a slight fever. Call the doctor if your fever is over 100 F (37.7 C) (taken under the tongue) or lasts longer than 24 hours.  8. You may have a dry mouth, a sore throat, muscle aches or trouble sleeping.  These should go away after 24 hours.  9. Do not make important or legal decisions.   Call your doctor for any of the followin.  Signs of infection (fever, growing tenderness at the surgery site, a large amount of drainage or bleeding, severe pain, foul-smelling drainage, redness, swelling).    2. It has been over 8 to 10 hours since surgery and you are still not able to urinate (pass water).    3.  Headache for over 24 hours.    To contact a doctor during clinic hours, call Dr. Aponte' clinic at [phone] or:        After hours: [phone] and ask for the resident on call for pain management team (answered 24 hours a day)      Emergency Department:    [facility]: [phone]       (TTY for hearing impaired: [phone])

## 2018-05-11 NOTE — OR NURSING
Dr. Aponte was contacted for op note. He stated he will place brief op note within one hour. Melisa Leary RN also spoke with Dr. Aponte and explained rationale behind having op note in place before pt discharges home.

## 2018-05-11 NOTE — IP AVS SNAPSHOT
Same Day Surgery [address] [address]    Phone:  [phone]                                       After Visit Summary   5/11/2018    Phyllis Vela    MRN: [id number]           After Visit Summary Signature Page     I have received my discharge instructions, and my questions have been answered. I have discussed any challenges I see with this plan with the nurse or doctor.    ..........................................................................................................................................  Patient/Patient Representative Signature      ..........................................................................................................................................  Patient Representative Print Name and Relationship to Patient    ..................................................               ................................................  Date                                            Time    ..........................................................................................................................................  Reviewed by Signature/Title    ...................................................              ..............................................  Date                                                            Time

## 2018-05-11 NOTE — OR NURSING
Dr. Aponte called regarding post op note. Stated he did not have time to do an Op note at this time, but did not want his patient held up waiting. Melisa Wright here and also talked to Dr. Aponte, explaining policy. Patient allowed to go home without note and Dr. Aponte stated he would put note in within the hour.

## 2018-05-11 NOTE — ANESTHESIA PREPROCEDURE EVALUATION
Anesthesia Evaluation     .             ROS/MED HX    ENT/Pulmonary:  - neg pulmonary ROS     Neurologic:     (+)neuropathy other neuro S/P backsurgery and Radiculopathy    Cardiovascular:  - neg cardiovascular ROS       METS/Exercise Tolerance:     Hematologic:  - neg hematologic  ROS       Musculoskeletal:  - neg musculoskeletal ROS       GI/Hepatic:  - neg GI/hepatic ROS       Renal/Genitourinary:  - ROS Renal section negative       Endo:  - neg endo ROS       Psychiatric:  - neg psychiatric ROS       Infectious Disease:  - neg infectious disease ROS       Malignancy:      - no malignancy   Other:    - neg other ROS                 Physical Exam  Normal systems: cardiovascular, pulmonary and dental    Airway   Mallampati: I  TM distance: >3 FB  Neck ROM: full    Dental     Cardiovascular   Rhythm and rate: regular and normal      Pulmonary    breath sounds clear to auscultation                    Anesthesia Plan      History & Physical Review  History and physical reviewed and following examination; no interval change.    ASA Status:  2 .    NPO Status:  > 8 hours    Plan for MAC with Intravenous and Propofol induction. Maintenance will be TIVA.  Reason for MAC:  Deep or markedly invasive procedure (G8)  PONV prophylaxis:  Ondansetron (or other 5HT-3) and Dexamethasone or Solumedrol       Postoperative Care  Postoperative pain management:  Oral pain medications.      Consents  Anesthetic plan, risks, benefits and alternatives discussed with:  Patient..                          .

## 2018-05-16 ENCOUNTER — ALLIED HEALTH/NURSE VISIT (OUTPATIENT)
Dept: ANESTHESIOLOGY | Facility: CLINIC | Age: 48
End: 2018-05-16
Payer: COMMERCIAL

## 2018-05-16 VITALS
HEART RATE: 83 BPM | BODY MASS INDEX: 24.27 KG/M2 | DIASTOLIC BLOOD PRESSURE: 93 MMHG | WEIGHT: 151 LBS | SYSTOLIC BLOOD PRESSURE: 138 MMHG | HEIGHT: 66 IN | RESPIRATION RATE: 16 BRPM

## 2018-05-16 DIAGNOSIS — M79.2 NEUROPATHIC PAIN: Primary | ICD-10-CM

## 2018-05-16 DIAGNOSIS — M54.16 LUMBAR RADICULOPATHY: ICD-10-CM

## 2018-05-16 RX ORDER — TRAMADOL HYDROCHLORIDE 50 MG/1
25-50 TABLET ORAL EVERY 6 HOURS PRN
Qty: 10 TABLET | Refills: 0 | Status: SHIPPED | OUTPATIENT
Start: 2018-05-16 | End: 2018-07-10

## 2018-05-16 ASSESSMENT — PAIN SCALES - GENERAL: PAINLEVEL: MODERATE PAIN (5)

## 2018-05-16 NOTE — NURSING NOTE
Pt's surgical site assessed to be WNL, absent of swelling, redness, and purulent drainage from incision sites.

## 2018-05-16 NOTE — PATIENT INSTRUCTIONS
1. Spinal cord stimulator adjustments made today with [facility] [facility] representative.     Follow up: Trial lead pull 5/21/18 at 7:20am       To speak with a nurse, schedule/reschedule/cancel a clinic appointment, or request a medication refill call: [phone]     You can also reach us by On-Q-ity: [email or web address]    For refills, please call on Monday, 1 week before your medication runs out. The doctors are not always in clinic, so this gives us time to get your prescriptions ready.  Please let us know the name of the medication you are requesting a refill of.

## 2018-05-16 NOTE — MR AVS SNAPSHOT
After Visit Summary   5/16/2018    Phyllis Vela    MRN: [id number]           Patient Information     Date Of Birth          1970        Visit Information        Provider Department      5/16/2018 9:30 AM NurseValdo Adult Chronic Pain [facility]        Today's Diagnoses     Neuropathic pain    -  1    Lumbar radiculopathy          Care Instructions    1. Spinal cord stimulator adjustments made today with [facility] [facility] representative.     Follow up: Trial lead pull 5/21/18 at 7:20am       To speak with a nurse, schedule/reschedule/cancel a clinic appointment, or request a medication refill call: [phone]     You can also reach us by YuanV: [email or web address]    For refills, please call on Monday, 1 week before your medication runs out. The doctors are not always in clinic, so this gives us time to get your prescriptions ready.  Please let us know the name of the medication you are requesting a refill of.                                     Follow-ups after your visit        Your next 10 appointments already scheduled     May 21, 2018  7:20 AM CDT   (Arrive by 7:05 AM)   Return Visit with Felix Aponte MD   [facility] ([facility])    [address] [place] [address] [phone]              Who to contact     Please call your clinic at [phone] to:    Ask questions about your health    Make or cancel appointments    Discuss your medicines    Learn about your test results    Speak to your doctor            Additional Information About Your Visit        MyChart Information     YuanV gives you secure access to your electronic health record. If you see a primary care provider, you can also send messages to your care team and make appointments. If you have questions, please call your primary care clinic.  If you do not have  "a primary care provider, please call [phone] and they will assist you.      FanGager (MyBrandz) is an electronic gateway that provides easy, online access to your medical records. With FanGager (MyBrandz), you can request a clinic appointment, read your test results, renew a prescription or communicate with your care team.     To access your existing account, please contact your [facility] or call [phone] for assistance.        Care EveryWhere ID     This is your Care EveryWhere ID. This could be used by other organizations to access your [facility] medical records  [id number]        Your Vitals Were     Pulse Respirations Height BMI (Body Mass Index)          83 16 1.676 m (5' 6\") 24.37 kg/m2         Blood Pressure from Last 3 Encounters:   05/16/18 (!) 138/93   05/11/18 124/82   03/19/18 125/88    Weight from Last 3 Encounters:   05/16/18 68.5 kg (151 lb)   05/11/18 68.1 kg (150 lb 2.1 oz)   03/19/18 77.1 kg (170 lb)              Today, you had the following     No orders found for display         Today's Medication Changes          These changes are accurate as of 5/16/18 10:37 AM.  If you have any questions, ask your nurse or doctor.               Start taking these medicines.        Dose/Directions    traMADol 50 MG tablet   Commonly known as:  ULTRAM   Used for:  Neuropathic pain, Lumbar radiculopathy   Started by:  Nurse, [facility]        Dose:  25-50 mg   Take 0.5-1 tablets (25-50 mg) by mouth every 6 hours as needed for severe pain   Quantity:  10 tablet   Refills:  0            Where to get your medicines      Some of these will need a paper prescription and others can be bought over the counter.  Ask your nurse if you have questions.     Bring a paper prescription for each of these medications     traMADol 50 MG tablet               Information about OPIOIDS     PRESCRIPTION OPIOIDS: WHAT YOU NEED TO KNOW   You have a prescription for an opioid (narcotic) pain medicine. " Opioids can cause addiction. If you have a history of chemical dependency of any type, you are at a higher risk of becoming addicted to opioids. Only take this medicine after all other options have been tried. Take it for as short a time and as few doses as possible.     Do not:    Drive. If you drive while taking these medicines, you could be arrested for driving under the influence (DUI).    Operate heavy machinery    Do any other dangerous activities while taking these medicines.     Drink any alcohol while taking these medicines.      Take with any other medicines that contain acetaminophen. Read all labels carefully. Look for the word  acetaminophen  or  Tylenol.  Ask your pharmacist if you have questions or are unsure.    Store your pills in a secure place, locked if possible. We will not replace any lost or stolen medicine. If you don t finish your medicine, please throw away (dispose) as directed by your pharmacist. The [place] Pollution Control Agency has more information about safe disposal: https://www.pca.[facility].[place]./living-green/managing-unwanted-medications    All opioids tend to cause constipation. Drink plenty of water and eat foods that have a lot of fiber, such as fruits, vegetables, prune juice, apple juice and high-fiber cereal. Take a laxative (Miralax, milk of magnesia, Colace, Senna) if you don t move your bowels at least every other day.          Primary Care Provider Office Phone # Fax #    Sonya Beasley PA-C [phone]       [facility] [address] [address] [facility] [address]        Equal Access to Services     ОЛЕГ TARANGO : Hadii cece morales hadasho Soomaali, waaxda luqadaha, qaybta kaalmada adeegyada, janine womack . So [facility] [phone].    ATENCIÓN: Si habla español, tiene a watson disposición servicios gratuitos de asistencia lingüística. Llame al [phone].    We comply with applicable federal civil rights laws and [place] laws. We  do not discriminate on the basis of race, color, national origin, age, disability, sex, sexual orientation, or gender identity.            Thank you!     Thank you for choosing [facility]  for your care. Our goal is always to provide you with excellent care. Hearing back from our patients is one way we can continue to improve our services. Please take a few minutes to complete the written survey that you may receive in the mail after your visit with us. Thank you!             Your Updated Medication List - Protect others around you: Learn how to safely use, store and throw away your medicines at www.disposemymeds.org.          This list is accurate as of 5/16/18 10:37 AM.  Always use your most recent med list.                   Brand Name Dispense Instructions for use Diagnosis    albuterol 108 (90 Base) MCG/ACT Inhaler    PROAIR HFA/PROVENTIL HFA/VENTOLIN HFA     Inhale 2 puffs into the lungs as needed        amphetamine-dextroamphetamine 30 MG per 24 hr capsule    ADDERALL XR     Take 1 capsule by mouth as needed        Calcium-Magnesium-Zinc 333-133-5 MG Tabs per tablet      Take 1 tablet by mouth daily        cephALEXin 500 MG capsule    KEFLEX    40 capsule    Take 1 capsule (500 mg) by mouth 4 times daily    Lumbar radiculopathy       cetirizine 10 MG tablet    zyrTEC     Take 10 mg by mouth        cholecalciferol 1000 UNIT tablet    vitamin D3     Take 1,000 Units by mouth daily        CHROMIUM PO      Take 1 tablet by mouth daily        cyclobenzaprine 10 MG tablet    FLEXERIL    30 tablet    Take 1 tablet (10 mg) by mouth nightly as needed for muscle spasms    Back muscle spasm       doxycycline 100 MG capsule    VIBRAMYCIN    20 capsule    Take 1 capsule (100 mg) by mouth 2 times daily    Lumbar radiculopathy       fluticasone 50 MCG/ACT spray    FLONASE     Spray 2 sprays into both nostrils daily        metFORMIN 500 MG tablet    GLUCOPHAGE     Take 500 mg by mouth         oxyCODONE-acetaminophen 7.5-325 MG per tablet    PERCOCET    20 tablet    Take 1 tablet by mouth every 6 hours as needed for severe pain    Lumbar radiculopathy       QC WOMENS DAILY MULTIvitamin Tabs      Take 1 tablet by mouth daily        topiramate 25 MG tablet    TOPAMAX    90 tablet    Take 25 mg in AM, and 50 mg in the Evening.    Neuropathic pain       traMADol 50 MG tablet    ULTRAM    10 tablet    Take 0.5-1 tablets (25-50 mg) by mouth every 6 hours as needed for severe pain    Neuropathic pain, Lumbar radiculopathy       traZODone 50 MG tablet    DESYREL     Take 1-2 tablets by mouth daily

## 2018-05-21 ENCOUNTER — TELEPHONE (OUTPATIENT)
Dept: ANESTHESIOLOGY | Facility: CLINIC | Age: 48
End: 2018-05-21

## 2018-05-21 ENCOUNTER — OFFICE VISIT (OUTPATIENT)
Dept: ANESTHESIOLOGY | Facility: CLINIC | Age: 48
End: 2018-05-21
Payer: COMMERCIAL

## 2018-05-21 VITALS
WEIGHT: 152 LBS | SYSTOLIC BLOOD PRESSURE: 118 MMHG | DIASTOLIC BLOOD PRESSURE: 78 MMHG | BODY MASS INDEX: 24.43 KG/M2 | HEIGHT: 66 IN | RESPIRATION RATE: 16 BRPM | HEART RATE: 79 BPM

## 2018-05-21 DIAGNOSIS — M79.2 NEUROPATHIC PAIN: Primary | ICD-10-CM

## 2018-05-21 DIAGNOSIS — M96.1 POST LAMINECTOMY SYNDROME: ICD-10-CM

## 2018-05-21 DIAGNOSIS — E88.810 METABOLIC SYNDROME: ICD-10-CM

## 2018-05-21 ASSESSMENT — ANXIETY QUESTIONNAIRES
3. WORRYING TOO MUCH ABOUT DIFFERENT THINGS: NOT AT ALL
4. TROUBLE RELAXING: NOT AT ALL
7. FEELING AFRAID AS IF SOMETHING AWFUL MIGHT HAPPEN: NOT AT ALL
GAD7 TOTAL SCORE: 1
GAD7 TOTAL SCORE: 1
1. FEELING NERVOUS, ANXIOUS, OR ON EDGE: NOT AT ALL
5. BEING SO RESTLESS THAT IT IS HARD TO SIT STILL: NOT AT ALL
7. FEELING AFRAID AS IF SOMETHING AWFUL MIGHT HAPPEN: NOT AT ALL
2. NOT BEING ABLE TO STOP OR CONTROL WORRYING: NOT AT ALL
GAD7 TOTAL SCORE: 1
6. BECOMING EASILY ANNOYED OR IRRITABLE: SEVERAL DAYS

## 2018-05-21 ASSESSMENT — PAIN SCALES - GENERAL: PAINLEVEL: MILD PAIN (3)

## 2018-05-21 NOTE — LETTER
2018       RE: Phyllis Vela  [address] [facility] [address]     Dear Colleague,    Thank you for referring your patient, Phyllis Vela, to the [facility] at [facility]. Please see a copy of my visit note below.    Subjective:    47 year old female presents for post-SCS trial lead pull appointment.    She is extremely happy with the results of the trial.    She reports improved sleep, improved activity, better mood and improved daily function.    She received excellent (>75%) pain relief in the low back and the right lower extremity.     She would like to proceed with the SCS implant.    Past Medical History:   Diagnosis Date     ADD (attention deficit disorder) 2013     ADHD (attention deficit hyperactivity disorder)      Chronic back pain 2012     DDD (degenerative disc disease), lumbar 2011     Depressive disorder      Generalized anxiety disorder 2011     Head injury     concussion as a child     Hyperglycemia 2010     Hyperglyceridemia 2010     Insomnia 10/31/2013     Lumbar stenosis 2011     PONV (postoperative nausea and vomiting)     past medical history reviewed with patient.   Past Surgical History:   Procedure Laterality Date     BACK SURGERY Right 2015    Right L4/5 hemilaminectomy, discectomy     BREAST SURGERY Right 06/10/2004    breast biopsy      SECTION        SECTION        SECTION       CHOLECYSTECTOMY       INJECT SACROILIAC JOINT Right 2017    Procedure: INJECT SACROILIAC JOINT;  Right Sacroiliac Joint Injection;  Surgeon: Felix Aponte MD;  Location: [facility] OR     INSERT STIMULATOR DORSAL COLUMN TRIAL Bilateral 2018    Procedure: INSERT STIMULATOR DORSAL COLUMN TRIAL;  Insertion Bilateral Dorsal Column Stimulator (Trial)    ;  Surgeon: Felix Aponte MD;  Location: [facility] OR    past  "surgical history reviewed with patient.     Medications:    Current Outpatient Prescriptions   Medication     albuterol (PROAIR HFA/PROVENTIL HFA/VENTOLIN HFA) 108 (90 BASE) MCG/ACT Inhaler     amphetamine-dextroamphetamine (ADDERALL XR) 30 MG per 24 hr capsule     Calcium-Magnesium-Zinc 333-133-5 MG TABS per tablet     cephALEXin (KEFLEX) 500 MG capsule     cetirizine (ZYRTEC) 10 MG tablet     cholecalciferol (VITAMIN D3) 1000 UNIT tablet     CHROMIUM PO     cyclobenzaprine (FLEXERIL) 10 MG tablet     doxycycline (VIBRAMYCIN) 100 MG capsule     fluticasone (FLONASE) 50 MCG/ACT nasal spray     metFORMIN (GLUCOPHAGE) 500 MG tablet     Multiple Vitamins-Minerals (QC WOMENS DAILY MULTIVITAMIN) TABS     oxyCODONE-acetaminophen (PERCOCET) 7.5-325 MG per tablet     topiramate (TOPAMAX) 25 MG tablet     traMADol (ULTRAM) 50 MG tablet     traZODone (DESYREL) 50 MG tablet     No current facility-administered medications for this visit.        [place] and [place] Prescription Monitoring Program reviewed    Allergies:     Allergies   Allergen Reactions     Sulfa Drugs Hives     Amitriptyline      Other reaction(s): Other, see comments  Hair loss     Lyrica [Pregabalin]      Other reaction(s): Edema,generalized  Other reaction(s): Edema     Nortriptyline      Other reaction(s): Alopecia  Other reaction(s): Other, see comments  Hairloss, increased blood pressure per patient     Amoxicillin Rash     And itching      Clindamycin Rash     Airway impermeant      Gabapentin Rash       Family History:  family history is not on file.    Review Of Systems    14 point ROS negative except per HPI    Objective:     /78  Pulse 79  Resp 16  Ht 1.676 m (5' 6\")  Wt 68.9 kg (152 lb)  BMI 24.53 kg/m2  Body mass index is 24.53 kg/(m^2).    General: In no apparent distress  Mental status: Normal affect, pleasant  Head: Atraumatic, normocephalic  Eyes: Extra-ocular movements grossly intact, no scleral icterus  Cardiovascular: RRR  Respiratory: " CTAB  Abdomen: soft, non-distended  Msk: Bilateral hips, knees have normal range of motion. Pain with extension and rotation of lumbar spine  Neuro: AAOx3.   CN II-XII are grossly intact.   Strength 5/5 for bilateral shoulder abduction, elbow flexion, wrist extension, elbow extension, finger abduction, and grasp. Sensation intact to light touch throughout the C5-T1 dermatomes of the bilateral upper extremities.  Reflexes 2+/4 and symmetric for biceps, triceps, brachioradialis. Negative bocanegra's bilaterally. Neg Spurling's and L'hermitte's  Strength 5/5 for bilateral hip flexion, knee extension, dorsiflexion, EHL, and plantarflexion. Sensation intact to light touch throughout the L2-S1 dermatomes of the bilateral lower extremities. Reflexes 2+/4 and symmetric for bilateral patellar, achilles.  Negative babinski bilaterally. No clonus on ankle jerk  Skin: No rashes or lesions noted on exposed areas of skin  Lymph: no supraclavicular lymphadenopathy      Chaperone present during the entire Physical Exam: None    Imaging: Reviewed    Assessment:    Phyllis is a 47-year-old female who presents to the pain clinic as a new patient consultation. Her main complaint at today's visit his low back pain and right lower extremity pain. Of note, Phyllis is being referred to the pain clinic by Dr. Felix Floyd in Neurosurgery. She underwent right-sided L4-L5 hemilaminectomy in 2015, which was not helpful for her pain symptomatology. Based upon history, physical exam, review of the medical record, laboratory studies and radiographs, the most likely diagnoses are neuropathic pain, myofascial pain syndrome, deconditioning, sacroiliitis, postlaminectomy syndrome, tobacco abuse and nociceptive pain. At today's visit, I am seeing her for her post spinal stimulator trial lead pull appointment. She received excellent pain relief of her low back and right lower extremity pain symptoms. She reports improved sleep, improved activity and improved  mood over the duration of the trial. She was very happy with the spinal cord stim trial. Therefore, I will proceed with spinal cord stim implant with Jolynn SHETTY (Mikel).    Plan:   1. Proceed with SCS Implant with Reginaldo - LEFT lead placement bottom T6, right lead placement top T7.  2. Lead pull at today's visit.  3. IPG on the left side.  4. Standard pre-operative labs      Again, thank you for allowing me to participate in the care of your patient.      Sincerely,    Felix Aponte MD

## 2018-05-21 NOTE — PATIENT INSTRUCTIONS
1. Schedule surgery.     2. Pre-operative requirements:    A. Pre-op physical with PAC clinic 2 to 4 weeks before your surgery.  Please call [phone] to make an appointment.     B. Visit the lab for blood work 2 to 4 weeks before your surgery.     C. Start using hibiclens body scrub daily to wash back and buttocks daily for 5 days before and the morning of your surgery.     D. Wear a pair of pants with a belt on the day of surgery to assistance with comfortable placement of the generator.     Follow up: 1 week and 4 weeks post operative.       When calling to schedule your procedure appointment, also make your clinic appointment to follow up 1 week and 4 weeks after the procedure.    Please call [phone] to schedule, reschedule, or cancel your procedure appointment.   Phones are answered Monday - Friday from 7:30 - 4:00pm.  Leave a voicemail with your name, birth date, and phone number if no one is available to take your call.     Your procedure: Spinal cord stimulator implant     On the day of the procedure  1. Arrive 1 hour earlier than your scheduled time, to the [facility]  Address: [address] [place] [address]  2. Check in on the 5th floor for your procedure    Our phone number is [phone].  Our fax number is [phone].    If you must reschedule your procedure more than two times, you must follow up in clinic before rescheduling again.      Patient Pre-Procedure Instructions    CAUTION - FAILURE TO FOLLOW THESE PRE-PROCEDURE INSTRUCTIONS WILL RESULT IN YOUR PROCEDURE BEING RESCHEDULED.      Pregnancy  If you are pregnant, or think that you may be pregnant, please notify our staff. This may or may not affect the ability to perform the procedure.     You MUST have a  TO TAKE YOU HOME after your procedure. Transportation by Taxi or Para-transit must have a responsible adult accompany you home (other than the ). Travel by bus or light rail is not  acceptable transportation. You must provide your 's full name and contact number at time of check in.   Fasting Protocol You may have NOTHING SOLID TO EAT FOR 8 HOURS prior to arrival at the procedure area.   Broth and candy are considered solid food and require an eight hour fast.   You may have CLEAR LIQUIDS UP TO 2 HOURS prior to arrival at the clinic.   Clear liquids include water, clear fruit juice (no pulp) carbonated beverages, ice, black coffee, black tea (no milk or cream), chewing gum (un-swallowed), and/or clear jello (no fruit or milk). No alcohol containing beverages.   Medications If you take any medications,  DO NOT STOP. Take your medications as usual the morning of your procedure with a sip of water AT LEAST 2 HOURS PRIOR TO ARRIVAL.    Antibiotics If you are currently taking antibiotics, you must complete the entire dose 7 days prior to your scheduled procedure. You must be clear of any signs or symptoms of infection. If you begin antibiotics, please contact our clinic for instructions.   Fever, Chills, or Rash If you experience a fever of higher than 100 degrees, chills, rash, or open wounds during the one week prior to your procedure, please call the clinic.         Medication Hold List  **Patients under Cardiology/Neurology care should consult their provider prior to the pain procedure to verify pre-procedure medication instructions. The information below contains general guidelines.**    Blood Thinners If you are taking daily ASPIRIN, PLAVIX, OR OTHER BLOOD THINNERS SUCH AS COUMADIN/WARFARIN, we will need your prescribing doctor to sign a release permitting you to stop these medications. Once approved by your prescribing doctor - STOP ALL BLOOD THINNERS BASED ON THE TIME TABLE BELOW PRIOR TO YOUR PROCEDURE. If you have been instructed to stop WARFARIN(COUMADIN), you must have an INR lab drawn the day before your procedure. . Your INR must be within normal limits before we can perform your  injection. MEDICATIONS CAN BE RESTARTED AFTER YOUR PROCEDURE.    14 DAY HOLD  Ticlid (ticlopidine)    10 DAY HOLD  Effient (Prasugel)    3 DAY HOLD  Xarelto (rivaroxaban) 7 DAY HOLD  Anacin, Bufferin, Ecotrin, Excedrin, Aggrenox (Aspirin)  Brilinta (ticagrelor)  Coumadin (Warfarin)  Pradexa (Dabigatran)  Elmiron (Pentosan)  Plavix (Clopidogrel Bisulfate)  Pletal (Cilostazol)    24 HOUR HOLD  Lovenox (enoxaparin)  Agrylin (Anagrelide)        Non-steroidal Anti-inflammatories (NSAIDs) DO NOT TAKE any non-steroidal anti-inflammatory medications (NSAIDs) listed on the table below. MEDICATIONS CAN BE RESTARTED AFTER YOUR PROCEDURE. Celebrex is OK to take and does not need to be discontinued.     Medications to stop:  3 DAY HOLD  Advil, Motrin (Ibuprofen)  Arthrotec (diciofenac sodium/misoprostol)  Clinoril (Sulindac)  Indocin (Indomethacin)  Lodine (Etodolac)  Toradol (Ketorolac)  Vicoprofen (Hydrocodone and Ibuprofen)  Voltaren (Diclotenac)    14 DAY HOLD  Daypro (Oxaprozin)  Feldene (Piroxicam)   7 DAY HOLD  Aleve (Naproxen sodium)  Darvon compound (contains aspirin)  Naprosyn (Naproxen)  Norgesic Forte (contains aspirin)  Mobic (Meloxicam)  Oruvall (Ketoprofen)  Percodan (contains aspirin)  Relafen (Nabumetone)  Salsalate  Trilisate  Vitamin E (more than 400 mg per day)  Any medication containing aspirin                To speak with a nurse, schedule/reschedule/cancel a clinic appointment, or request a medication refill call: [phone]     You can also reach us by [employer]: [email or web address]

## 2018-05-21 NOTE — MR AVS SNAPSHOT
After Visit Summary   5/21/2018    Phyllis Vela    MRN: [id number]           Patient Information     Date Of Birth          1970        Visit Information        Provider Department      5/21/2018 7:20 AM Felix Aponte MD [facility]        Today's Diagnoses     Neuropathic pain    -  1    Post laminectomy syndrome          Care Instructions    1. Schedule surgery.     2. Pre-operative requirements:    A. Pre-op physical with PAC clinic 2 to 4 weeks before your surgery.  Please call [phone] to make an appointment.     B. Visit the lab for blood work 2 to 4 weeks before your surgery.     C. Start using hibiclens body scrub daily to wash back and buttocks daily for 5 days before and the morning of your surgery.     D. Wear a pair of pants with a belt on the day of surgery to assistance with comfortable placement of the generator.     Follow up: 1 week and 4 weeks post operative.       When calling to schedule your procedure appointment, also make your clinic appointment to follow up 1 week and 4 weeks after the procedure.    Please call [phone] to schedule, reschedule, or cancel your procedure appointment.   Phones are answered Monday - Friday from 7:30 - 4:00pm.  Leave a voicemail with your name, birth date, and phone number if no one is available to take your call.     Your procedure: Spinal cord stimulator implant     On the day of the procedure  1. Arrive 1 hour earlier than your scheduled time, to the [facility]  Address: [address] [place] [address]  2. Check in on the 5th floor for your procedure    Our phone number is [phone].  Our fax number is [phone].    If you must reschedule your procedure more than two times, you must follow up in clinic before rescheduling again.      Patient Pre-Procedure Instructions    CAUTION - FAILURE TO FOLLOW THESE PRE-PROCEDURE INSTRUCTIONS WILL RESULT IN  YOUR PROCEDURE BEING RESCHEDULED.      Pregnancy  If you are pregnant, or think that you may be pregnant, please notify our staff. This may or may not affect the ability to perform the procedure.     You MUST have a  TO TAKE YOU HOME after your procedure. Transportation by Taxi or Para-transit must have a responsible adult accompany you home (other than the ). Travel by bus or light rail is not acceptable transportation. You must provide your 's full name and contact number at time of check in.   Fasting Protocol You may have NOTHING SOLID TO EAT FOR 8 HOURS prior to arrival at the procedure area.   Broth and candy are considered solid food and require an eight hour fast.   You may have CLEAR LIQUIDS UP TO 2 HOURS prior to arrival at the clinic.   Clear liquids include water, clear fruit juice (no pulp) carbonated beverages, ice, black coffee, black tea (no milk or cream), chewing gum (un-swallowed), and/or clear jello (no fruit or milk). No alcohol containing beverages.   Medications If you take any medications,  DO NOT STOP. Take your medications as usual the morning of your procedure with a sip of water AT LEAST 2 HOURS PRIOR TO ARRIVAL.    Antibiotics If you are currently taking antibiotics, you must complete the entire dose 7 days prior to your scheduled procedure. You must be clear of any signs or symptoms of infection. If you begin antibiotics, please contact our clinic for instructions.   Fever, Chills, or Rash If you experience a fever of higher than 100 degrees, chills, rash, or open wounds during the one week prior to your procedure, please call the clinic.         Medication Hold List  **Patients under Cardiology/Neurology care should consult their provider prior to the pain procedure to verify pre-procedure medication instructions. The information below contains general guidelines.**    Blood Thinners If you are taking daily ASPIRIN, PLAVIX, OR OTHER BLOOD THINNERS SUCH  AS COUMADIN/WARFARIN, we will need your prescribing doctor to sign a release permitting you to stop these medications. Once approved by your prescribing doctor - STOP ALL BLOOD THINNERS BASED ON THE TIME TABLE BELOW PRIOR TO YOUR PROCEDURE. If you have been instructed to stop WARFARIN(COUMADIN), you must have an INR lab drawn the day before your procedure. . Your INR must be within normal limits before we can perform your injection. MEDICATIONS CAN BE RESTARTED AFTER YOUR PROCEDURE.    14 DAY HOLD  Ticlid (ticlopidine)    10 DAY HOLD  Effient (Prasugel)    3 DAY HOLD  Xarelto (rivaroxaban) 7 DAY HOLD  Anacin, Bufferin, Ecotrin, Excedrin, Aggrenox (Aspirin)  Brilinta (ticagrelor)  Coumadin (Warfarin)  Pradexa (Dabigatran)  Elmiron (Pentosan)  Plavix (Clopidogrel Bisulfate)  Pletal (Cilostazol)    24 HOUR HOLD  Lovenox (enoxaparin)  Agrylin (Anagrelide)        Non-steroidal Anti-inflammatories (NSAIDs) DO NOT TAKE any non-steroidal anti-inflammatory medications (NSAIDs) listed on the table below. MEDICATIONS CAN BE RESTARTED AFTER YOUR PROCEDURE. Celebrex is OK to take and does not need to be discontinued.     Medications to stop:  3 DAY HOLD  Advil, Motrin (Ibuprofen)  Arthrotec (diciofenac sodium/misoprostol)  Clinoril (Sulindac)  Indocin (Indomethacin)  Lodine (Etodolac)  Toradol (Ketorolac)  Vicoprofen (Hydrocodone and Ibuprofen)  Voltaren (Diclotenac)    14 DAY HOLD  Daypro (Oxaprozin)  Feldene (Piroxicam)   7 DAY HOLD  Aleve (Naproxen sodium)  Darvon compound (contains aspirin)  Naprosyn (Naproxen)  Norgesic Forte (contains aspirin)  Mobic (Meloxicam)  Oruvall (Ketoprofen)  Percodan (contains aspirin)  Relafen (Nabumetone)  Salsalate  Trilisate  Vitamin E (more than 400 mg per day)  Any medication containing aspirin                To speak with a nurse, schedule/reschedule/cancel a clinic appointment, or request a medication refill call: [phone]     You can also reach us by [employer]:  "[email or web address]            Follow-ups after your visit        Future tests that were ordered for you today     Open Future Orders        Priority Expected Expires Ordered    UA with Microscopic Routine  5/22/2019 5/21/2018    INR Routine  5/21/2019 5/21/2018    CBC with platelets differential Routine  5/21/2019 5/21/2018    Basic metabolic panel Routine  5/21/2019 5/21/2018            Who to contact     Please call your clinic at [phone] to:    Ask questions about your health    Make or cancel appointments    Discuss your medicines    Learn about your test results    Speak to your doctor            Additional Information About Your Visit        [employer] Information     [employer] gives you secure access to your electronic health record. If you see a primary care provider, you can also send messages to your care team and make appointments. If you have questions, please call your primary care clinic.  If you do not have a primary care provider, please call [phone] and they will assist you.      [employer] is an electronic gateway that provides easy, online access to your medical records. With [employer], you can request a clinic appointment, read your test results, renew a prescription or communicate with your care team.     To access your existing account, please contact your [facility] or call [phone] for assistance.        Care EveryWhere ID     This is your Care EveryWhere ID. This could be used by other organizations to access your [facility] medical records  [id number]        Your Vitals Were     Pulse Respirations Height BMI (Body Mass Index)          79 16 1.676 m (5' 6\") 24.53 kg/m2         Blood Pressure from Last 3 Encounters:   05/21/18 118/78   05/16/18 (!) 138/93   05/11/18 124/82    Weight from Last 3 Encounters:   05/21/18 68.9 kg (152 lb)   05/16/18 68.5 kg (151 lb)   05/11/18 68.1 kg (150 lb 2.1 oz)              We Performed the Following     " [facility]ireya-Operative Worksheet - Spinal Cord Stimulator Implant        Primary Care Provider Office Phone # Fax #    Sonya BELEN Beasley PA-C [phone]       [facility] [address]  [facility] [address]        Equal Access to Services     ОЛЕГ TARANGO : Hadii aad ku hadasho Soomaali, waaxda luqadaha, qaybta kaalmada adeegyada, waxay idiin hayaan adeeg carolyn laearl pearl. So [facility] [phone].    ATENCIÓN: Si habla español, tiene a watson disposición servicios gratuitos de asistencia lingüística. Llame al [phone].    We comply with applicable federal civil rights laws and [place] laws. We do not discriminate on the basis of race, color, national origin, age, disability, sex, sexual orientation, or gender identity.            Thank you!     Thank you for choosing [facility]  for your care. Our goal is always to provide you with excellent care. Hearing back from our patients is one way we can continue to improve our services. Please take a few minutes to complete the written survey that you may receive in the mail after your visit with us. Thank you!             Your Updated Medication List - Protect others around you: Learn how to safely use, store and throw away your medicines at www.disposemymeds.org.          This list is accurate as of 5/21/18  8:24 AM.  Always use your most recent med list.                   Brand Name Dispense Instructions for use Diagnosis    albuterol 108 (90 Base) MCG/ACT Inhaler    PROAIR HFA/PROVENTIL HFA/VENTOLIN HFA     Inhale 2 puffs into the lungs as needed        amphetamine-dextroamphetamine 30 MG per 24 hr capsule    ADDERALL XR     Take 1 capsule by mouth as needed        Calcium-Magnesium-Zinc 333-133-5 MG Tabs per tablet      Take 1 tablet by mouth daily        cephALEXin 500 MG capsule    KEFLEX    40 capsule    Take 1 capsule (500 mg) by mouth 4 times daily    Lumbar radiculopathy       cetirizine 10 MG tablet     zyrTEC     Take 10 mg by mouth        cholecalciferol 1000 UNIT tablet    vitamin D3     Take 1,000 Units by mouth daily        CHROMIUM PO      Take 1 tablet by mouth daily        cyclobenzaprine 10 MG tablet    FLEXERIL    30 tablet    Take 1 tablet (10 mg) by mouth nightly as needed for muscle spasms    Back muscle spasm       doxycycline 100 MG capsule    VIBRAMYCIN    20 capsule    Take 1 capsule (100 mg) by mouth 2 times daily    Lumbar radiculopathy       fluticasone 50 MCG/ACT spray    FLONASE     Spray 2 sprays into both nostrils daily        metFORMIN 500 MG tablet    GLUCOPHAGE     Take 500 mg by mouth        oxyCODONE-acetaminophen 7.5-325 MG per tablet    PERCOCET    20 tablet    Take 1 tablet by mouth every 6 hours as needed for severe pain    Lumbar radiculopathy       QC WOMENS DAILY MULTIvitamin Tabs      Take 1 tablet by mouth daily        topiramate 25 MG tablet    TOPAMAX    90 tablet    Take 25 mg in AM, and 50 mg in the Evening.    Neuropathic pain       traMADol 50 MG tablet    ULTRAM    10 tablet    Take 0.5-1 tablets (25-50 mg) by mouth every 6 hours as needed for severe pain    Neuropathic pain, Lumbar radiculopathy       traZODone 50 MG tablet    DESYREL     Take 1-2 tablets by mouth daily

## 2018-05-21 NOTE — PROGRESS NOTES
Subjective:    47 year old female presents for post-SCS trial lead pull appointment.    She is extremely happy with the results of the trial.    She reports improved sleep, improved activity, better mood and improved daily function.    She received excellent (>75%) pain relief in the low back and the right lower extremity.     She would like to proceed with the SCS implant.    Past Medical History:   Diagnosis Date     ADD (attention deficit disorder) 2013     ADHD (attention deficit hyperactivity disorder)      Chronic back pain 2012     DDD (degenerative disc disease), lumbar 2011     Depressive disorder      Generalized anxiety disorder 2011     Head injury     concussion as a child     Hyperglycemia 2010     Hyperglyceridemia 2010     Insomnia 10/31/2013     Lumbar stenosis 2011     PONV (postoperative nausea and vomiting)     past medical history reviewed with patient.   Past Surgical History:   Procedure Laterality Date     BACK SURGERY Right 2015    Right L4/5 hemilaminectomy, discectomy     BREAST SURGERY Right 06/10/2004    breast biopsy      SECTION        SECTION        SECTION       CHOLECYSTECTOMY       INJECT SACROILIAC JOINT Right 2017    Procedure: INJECT SACROILIAC JOINT;  Right Sacroiliac Joint Injection;  Surgeon: Felix Aponte MD;  Location: [facility] OR     INSERT STIMULATOR DORSAL COLUMN TRIAL Bilateral 2018    Procedure: INSERT STIMULATOR DORSAL COLUMN TRIAL;  Insertion Bilateral Dorsal Column Stimulator (Trial)    ;  Surgeon: Felix Aponte MD;  Location: [facility] OR    past surgical history reviewed with patient.     Medications:    Current Outpatient Prescriptions   Medication     albuterol (PROAIR HFA/PROVENTIL HFA/VENTOLIN HFA) 108 (90 BASE) MCG/ACT Inhaler     amphetamine-dextroamphetamine (ADDERALL XR) 30 MG per 24 hr capsule     Calcium-Magnesium-Zinc 333-133-5 MG TABS  "per tablet     cephALEXin (KEFLEX) 500 MG capsule     cetirizine (ZYRTEC) 10 MG tablet     cholecalciferol (VITAMIN D3) 1000 UNIT tablet     CHROMIUM PO     cyclobenzaprine (FLEXERIL) 10 MG tablet     doxycycline (VIBRAMYCIN) 100 MG capsule     fluticasone (FLONASE) 50 MCG/ACT nasal spray     metFORMIN (GLUCOPHAGE) 500 MG tablet     Multiple Vitamins-Minerals (QC WOMENS DAILY MULTIVITAMIN) TABS     oxyCODONE-acetaminophen (PERCOCET) 7.5-325 MG per tablet     topiramate (TOPAMAX) 25 MG tablet     traMADol (ULTRAM) 50 MG tablet     traZODone (DESYREL) 50 MG tablet     No current facility-administered medications for this visit.        [place] and [place] Prescription Monitoring Program reviewed    Allergies:     Allergies   Allergen Reactions     Sulfa Drugs Hives     Amitriptyline      Other reaction(s): Other, see comments  Hair loss     Lyrica [Pregabalin]      Other reaction(s): Edema,generalized  Other reaction(s): Edema     Nortriptyline      Other reaction(s): Alopecia  Other reaction(s): Other, see comments  Hairloss, increased blood pressure per patient     Amoxicillin Rash     And itching      Clindamycin Rash     Airway impermeant      Gabapentin Rash       Family History:  family history is not on file.    Review Of Systems    14 point ROS negative except per HPI    Objective:     /78  Pulse 79  Resp 16  Ht 1.676 m (5' 6\")  Wt 68.9 kg (152 lb)  BMI 24.53 kg/m2  Body mass index is 24.53 kg/(m^2).    General: In no apparent distress  Mental status: Normal affect, pleasant  Head: Atraumatic, normocephalic  Eyes: Extra-ocular movements grossly intact, no scleral icterus  Cardiovascular: RRR  Respiratory: CTAB  Abdomen: soft, non-distended  Msk: Bilateral hips, knees have normal range of motion. Pain with extension and rotation of lumbar spine  Neuro: AAOx3.   CN II-XII are grossly intact.   Strength 5/5 for bilateral shoulder abduction, elbow flexion, wrist extension, elbow extension, finger abduction, and " grasp. Sensation intact to light touch throughout the C5-T1 dermatomes of the bilateral upper extremities.  Reflexes 2+/4 and symmetric for biceps, triceps, brachioradialis. Negative bocanegra's bilaterally. Neg Spurling's and L'hermitte's  Strength 5/5 for bilateral hip flexion, knee extension, dorsiflexion, EHL, and plantarflexion. Sensation intact to light touch throughout the L2-S1 dermatomes of the bilateral lower extremities. Reflexes 2+/4 and symmetric for bilateral patellar, achilles.  Negative babinski bilaterally. No clonus on ankle jerk  Skin: No rashes or lesions noted on exposed areas of skin  Lymph: no supraclavicular lymphadenopathy      Chaperone present during the entire Physical Exam: None    Imaging: Reviewed    Assessment:    Phyllis is a 47-year-old female who presents to the pain clinic as a new patient consultation. Her main complaint at today's visit his low back pain and right lower extremity pain. Of note, Phyllis is being referred to the pain clinic by Dr. Felix Floyd in Neurosurgery. She underwent right-sided L4-L5 hemilaminectomy in 2015, which was not helpful for her pain symptomatology. Based upon history, physical exam, review of the medical record, laboratory studies and radiographs, the most likely diagnoses are neuropathic pain, myofascial pain syndrome, deconditioning, sacroiliitis, postlaminectomy syndrome, tobacco abuse and nociceptive pain. At today's visit, I am seeing her for her post spinal stimulator trial lead pull appointment. She received excellent pain relief of her low back and right lower extremity pain symptoms. She reports improved sleep, improved activity and improved mood over the duration of the trial. She was very happy with the spinal cord stim trial. Therefore, I will proceed with spinal cord stim implant with Jolynn SHETTY (Mikel).    Plan:     1. Proceed with SCS Implant with Reginaldo - LEFT lead placement bottom T6, right lead placement top T7.  2. Lead pull at  today's visit.  3. IPG on the left side.  4. Standard pre-operative labs

## 2018-05-21 NOTE — NURSING NOTE
AVS reviewed with pt and given copy.  Pre-procedure instructions reviewed, including NPO orders,  needed, and medications to hold.  Pt verbalized understanding and declined having questions at this time.     Adrianna Florence, RN, BSN

## 2018-05-21 NOTE — TELEPHONE ENCOUNTER
Patient called requesting to schedule her spinal cord implant with Dr. Aponte. Patient can be reached at [phone].

## 2018-05-21 NOTE — TELEPHONE ENCOUNTER
Phyllis has been scheduled for 7/2/2018. She was not able to come on 6/6 as she is moving that week.     She has been added to the cancellation list.     She will have a  for the procedure. She will see her PCP for her pre-op. Both of her post ops have been made.

## 2018-05-22 ASSESSMENT — ANXIETY QUESTIONNAIRES: GAD7 TOTAL SCORE: 1

## 2018-05-22 NOTE — TELEPHONE ENCOUNTER
Phyllis called back today and left a message to see if there is still an opening for the first week in June. She acknowledged that she is currently scheduled for 7/2/18, but just wanted to check the June availability again before she makes all of her arrangements.

## 2018-05-22 NOTE — TELEPHONE ENCOUNTER
I called to let Phyllis know that 6/4 is no longer available. We discussed the possibility of 6/6. We are looking at options and she will get back to me if that day would even work for her.

## 2018-05-23 NOTE — TELEPHONE ENCOUNTER
I called Phyllis to let her know that June 6th wont work anymore. We need more time for the PA to go through. Phyllis is ok with keeping the 7/2 date.

## 2018-06-29 ENCOUNTER — ANESTHESIA EVENT (OUTPATIENT)
Dept: SURGERY | Facility: AMBULATORY SURGERY CENTER | Age: 48
End: 2018-06-29

## 2018-07-02 ENCOUNTER — HOSPITAL ENCOUNTER (OUTPATIENT)
Facility: AMBULATORY SURGERY CENTER | Age: 48
End: 2018-07-02
Attending: ANESTHESIOLOGY
Payer: COMMERCIAL

## 2018-07-02 ENCOUNTER — ANESTHESIA (OUTPATIENT)
Dept: SURGERY | Facility: AMBULATORY SURGERY CENTER | Age: 48
End: 2018-07-02

## 2018-07-02 ENCOUNTER — SURGERY (OUTPATIENT)
Age: 48
End: 2018-07-02

## 2018-07-02 ENCOUNTER — RADIANT APPOINTMENT (OUTPATIENT)
Dept: RADIOLOGY | Facility: AMBULATORY SURGERY CENTER | Age: 48
End: 2018-07-02
Attending: ANESTHESIOLOGY
Payer: COMMERCIAL

## 2018-07-02 VITALS
HEIGHT: 67 IN | BODY MASS INDEX: 22.91 KG/M2 | TEMPERATURE: 98.2 F | DIASTOLIC BLOOD PRESSURE: 88 MMHG | RESPIRATION RATE: 16 BRPM | WEIGHT: 146 LBS | SYSTOLIC BLOOD PRESSURE: 125 MMHG | OXYGEN SATURATION: 99 % | HEART RATE: 73 BPM

## 2018-07-02 DIAGNOSIS — M96.1 POST LAMINECTOMY SYNDROME: Primary | ICD-10-CM

## 2018-07-02 DIAGNOSIS — R52 PAIN: ICD-10-CM

## 2018-07-02 LAB
GLUCOSE BLDC GLUCOMTR-MCNC: 103 MG/DL (ref 70–99)
HCG UR QL: NEGATIVE
INTERNAL QC OK POCT: YES

## 2018-07-02 DEVICE — IMPLANTABLE DEVICE: Type: IMPLANTABLE DEVICE | Status: FUNCTIONAL

## 2018-07-02 RX ORDER — SODIUM CHLORIDE, SODIUM LACTATE, POTASSIUM CHLORIDE, CALCIUM CHLORIDE 600; 310; 30; 20 MG/100ML; MG/100ML; MG/100ML; MG/100ML
INJECTION, SOLUTION INTRAVENOUS CONTINUOUS
Status: DISCONTINUED | OUTPATIENT
Start: 2018-07-02 | End: 2018-07-03 | Stop reason: HOSPADM

## 2018-07-02 RX ORDER — BUPIVACAINE HYDROCHLORIDE 5 MG/ML
INJECTION, SOLUTION PERINEURAL PRN
Status: DISCONTINUED | OUTPATIENT
Start: 2018-07-02 | End: 2018-07-02 | Stop reason: HOSPADM

## 2018-07-02 RX ORDER — ONDANSETRON 4 MG/1
4 TABLET, ORALLY DISINTEGRATING ORAL EVERY 30 MIN PRN
Status: DISCONTINUED | OUTPATIENT
Start: 2018-07-02 | End: 2018-07-03 | Stop reason: HOSPADM

## 2018-07-02 RX ORDER — LIDOCAINE HYDROCHLORIDE 20 MG/ML
INJECTION, SOLUTION INFILTRATION; PERINEURAL PRN
Status: DISCONTINUED | OUTPATIENT
Start: 2018-07-02 | End: 2018-07-02

## 2018-07-02 RX ORDER — DIPHENHYDRAMINE HYDROCHLORIDE 50 MG/ML
INJECTION INTRAMUSCULAR; INTRAVENOUS PRN
Status: DISCONTINUED | OUTPATIENT
Start: 2018-07-02 | End: 2018-07-02

## 2018-07-02 RX ORDER — FENTANYL CITRATE 50 UG/ML
INJECTION, SOLUTION INTRAMUSCULAR; INTRAVENOUS PRN
Status: DISCONTINUED | OUTPATIENT
Start: 2018-07-02 | End: 2018-07-02

## 2018-07-02 RX ORDER — NALOXONE HYDROCHLORIDE 0.4 MG/ML
.1-.4 INJECTION, SOLUTION INTRAMUSCULAR; INTRAVENOUS; SUBCUTANEOUS
Status: DISCONTINUED | OUTPATIENT
Start: 2018-07-02 | End: 2018-07-03 | Stop reason: HOSPADM

## 2018-07-02 RX ORDER — DEXAMETHASONE SODIUM PHOSPHATE 4 MG/ML
INJECTION, SOLUTION INTRA-ARTICULAR; INTRALESIONAL; INTRAMUSCULAR; INTRAVENOUS; SOFT TISSUE PRN
Status: DISCONTINUED | OUTPATIENT
Start: 2018-07-02 | End: 2018-07-02

## 2018-07-02 RX ORDER — LIDOCAINE 40 MG/G
CREAM TOPICAL
Status: DISCONTINUED | OUTPATIENT
Start: 2018-07-02 | End: 2018-07-03 | Stop reason: HOSPADM

## 2018-07-02 RX ORDER — CEFAZOLIN SODIUM 1 G/3ML
INJECTION, POWDER, FOR SOLUTION INTRAMUSCULAR; INTRAVENOUS PRN
Status: DISCONTINUED | OUTPATIENT
Start: 2018-07-02 | End: 2018-07-02

## 2018-07-02 RX ORDER — PROPOFOL 10 MG/ML
INJECTION, EMULSION INTRAVENOUS CONTINUOUS PRN
Status: DISCONTINUED | OUTPATIENT
Start: 2018-07-02 | End: 2018-07-02

## 2018-07-02 RX ORDER — GLYCOPYRROLATE 0.2 MG/ML
INJECTION, SOLUTION INTRAMUSCULAR; INTRAVENOUS PRN
Status: DISCONTINUED | OUTPATIENT
Start: 2018-07-02 | End: 2018-07-02

## 2018-07-02 RX ORDER — ONDANSETRON 2 MG/ML
INJECTION INTRAMUSCULAR; INTRAVENOUS PRN
Status: DISCONTINUED | OUTPATIENT
Start: 2018-07-02 | End: 2018-07-02

## 2018-07-02 RX ORDER — ONDANSETRON 2 MG/ML
4 INJECTION INTRAMUSCULAR; INTRAVENOUS EVERY 30 MIN PRN
Status: DISCONTINUED | OUTPATIENT
Start: 2018-07-02 | End: 2018-07-03 | Stop reason: HOSPADM

## 2018-07-02 RX ORDER — ACETAMINOPHEN 325 MG/1
975 TABLET ORAL ONCE
Status: COMPLETED | OUTPATIENT
Start: 2018-07-02 | End: 2018-07-02

## 2018-07-02 RX ORDER — PROPOFOL 10 MG/ML
INJECTION, EMULSION INTRAVENOUS PRN
Status: DISCONTINUED | OUTPATIENT
Start: 2018-07-02 | End: 2018-07-02

## 2018-07-02 RX ORDER — OXYCODONE AND ACETAMINOPHEN 10; 325 MG/1; MG/1
1 TABLET ORAL EVERY 6 HOURS PRN
Qty: 28 TABLET | Refills: 0 | Status: SHIPPED | OUTPATIENT
Start: 2018-07-02 | End: 2018-07-18

## 2018-07-02 RX ORDER — DOXYCYCLINE 100 MG/1
100 CAPSULE ORAL 2 TIMES DAILY
Qty: 28 CAPSULE | Refills: 0 | Status: SHIPPED | OUTPATIENT
Start: 2018-07-02 | End: 2018-08-28

## 2018-07-02 RX ADMIN — DEXAMETHASONE SODIUM PHOSPHATE 4 MG: 4 INJECTION, SOLUTION INTRA-ARTICULAR; INTRALESIONAL; INTRAMUSCULAR; INTRAVENOUS; SOFT TISSUE at 07:50

## 2018-07-02 RX ADMIN — CEFAZOLIN SODIUM 0.5 G: 1 INJECTION, POWDER, FOR SOLUTION INTRAMUSCULAR; INTRAVENOUS at 10:56

## 2018-07-02 RX ADMIN — SODIUM CHLORIDE, SODIUM LACTATE, POTASSIUM CHLORIDE, CALCIUM CHLORIDE: 600; 310; 30; 20 INJECTION, SOLUTION INTRAVENOUS at 06:27

## 2018-07-02 RX ADMIN — LIDOCAINE HYDROCHLORIDE 100 MG: 20 INJECTION, SOLUTION INFILTRATION; PERINEURAL at 08:00

## 2018-07-02 RX ADMIN — GLYCOPYRROLATE 0.1 MG: 0.2 INJECTION, SOLUTION INTRAMUSCULAR; INTRAVENOUS at 07:35

## 2018-07-02 RX ADMIN — FENTANYL CITRATE 25 MCG: 50 INJECTION, SOLUTION INTRAMUSCULAR; INTRAVENOUS at 07:35

## 2018-07-02 RX ADMIN — PROPOFOL 75 MCG/KG/MIN: 10 INJECTION, EMULSION INTRAVENOUS at 07:40

## 2018-07-02 RX ADMIN — FENTANYL CITRATE 25 MCG: 50 INJECTION, SOLUTION INTRAMUSCULAR; INTRAVENOUS at 08:19

## 2018-07-02 RX ADMIN — DIPHENHYDRAMINE HYDROCHLORIDE 25 MG: 50 INJECTION INTRAMUSCULAR; INTRAVENOUS at 10:20

## 2018-07-02 RX ADMIN — ACETAMINOPHEN 975 MG: 325 TABLET ORAL at 06:27

## 2018-07-02 RX ADMIN — FENTANYL CITRATE 25 MCG: 50 INJECTION, SOLUTION INTRAMUSCULAR; INTRAVENOUS at 10:31

## 2018-07-02 RX ADMIN — PROPOFOL 20 MG: 10 INJECTION, EMULSION INTRAVENOUS at 11:05

## 2018-07-02 RX ADMIN — ONDANSETRON 4 MG: 2 INJECTION INTRAMUSCULAR; INTRAVENOUS at 10:02

## 2018-07-02 RX ADMIN — CEFAZOLIN SODIUM 0.5 G: 1 INJECTION, POWDER, FOR SOLUTION INTRAMUSCULAR; INTRAVENOUS at 10:22

## 2018-07-02 RX ADMIN — BUPIVACAINE HYDROCHLORIDE 26 ML: 5 INJECTION, SOLUTION PERINEURAL at 08:19

## 2018-07-02 NOTE — ANESTHESIA PREPROCEDURE EVALUATION
Anesthesia Evaluation     . Pt has had prior anesthetic. Type of anesthetic: No issues with MAC.    History of anesthetic complications   - PONV        ROS/MED HX    ENT/Pulmonary:  - neg pulmonary ROS     Neurologic:     (+)neuropathy other neuro S/P backsurgery and Radiculopathy    Cardiovascular:  - neg cardiovascular ROS       METS/Exercise Tolerance:  >4 METS   Hematologic:  - neg hematologic  ROS       Musculoskeletal:  - neg musculoskeletal ROS       GI/Hepatic:  - neg GI/hepatic ROS       Renal/Genitourinary:  - ROS Renal section negative       Endo:     (+) type II DM Not using insulin .      Psychiatric:     (+) psychiatric history anxiety (ADHD)      Infectious Disease:  - neg infectious disease ROS       Malignancy:      - no malignancy   Other:    - neg other ROS                 Physical Exam  Normal systems: cardiovascular, pulmonary and dental    Airway   Mallampati: I  TM distance: >3 FB  Neck ROM: full    Dental     Cardiovascular       Pulmonary                     Anesthesia Plan      History & Physical Review  History and physical reviewed and following examination; no interval change.    ASA Status:  2 .    NPO Status:  > 6 hours    Plan for MAC with Intravenous and Propofol induction. Maintenance will be TIVA.  Reason for MAC:  Deep or markedly invasive procedure (G8)  PONV prophylaxis:  Ondansetron (or other 5HT-3) and Dexamethasone or Solumedrol       Postoperative Care  Postoperative pain management:  IV analgesics, Oral pain medications and Multi-modal analgesia.      Consents  Anesthetic plan, risks, benefits and alternatives discussed with:  Patient..                  History and physical assessed; Patient examined.   Risks and alternatives presented and discussed. Patient agrees. All questions answered.      Zen Monroe MD  Staff Anesthesiologist  *[phone]

## 2018-07-02 NOTE — DISCHARGE INSTRUCTIONS
PAIN MANAGEMENT PATIENT  DISCHARGE INSTRUCTIONS FOR PATIENTS WITH SPINAL CORD STIMULATORS    Activities:    Rest for the first 24 hours.    Raise the head of your bed about 20 degrees for the firt post-operative night to stabilize your spine.  The Day After Surgery    Walk for brief periods of time keeping your back as straight as possible to prevent lead movement.    No swimming/hot tubs/lakes for 45 days after implant.    WEAR ABDOMINAL BINDER CONTINUOUSLY 24 HOURS PER DAY UNTIL YOUR FIRST POST-OPERATIVE APPOINTMENT    Diet:    Start with clear liquids or a light meal.  If you do not become nauseated, gradually progress to a normal diet.    Drink 8-6 oz. glasses of water a day.    NO ALCOHOLIC BEVERAGES.    No smoking, this will delay healing.    Incision Site:    Sutures will be removed on your first post-operative appointment (7-10 days following surgery.  Do not  change dressing.  Keep dressing dry.    YOU WILL EXPERIENCE SOME REDNESS, TENDERNESS AND POSSIBLY SOME SWELLING IN THE AREA OF THE INCISIONS.  THIS IS NORMAL AND SHOULD SUBSIDE AFTER THE FIRST 10-14 DAYS.      Notify the office should there be any concern related to the incisions.    Fever greater than 101 degrees.    Heavy bleeding at incision site.    Take ALL of the oral antibiotics prescribed for you following surgery.    FOR 6-8 WEEKS FOLLOWING SURGERY    Position your bed to maintain body alignment.    Sleep with firm mattress, which equally supposrt your legs and back.    Resume showering and bathing.    Follow your physicians's recommendations regarding sexual activity.    Obtain approvalfrom your physician before having your spine manipulated by physical therapy or another physician (the manipulation may disrupt the position of the lead).    Build up you physical strength by walking for brief periods of time each day or engaging in a physical therapy program according to your physicians instruction.      [facility]  Home Care Following Anesthesia  For 24 hours after surgery:  1. Get plenty of rest.  A responsible adult must stay with you for at least 24 hours after you leave the surgery center.  2. Do not drive or use heavy equipment.  If you have weakness or tingling, don't drive or use heavy equipment until this feeling goes away.   3. Do not drink alcohol.   4. Avoid strenuous or risky activities.  Ask for help when climbing stairs.  5. You may feel lightheaded.  IF so, sit for a few minutes before standing.  Have someone help you get up.   6. If you have nausea (feel sick to your stomach): Drink only clear liquids such as apple juice, ginger ale, broth or 7-Up.  Rest may also help.  Be sure to drink enough fluids.  Move to a regular diet as you feel able.   7. You may have a slight fever.  Call the doctor if your fever is over 100 F (37.7 C) (taken under the tongue) or lasts longer than 24 hours.  8. You may have a dry mouth, a sore throat, muscle aches or trouble sleeping. These should go away after 24 hours.  9. Do not make important or legal decisions.   Today you received a Marcaine or bupivacaine block to numb the nerves near your surgery site.  This is a block using local anesthetic or \"numbing\" medication injected around the nerves to anesthetize or \"numb\" the area supplied by those nerves.  This block is injected into the muscle layer near your surgical site.  The medication may numb the location where you had surgery for 6-18 hours, but may last up to 24 hours.  If your surgical site is an arm or leg you should be careful with your affected limb, since it is possible to injure your limb without being aware of it due to the numbing.  Until full feeling returns, you should guard against bumping or hitting your limb, and avoid extreme hot or cold temperatures on the skin.  As the block wears off, the feeling will return as a tingling or prickly sensation near your surgical site.  You will experience " more discomfort from your incision as the feeling returns.  You may want to take a pain pill (a narcotic or Tylenol if this was prescribed by your surgeon) when you start to experience mild pain before the pain beccomes more severe.  If your pain medications do not control your pain you should notifiy your surgeon.    Tips for taking pain medications  To get the best pain relief possible, remember these points:    Take pain medications as directed, before pain becomes severe.    Pain medication can upset your stomach: taking it with food may help.    Constipation is a common side effect of pain medication. Drink plenty of  fluids.    Eat foods high in fiber. Take a stool softener if recommended by your doctor or pharmacist.    Do not drink alcohol, drive or operate machinery while taking pain medications.    Ask about other ways to control pain, such as with heat, ice or relaxation.    Tylenol/Acetaminophen Consumption  To help encourage the safe use of acetaminophen, the makers of TYLENOL  have lowered the maximum daily dose for single-ingredient Extra Strength TYLENOL  (acetaminophen) products sold in the [place] from 8 pills per day (4,000 mg) to 6 pills per day (3,000 mg). The dosing interval has also changed from 2 pills every 4-6 hours to 2 pills every 6 hours.    If you feel your pain relief is insufficient, you may take Tylenol/Acetaminophen in addition to your narcotic pain medication.     Be careful not to exceed 3,000 mg of Tylenol/Acetaminophen in a 24 hour period from all sources.    If you are taking extra strength Tylenol/acetaminophen (500 mg), the maximum dose is 6 tablets in 24 hours.    If you are taking regular strength acetaminophen (325 mg), the maximum dose is 9 tablets in 24 hours.    Call a doctor for any of the followin. Signs of infection (fever, growing tenderness at the surgery site, a large amount of drainage or bleeding, severe pain, foul-smelling drainage, redness, swelling).  2. It  has been over 8 to 10 hours since surgery and you are still not able to urinate (pass water).  3. Headache for over 24 hours.  Your doctor is:  Dr. Felix Aponte, Anesthesia Pain Service: [phone]  Or dial [phone] and ask for the resident on call for:  Anesthesia Pain Service  For emergency care, call the:  [facility]:  [phone] (TTY for hearing impaired: [phone])

## 2018-07-02 NOTE — IP AVS SNAPSHOT
[facility]    [address] [place] [id number]    Phone:  [phone]    Fax:  [phone]                                       After Visit Summary   7/2/2018    Phyllis Vela    MRN: [id number]           After Visit Summary Signature Page     I have received my discharge instructions, and my questions have been answered. I have discussed any challenges I see with this plan with the nurse or doctor.    ..........................................................................................................................................  Patient/Patient Representative Signature      ..........................................................................................................................................  Patient Representative Print Name and Relationship to Patient    ..................................................               ................................................  Date                                            Time    ..........................................................................................................................................  Reviewed by Signature/Title    ...................................................              ..............................................  Date                                                            Time

## 2018-07-02 NOTE — ANESTHESIA POSTPROCEDURE EVALUATION
Patient: Phyllis Vela    Procedure(s):  Bilateral Spinal Cord Stimulator Implant - Wound Class: I-Clean    Diagnosis:Pain  Diagnosis Additional Information: No value filed.    Anesthesia Type:  MAC    Note:  Anesthesia Post Evaluation    Patient location during evaluation: Phase 2  Patient participation: Able to fully participate in evaluation  Level of consciousness: awake and alert  Pain management: adequate  Airway patency: patent  Cardiovascular status: acceptable  Respiratory status: acceptable  Hydration status: acceptable  PONV: none     Anesthetic complications: None          Last vitals:  Vitals:    07/02/18 0604 07/02/18 1126 07/02/18 1155   BP: (!) 121/92 118/81 125/88   Pulse: 73     Resp: 16 16 16   Temp: 36.9  C (98.5  F) 36.8  C (98.2  F)    SpO2: 99% 100% 99%         Electronically Signed By: Zen Monroe MD  July 2, 2018  12:52 PM

## 2018-07-02 NOTE — ANESTHESIA CARE TRANSFER NOTE
Patient: Phyllis Vela    Procedure(s):  Bilateral Spinal Cord Stimulator Implant - Wound Class: I-Clean    Diagnosis: Pain  Diagnosis Additional Information: No value filed.    Anesthesia Type:   MAC     Note:  Airway :Room Air  Patient transferred to:Phase II  Comments: Arrive Phase II, Stable, Airway Intact  118/81, 77,16,100%  All questions answered.  Handoff Report: Identifed the Patient, Identified the Reponsible Provider, Reviewed the pertinent medical history, Discussed the surgical course, Reviewed Intra-OP anesthesia mangement and issues during anesthesia, Set expectations for post-procedure period and Allowed opportunity for questions and acknowledgement of understanding      Vitals: (Last set prior to Anesthesia Care Transfer)    CRNA VITALS  7/2/2018 1051 - 7/2/2018 1124      7/2/2018             Resp Rate (set): 10                Electronically Signed By: AMIRAH Smith CRNA  July 2, 2018  11:24 AM

## 2018-07-02 NOTE — OP NOTE
PROCEDURE: SPINAL CORD STIMULATOR IMPLANT (Abbott)    OPERATION: Percutaneous SCS lead implantation x 2 and Abbott system implantable pulse generator.    ANESTHESIA: MAC    PREOPERATIVE DIAGNOSIS: lumbar post-laminectomy syndrome    POSTOPERATIVE DIAGNOSIS: lumbar post-laminectomy syndrome    PREOPERATIVE ANTIBIOTICS: Vancomycin 1.5g IV given 30 minutes prior to incision, see anesthesia record for time.    OPERATIVE PROCEDURE: The patient was brought to the operating room and was placed in the prone position. The patient was prepped and draped in the usual sterile fashion with DuraPrep two times. With fluoroscopic guidance the location of the desired site for placement of the percutaneous leads was identified and local anesthetic consisting of 2% Lidocaine and 0.5% Bupivacaine with 1:100,000 epinephrine was injected subcutaneously over the area. An approximately 6cm vertical incision was made in the midline. The incision was dissected down to the supraspinous ligament. Hemostasis was established and a self-retaining retractor was used to allow adequate visualization.  A 14-gauge Touhy was then introduced with the paraspinous approach under fluoroscopic guidance into the T12-L1 interlaminar space. The entry of the Tuohy into the epidural space was verified by using loss of resistance to saline with a glass 5 mL syringe. The LEFT lead was passed through the needle and advanced up to top T7 with fluoroscopic guidance. A second lead was then placed in identical fashion on the RIGHT and placed at top T8. The leads were then anchored to the supraspinous ligaments with #2-0 Ethibond suture using the Swiftlock anchoring devices.  The leads were then reconnected to the screening cable and retested for position and placement.  A pocket site was identified just below the LEFT iliac crest.  Local anesthetic was administered in the region and a five (5) centimeter incision was made.  A subcutaneous pocket was then created with a  blunt dissection technique for placement of the Abbott implantable pulse generator. The tunneling was then done between the lead and the pocket site after the administration of additional local anesthetic subcutaneously.  The tunneling tool with a wedged tip attachment was introduced subcutaneously from the lead incision and guided to the pocket. The leads were inserted into the tunneling catheter and advanced to the pocket site. The leads were then inserted into the implantable pulse generator and the screws were tightened with the hexwrench. The generator was then introduced into the pocket and remote tested to ensure adequate placement of the leads into the IPG. Both surgical wounds were then irrigated with Vancomycin antibiotic solution and the wounds were closed with #2-0 Vicryl suture. This was followed-up with a subcuticular #3-0 Vicryl suture and a #4-0 Monocryl running stitch. A sterile dressing was applied. No specimens collected. The patient was discharged to PACU in stable condition.    COMPLICATIONS: There were no complications.  ESTIMATED BLOOD LOSS: The estimated blood loss 10mL.      Plan:    1. RTC in 7 days for suture removal and device programming  2. No showering or bathing for 7 days  3. Abdominal binder continuously for 7 days  4. Doxycycline 100mg BID for 14 days  5. Percocet 10/325 Q6H PRN for 7 days  6. No heavy lifting >10lbs for 45 days

## 2018-07-02 NOTE — IP AVS SNAPSHOT
MRN:[id number]                      After Visit Summary   7/2/2018    Phyllis Vela    MRN: [id number]           Thank you!     Thank you for choosing [facility] for your care. Our goal is always to provide you with excellent care. Hearing back from our patients is one way we can continue to improve our services. Please take a few minutes to complete the written survey that you may receive in the mail after you visit with us. Thank you!        Patient Information     Date Of Birth          1970        About your hospital stay     You were admitted on:  July 2, 2018 You last received care in the:  [facility]    You were discharged on:  July 2, 2018       Who to Call     For medical emergencies, please call 911.  For non-urgent questions about your medical care, please call your primary care provider or clinic, [phone]  For questions related to your surgery, please call your surgery clinic        Attending Provider     Provider Felix Anne MD Anesthesiology       Primary Care Provider Office Phone # Fax #    Sonya Beasley PA-C [phone]      Your next 10 appointments already scheduled     Jul 13, 2018  7:20 AM CDT   (Arrive by 7:05 AM)   Return Visit with AMIRAH Valencia CNP   [facility] ([facility])    [address] [place] [id number] [phone]            Jul 30, 2018  9:00 AM CDT   (Arrive by 8:45 AM)   Return Visit with AMIRAH Valencia CNP   [facility] ([facility])    [address] [place] [id number] [phone]              Further instructions from your care team       PAIN MANAGEMENT PATIENT  DISCHARGE INSTRUCTIONS FOR PATIENTS WITH SPINAL CORD STIMULATORS    Activities:    Rest for the first 24  hours.    Raise the head of your bed about 20 degrees for the firt post-operative night to stabilize your spine.  The Day After Surgery    Walk for brief periods of time keeping your back as straight as possible to prevent lead movement.    No swimming/hot tubs/lakes for 45 days after implant.    WEAR ABDOMINAL BINDER CONTINUOUSLY 24 HOURS PER DAY UNTIL YOUR FIRST POST-OPERATIVE APPOINTMENT    Diet:    Start with clear liquids or a light meal.  If you do not become nauseated, gradually progress to a normal diet.    Drink 8-6 oz. glasses of water a day.    NO ALCOHOLIC BEVERAGES.    No smoking, this will delay healing.    Incision Site:    Sutures will be removed on your first post-operative appointment (7-10 days following surgery.  Do not  change dressing.  Keep dressing dry.    YOU WILL EXPERIENCE SOME REDNESS, TENDERNESS AND POSSIBLY SOME SWELLING IN THE AREA OF THE INCISIONS.  THIS IS NORMAL AND SHOULD SUBSIDE AFTER THE FIRST 10-14 DAYS.      Notify the office should there be any concern related to the incisions.    Fever greater than 101 degrees.    Heavy bleeding at incision site.    Take ALL of the oral antibiotics prescribed for you following surgery.    FOR 6-8 WEEKS FOLLOWING SURGERY    Position your bed to maintain body alignment.    Sleep with firm mattress, which equally supposrt your legs and back.    Resume showering and bathing.    Follow your physicians's recommendations regarding sexual activity.    Obtain approvalfrom your physician before having your spine manipulated by physical therapy or another physician (the manipulation may disrupt the position of the lead).    Build up you physical strength by walking for brief periods of time each day or engaging in a physical therapy program according to your physicians instruction.      [facility]  Home Care Following Anesthesia  For 24 hours after surgery:  1. Get plenty of rest.  A responsible adult must stay with  "you for at least 24 hours after you leave the surgery center.  2. Do not drive or use heavy equipment.  If you have weakness or tingling, don't drive or use heavy equipment until this feeling goes away.   3. Do not drink alcohol.   4. Avoid strenuous or risky activities.  Ask for help when climbing stairs.  5. You may feel lightheaded.  IF so, sit for a few minutes before standing.  Have someone help you get up.   6. If you have nausea (feel sick to your stomach): Drink only clear liquids such as apple juice, ginger ale, broth or 7-Up.  Rest may also help.  Be sure to drink enough fluids.  Move to a regular diet as you feel able.   7. You may have a slight fever.  Call the doctor if your fever is over 100 F (37.7 C) (taken under the tongue) or lasts longer than 24 hours.  8. You may have a dry mouth, a sore throat, muscle aches or trouble sleeping. These should go away after 24 hours.  9. Do not make important or legal decisions.   Today you received a Marcaine or bupivacaine block to numb the nerves near your surgery site.  This is a block using local anesthetic or \"numbing\" medication injected around the nerves to anesthetize or \"numb\" the area supplied by those nerves.  This block is injected into the muscle layer near your surgical site.  The medication may numb the location where you had surgery for 6-18 hours, but may last up to 24 hours.  If your surgical site is an arm or leg you should be careful with your affected limb, since it is possible to injure your limb without being aware of it due to the numbing.  Until full feeling returns, you should guard against bumping or hitting your limb, and avoid extreme hot or cold temperatures on the skin.  As the block wears off, the feeling will return as a tingling or prickly sensation near your surgical site.  You will experience more discomfort from your incision as the feeling returns.  You may want to take a pain pill (a narcotic or Tylenol if this was prescribed " by your surgeon) when you start to experience mild pain before the pain beccomes more severe.  If your pain medications do not control your pain you should notifiy your surgeon.    Tips for taking pain medications  To get the best pain relief possible, remember these points:    Take pain medications as directed, before pain becomes severe.    Pain medication can upset your stomach: taking it with food may help.    Constipation is a common side effect of pain medication. Drink plenty of  fluids.    Eat foods high in fiber. Take a stool softener if recommended by your doctor or pharmacist.    Do not drink alcohol, drive or operate machinery while taking pain medications.    Ask about other ways to control pain, such as with heat, ice or relaxation.    Tylenol/Acetaminophen Consumption  To help encourage the safe use of acetaminophen, the makers of TYLENOL  have lowered the maximum daily dose for single-ingredient Extra Strength TYLENOL  (acetaminophen) products sold in the [place] from 8 pills per day (4,000 mg) to 6 pills per day (3,000 mg). The dosing interval has also changed from 2 pills every 4-6 hours to 2 pills every 6 hours.    If you feel your pain relief is insufficient, you may take Tylenol/Acetaminophen in addition to your narcotic pain medication.     Be careful not to exceed 3,000 mg of Tylenol/Acetaminophen in a 24 hour period from all sources.    If you are taking extra strength Tylenol/acetaminophen (500 mg), the maximum dose is 6 tablets in 24 hours.    If you are taking regular strength acetaminophen (325 mg), the maximum dose is 9 tablets in 24 hours.    Call a doctor for any of the followin. Signs of infection (fever, growing tenderness at the surgery site, a large amount of drainage or bleeding, severe pain, foul-smelling drainage, redness, swelling).  2. It has been over 8 to 10 hours since surgery and you are still not able to urinate (pass water).  3. Headache for over 24 hours.  Your doctor  "is:  Dr. Felix Aponte, Anesthesia Pain Service: [phone]  Or dial [phone] and ask for the resident on call for:  Anesthesia Pain Service  For emergency care, call the:  [facility]:  [phone] (TTY for hearing impaired: [phone])    Pending Results     Date and Time Order Name Status Description    7/2/2018 0648 XR SURGERY JUDITH FLUORO LESS THAN 5 MIN W STILLS In process             Admission Information     Date & Time Provider Department Dept. Phone    7/2/2018 Felix Aponte MD [facility] [phone]      Your Vitals Were     Blood Pressure Pulse Temperature Respirations Height Weight    121/92 73 98.5  F (36.9  C) (Oral) 16 1.689 m (5' 6.5\") 66.2 kg (146 lb)    Pulse Oximetry BMI (Body Mass Index)                99% 23.21 kg/m2          Apprats Information     App[employer]ats gives you secure access to your electronic health record. If you see a primary care provider, you can also send messages to your care team and make appointments. If you have questions, please call your primary care clinic.  If you do not have a primary care provider, please call [phone] and they will assist you.      Apprats is an electronic gateway that provides easy, online access to your medical records. With App[employer]ats, you can request a clinic appointment, read your test results, renew a prescription or communicate with your care team.     To access your existing account, please contact your [facility] or call [phone] for assistance.        Care EveryWhere ID     This is your Care EveryWhere ID. This could be used by other organizations to access your [facility] medical records  [id number]        Equal Access to Services     [facility] : Hadii cece Carmen, waquinda luchance, qaybta janine bella. So [facility] [phone].    ATENCIÓN: Si habla español, tiene a watson " disposición servicios gratuitos de asistencia lingüística. Randall wilson [phone].    We comply with applicable federal civil rights laws and [place] laws. We do not discriminate on the basis of race, color, national origin, age, disability, sex, sexual orientation, or gender identity.               Review of your medicines      START taking        Dose / Directions    doxycycline 100 MG capsule   Commonly known as:  VIBRAMYCIN   Used for:  Post laminectomy syndrome        Dose:  100 mg   Take 1 capsule (100 mg) by mouth 2 times daily 14 days.   Quantity:  28 capsule   Refills:  0         CONTINUE these medicines which may have CHANGED, or have new prescriptions. If we are uncertain of the size of tablets/capsules you have at home, strength may be listed as something that might have changed.        Dose / Directions    * oxyCODONE-acetaminophen 7.5-325 MG per tablet   Commonly known as:  PERCOCET   This may have changed:  Another medication with the same name was added. Make sure you understand how and when to take each.   Used for:  Lumbar radiculopathy        Dose:  1 tablet   Take 1 tablet by mouth every 6 hours as needed for severe pain   Quantity:  20 tablet   Refills:  0       * oxyCODONE-acetaminophen  MG per tablet   Commonly known as:  PERCOCET   This may have changed:  You were already taking a medication with the same name, and this prescription was added. Make sure you understand how and when to take each.   Used for:  Post laminectomy syndrome        Dose:  1 tablet   Take 1 tablet by mouth every 6 hours as needed for severe pain Post-operative pain.   Quantity:  28 tablet   Refills:  0       * Notice:  This list has 2 medication(s) that are the same as other medications prescribed for you. Read the directions carefully, and ask your doctor or other care provider to review them with you.      CONTINUE these medicines which have NOT CHANGED        Dose / Directions    albuterol 108 (90 Base)  MCG/ACT Inhaler   Commonly known as:  PROAIR HFA/PROVENTIL HFA/VENTOLIN HFA        Dose:  2 puff   Inhale 2 puffs into the lungs as needed   Refills:  0       amphetamine-dextroamphetamine 30 MG per 24 hr capsule   Commonly known as:  ADDERALL XR        Dose:  1 capsule   Take 1 capsule by mouth as needed   Refills:  0       Calcium-Magnesium-Zinc 333-133-5 MG Tabs per tablet        Dose:  1 tablet   Take 1 tablet by mouth daily   Refills:  0       cetirizine 10 MG tablet   Commonly known as:  zyrTEC        Dose:  10 mg   Take 10 mg by mouth   Refills:  0       cholecalciferol 1000 UNIT tablet   Commonly known as:  vitamin D3        Dose:  1000 Units   Take 1,000 Units by mouth daily   Refills:  0       CHROMIUM PO        Dose:  1 tablet   Take 1 tablet by mouth daily   Refills:  0       cyclobenzaprine 10 MG tablet   Commonly known as:  FLEXERIL   Used for:  Back muscle spasm        Dose:  10 mg   Take 1 tablet (10 mg) by mouth nightly as needed for muscle spasms   Quantity:  30 tablet   Refills:  6       metFORMIN 500 MG tablet   Commonly known as:  GLUCOPHAGE        Dose:  500 mg   Take 500 mg by mouth   Refills:  0       QC WOMENS DAILY MULTIvitamin Tabs        Dose:  1 tablet   Take 1 tablet by mouth daily   Refills:  0       topiramate 25 MG tablet   Commonly known as:  TOPAMAX   Used for:  Neuropathic pain        Take 25 mg in AM, and 50 mg in the Evening.   Quantity:  90 tablet   Refills:  3       traMADol 50 MG tablet   Commonly known as:  ULTRAM   Used for:  Neuropathic pain, Lumbar radiculopathy        Dose:  25-50 mg   Take 0.5-1 tablets (25-50 mg) by mouth every 6 hours as needed for severe pain   Quantity:  10 tablet   Refills:  0            Where to get your medicines      Some of these will need a paper prescription and others can be bought over the counter. Ask your nurse if you have questions.     Bring a paper prescription for each of these medications     doxycycline 100 MG capsule     oxyCODONE-acetaminophen  MG per tablet                Protect others around you: Learn how to safely use, store and throw away your medicines at www.disposemymeds.org.        ANTIBIOTIC INSTRUCTION     You've Been Prescribed an Antibiotic - Now What?  Your healthcare team thinks that you or your loved one might have an infection. Some infections can be treated with antibiotics, which are powerful, life-saving drugs. Like all medications, antibiotics have side effects and should only be used when necessary. There are some important things you should know about your antibiotic treatment.      Your healthcare team may run tests before you start taking an antibiotic.    Your team may take samples (e.g., from your blood, urine or other areas) to run tests to look for bacteria. These test can be important to determine if you need an antibiotic at all and, if you do, which antibiotic will work best.      Within a few days, your healthcare team might change or even stop your antibiotic.    Your team may start you on an antibiotic while they are working to find out what is making you sick.    Your team might change your antibiotic because test results show that a different antibiotic would be better to treat your infection.    In some cases, once your team has more information, they learn that you do not need an antibiotic at all. They may find out that you don't have an infection, or that the antibiotic you're taking won't work against your infection. For example, an infection caused by a virus can't be treated with antibiotics. Staying on an antibiotic when you don't need it is more likely to be harmful than helpful.      You may experience side effects from your antibiotic.    Like all medications, antibiotics have side effects. Some of these can be serious.    Let you healthcare team know if you have any known allergies when you are admitted to the hospital.    One significant side effect of nearly all antibiotics  is the risk of severe and sometimes deadly diarrhea caused by Clostridium difficile (C. Difficile). This occurs when a person takes antibiotics because some good germs are destroyed. Antibiotic use allows C. diificile to take over, putting patients at high risk for this serious infection.    As a patient or caregiver, it is important to understand your or your loved one's antibiotic treatment. It is especially important for caregivers to speak up when patients can't speak for themselves. Here are some important questions to ask your healthcare team.    What infection is this antibiotic treating and how do you know I have that infection?    What side effects might occur from this antibiotic?    How long will I need to take this antibiotic?    Is it safe to take this antibiotic with other medications or supplements (e.g., vitamins) that I am taking?     Are there any special directions I need to know about taking this antibiotic? For example, should I take it with food?    How will I be monitored to know whether my infection is responding to the antibiotic?    What tests may help to make sure the right antibiotic is prescribed for me?      Information provided by:  www.cdc.gov/getsmart  U.S. Department of Health and Human Services  Centers for disease Control and Prevention  National Center for Emerging and Zoonotic Infectious Diseases  Division of Healthcare Quality Promotion        Information about OPIOIDS     PRESCRIPTION OPIOIDS: WHAT YOU NEED TO KNOW   We gave you an opioid (narcotic) pain medicine. It is important to manage your pain, but opioids are not always the best choice. You should first try all the other options your care team gave you. Take this medicine for as short a time (and as few doses) as possible.     These medicines have risks:    DO NOT drive when on new or higher doses of pain medicine. These medicines can affect your alertness and reaction times, and you could be arrested for driving under  the influence (DUI). If you need to use opioids long-term, talk to your care team about driving.    DO NOT operate heave machinery    DO NOT do any other dangerous activities while taking these medicines.     DO NOT drink any alcohol while taking these medicines.      If the opioid prescribed includes acetaminophen, DO NOT take with any other medicines that contain acetaminophen. Read all labels carefully. Look for the word  acetaminophen  or  Tylenol.  Ask your pharmacist if you have questions or are unsure.    You can get addicted to pain medicines, especially if you have a history of addiction (chemical, alcohol or substance dependence). Talk to your care team about ways to reduce this risk.    Store your pills in a secure place, locked if possible. We will not replace any lost or stolen medicine. If you don t finish your medicine, please throw away (dispose) as directed by your pharmacist. The [place] Pollution Control Agency has more information about safe disposal: https://www.pca.[facility].us/living-green/managing-unwanted-medications.     All opioids tend to cause constipation. Drink plenty of water and eat foods that have a lot of fiber, such as fruits, vegetables, prune juice, apple juice and high-fiber cereal. Take a laxative (Miralax, milk of magnesia, Colace, Senna) if you don t move your bowels at least every other day.              Medication List: This is a list of all your medications and when to take them. Check marks below indicate your daily home schedule. Keep this list as a reference.      Medications           Morning Afternoon Evening Bedtime As Needed    albuterol 108 (90 Base) MCG/ACT Inhaler   Commonly known as:  PROAIR HFA/PROVENTIL HFA/VENTOLIN HFA   Inhale 2 puffs into the lungs as needed                                amphetamine-dextroamphetamine 30 MG per 24 hr capsule   Commonly known as:  ADDERALL XR   Take 1 capsule by mouth as needed                                 Calcium-Magnesium-Zinc 333-133-5 MG Tabs per tablet   Take 1 tablet by mouth daily                                cetirizine 10 MG tablet   Commonly known as:  zyrTEC   Take 10 mg by mouth                                cholecalciferol 1000 UNIT tablet   Commonly known as:  vitamin D3   Take 1,000 Units by mouth daily                                CHROMIUM PO   Take 1 tablet by mouth daily                                cyclobenzaprine 10 MG tablet   Commonly known as:  FLEXERIL   Take 1 tablet (10 mg) by mouth nightly as needed for muscle spasms                                doxycycline 100 MG capsule   Commonly known as:  VIBRAMYCIN   Take 1 capsule (100 mg) by mouth 2 times daily 14 days.                                metFORMIN 500 MG tablet   Commonly known as:  GLUCOPHAGE   Take 500 mg by mouth                                * oxyCODONE-acetaminophen 7.5-325 MG per tablet   Commonly known as:  PERCOCET   Take 1 tablet by mouth every 6 hours as needed for severe pain                                * oxyCODONE-acetaminophen  MG per tablet   Commonly known as:  PERCOCET   Take 1 tablet by mouth every 6 hours as needed for severe pain Post-operative pain.                                QC WOMENS DAILY MULTIvitamin Tabs   Take 1 tablet by mouth daily                                topiramate 25 MG tablet   Commonly known as:  TOPAMAX   Take 25 mg in AM, and 50 mg in the Evening.                                traMADol 50 MG tablet   Commonly known as:  ULTRAM   Take 0.5-1 tablets (25-50 mg) by mouth every 6 hours as needed for severe pain                                * Notice:  This list has 2 medication(s) that are the same as other medications prescribed for you. Read the directions carefully, and ask your doctor or other care provider to review them with you.

## 2018-07-03 ENCOUNTER — MYC MEDICAL ADVICE (OUTPATIENT)
Dept: ANESTHESIOLOGY | Facility: CLINIC | Age: 48
End: 2018-07-03

## 2018-07-05 ENCOUNTER — TELEPHONE (OUTPATIENT)
Dept: ANESTHESIOLOGY | Facility: CLINIC | Age: 48
End: 2018-07-05

## 2018-07-05 DIAGNOSIS — R11.0 NAUSEA: Primary | ICD-10-CM

## 2018-07-05 RX ORDER — ONDANSETRON 4 MG/1
TABLET, ORALLY DISINTEGRATING ORAL
Qty: 20 TABLET | Refills: 0 | Status: SHIPPED | OUTPATIENT
Start: 2018-07-05 | End: 2018-08-28

## 2018-07-05 NOTE — TELEPHONE ENCOUNTER
Health Call Center    Phone Message    May a detailed message be left on voicemail: yes    Reason for Call: Symptoms or Concerns     If patient has red-flag symptoms, warm transfer to triage line    Current symptom or concern: Nausea with Vomit, (Pt feels the meds are too strong causing the vomit and nausea).    Symptoms have been present for:  1 day(s)    Has patient previously been seen for this? No    By : Dr. Aponte    Date: 7/2/18    Are there any new or worsening symptoms? Yes: Nausea and Vomit, Pt cannot get into mychart please call the pt back.      Action Taken: Message routed to:  [facility] ([facility] Pain

## 2018-07-05 NOTE — TELEPHONE ENCOUNTER
"[facility] called pt to f/u with report of increased pain after scs implant.  Pt reported she is also experiencing nausea and vomiting.  Pt reported that historically she has experienced onset of GI symptoms approximately 3 days after previous surgeries related to anesthesia.  Pt has taken zofran in the past to help reduce these symptoms.  Pt expressed concern for dehydration r/t inability to tolerate adequate PO intake.      Pt also reported increased pain.  Pt was instructed to continue/resume topamax, flexeril, and percocet QID PRN, per Dr. Aponte' previous recommendation.  Pt instructed to start taking tylenol 1000mg three times daily as needed for pain and to alternate with percocet.  Pt educated about tylenol 24 hour max dose of 4000mg and to be aware that percocet contains 325mg tylenol per tablet.  Pt reported concerns that 1 percocet tablet is \"too strong,\" and she plans to take 0.5 tablet the next time she needs to take this medication.      RNCC will update Dr. Aponte and follow up with pt with his recommendations.      Adrianna Florence RN, BSN     "

## 2018-07-06 NOTE — TELEPHONE ENCOUNTER
"Verbal orders given by Dr. Aponte for zofran odt 4-8mg TID PRN, dispense 20 tablets.  Pt updated 7/5/18 with this recommendation.        [facility] called pt to f/u on GI symptoms and pain.  Pt reported feeling \"much better\" today after starting zofran PRN and pain medication management recommendations given yesterday.  Pt advised to contact clinic at any time if needed.  Pt has f/u 7/10/18.     Adrianna Florence RN, BSN     "

## 2018-07-10 ENCOUNTER — OFFICE VISIT (OUTPATIENT)
Dept: ANESTHESIOLOGY | Facility: CLINIC | Age: 48
End: 2018-07-10
Payer: COMMERCIAL

## 2018-07-10 VITALS — WEIGHT: 146 LBS | HEIGHT: 66 IN | BODY MASS INDEX: 23.46 KG/M2

## 2018-07-10 DIAGNOSIS — M96.1 FAILED BACK SURGICAL SYNDROME: Primary | ICD-10-CM

## 2018-07-10 DIAGNOSIS — M79.2 NEUROPATHIC PAIN: ICD-10-CM

## 2018-07-10 DIAGNOSIS — M54.16 LUMBAR RADICULOPATHY: ICD-10-CM

## 2018-07-10 RX ORDER — TRAMADOL HYDROCHLORIDE 50 MG/1
25-50 TABLET ORAL EVERY 6 HOURS PRN
Qty: 15 TABLET | Refills: 0 | Status: SHIPPED | OUTPATIENT
Start: 2018-07-10 | End: 2018-07-20

## 2018-07-10 ASSESSMENT — ANXIETY QUESTIONNAIRES
GAD7 TOTAL SCORE: 0
6. BECOMING EASILY ANNOYED OR IRRITABLE: NOT AT ALL
2. NOT BEING ABLE TO STOP OR CONTROL WORRYING: NOT AT ALL
7. FEELING AFRAID AS IF SOMETHING AWFUL MIGHT HAPPEN: NOT AT ALL
1. FEELING NERVOUS, ANXIOUS, OR ON EDGE: NOT AT ALL
7. FEELING AFRAID AS IF SOMETHING AWFUL MIGHT HAPPEN: NOT AT ALL
3. WORRYING TOO MUCH ABOUT DIFFERENT THINGS: NOT AT ALL
GAD7 TOTAL SCORE: 0
5. BEING SO RESTLESS THAT IT IS HARD TO SIT STILL: NOT AT ALL
4. TROUBLE RELAXING: NOT AT ALL
GAD7 TOTAL SCORE: 0

## 2018-07-10 ASSESSMENT — PAIN SCALES - GENERAL: PAINLEVEL: SEVERE PAIN (6)

## 2018-07-10 NOTE — PATIENT INSTRUCTIONS
1. Spinal cord stimulator turned on today.  Please contact the [employer] directly for assistance with programming.     2. Start taking tramadol 50mg up to four times daily as needed for pain.     Follow up: 4 week post op with Dr. Aponte      To speak with a nurse, schedule/reschedule/cancel a clinic appointment, or request a medication refill call: [phone]     You can also reach us by [employer]: [email or web address]    For refills, please call on Monday, 1 week before your medication runs out. The doctors are not always in clinic, so this gives us time to get your prescriptions ready.  Please let us know the name of the medication you are requesting a refill of.

## 2018-07-10 NOTE — LETTER
7/10/2018       RE: Phyllis Vela  [address] [facility] [address]     Dear Colleague,    Thank you for referring your patient, Phyllis Vela, to the [facility] FOR COMPREHENSIVE PAIN MANAGEMENT at [facility]. Please see a copy of my visit note below.    PAIN MEDICINE CLINIC PROCEDURE NOTE     Procedure name: Spinal cord stimulator implantation wound check; [employer] BURSTDR     The patient was brought to the exam room, and the dressings over the percutaneous leads and generator sites were inspected and found intact.  The sterile Opsites were well adherent. The dressings were removed and the suture-tailes removed at implant site utilizing sterile technique and suture removal scissors and forceps. Inscision sites are clean, dry, intact and partially epiltheliazed.    The patient was advised not to immerse under water for 6 weeks but showering is acceptable. The patient was further advised that if the patient develops any signs of infection, neurologic changes such as new numbness or weakness or any other significant acute change, the patient should call us immediately. No lifting objects >5lbs for 6-8 weeks; refrain from exaggerated bending, twisting to prevent lead migration.      Rx provided for tramadol 50 mg, #15 tablets, as patient would like to discontinue Percocet. Goal is be off of narcotics. Patient will be following up 7/30/18 with Dr. Aponte.      AMIRAH Valencia, [facility]  Department of Anesthesiology

## 2018-07-10 NOTE — MR AVS SNAPSHOT
After Visit Summary   7/10/2018    Phyllis Vela    MRN: [id number]           Patient Information     Date Of Birth          1970        Visit Information        Provider Department      7/10/2018 1:10 PM Meena Hilario APRN CNP [facility]        Today's Diagnoses     Failed back surgical syndrome    -  1    Neuropathic pain        Lumbar radiculopathy          Care Instructions    1. Spinal cord stimulator turned on today.  Please contact the [employer] directly for assistance with programming.     2. Start taking tramadol 50mg up to four times daily as needed for pain.     Follow up: 4 week post op with Dr. Aponte      To speak with a nurse, schedule/reschedule/cancel a clinic appointment, or request a medication refill call: [phone]     You can also reach us by [employer]: [email or web address]    For refills, please call on Monday, 1 week before your medication runs out. The doctors are not always in clinic, so this gives us time to get your prescriptions ready.  Please let us know the name of the medication you are requesting a refill of.                                     Follow-ups after your visit        Your next 10 appointments already scheduled     Jul 30, 2018  9:00 AM CDT   (Arrive by 8:45 AM)   Return Visit with AMIRAH Valencia CNP [facility] ([facility])    [address] [place] [id number] [phone]              Who to contact     Please call your clinic at [phone] to:    Ask questions about your health    Make or cancel appointments    Discuss your medicines    Learn about your test results    Speak to your doctor            Additional Information About Your Visit        MyChart Information     [employer] gives you secure access to your electronic health record. If you see a primary care provider, you can also send  "messages to your care team and make appointments. If you have questions, please call your primary care clinic.  If you do not have a primary care provider, please call [phone] and they will assist you.      [employer]UL is an electronic gateway that provides easy, online access to your medical records. With HAUL, you can request a clinic appointment, read your test results, renew a prescription or communicate with your care team.     To access your existing account, please contact your [facility] or call [phone] for assistance.        Care EveryWhere ID     This is your Care EveryWhere ID. This could be used by other organizations to access your [place] medical records  [id number]        Your Vitals Were     Height BMI (Body Mass Index)                1.676 m (5' 6\") 23.57 kg/m2           Blood Pressure from Last 3 Encounters:   07/02/18 125/88   05/21/18 118/78   05/16/18 (!) 138/93    Weight from Last 3 Encounters:   07/10/18 66.2 kg (146 lb)   07/02/18 66.2 kg (146 lb)   05/21/18 68.9 kg (152 lb)              Today, you had the following     No orders found for display       Primary Care Provider Office Phone # Fax #    Sonya Beasley PA-C [phone]       [facility] [address] [address] [facility] [address]        Equal Access to Services     [facility] CenterALFONSO : Hadii aad ku hadasho Soomaali, waaxda luqadaha, qaybta kaalmada adeegyada, janine moody hayhernando womack . So [facility] [phone].    ATENCIÓN: Si habla español, tiene a watson disposición servicios gratuitos de asistencia lingüística. Llame al [phone].    We comply with applicable federal civil rights laws and [place] laws. We do not discriminate on the basis of race, color, national origin, age, disability, sex, sexual orientation, or gender identity.            Thank you!     Thank you for choosing [facility] FOR COMPREHENSIVE PAIN MANAGEMENT  for your care. Our goal is " always to provide you with excellent care. Hearing back from our patients is one way we can continue to improve our services. Please take a few minutes to complete the written survey that you may receive in the mail after your visit with us. Thank you!             Your Updated Medication List - Protect others around you: Learn how to safely use, store and throw away your medicines at www.disposemymeds.org.          This list is accurate as of 7/10/18  2:25 PM.  Always use your most recent med list.                   Brand Name Dispense Instructions for use Diagnosis    albuterol 108 (90 Base) MCG/ACT Inhaler    PROAIR HFA/PROVENTIL HFA/VENTOLIN HFA     Inhale 2 puffs into the lungs as needed        amphetamine-dextroamphetamine 30 MG per 24 hr capsule    ADDERALL XR     Take 1 capsule by mouth as needed        Calcium-Magnesium-Zinc 333-133-5 MG Tabs per tablet      Take 1 tablet by mouth daily        cetirizine 10 MG tablet    zyrTEC     Take 10 mg by mouth        cholecalciferol 1000 UNIT tablet    vitamin D3     Take 1,000 Units by mouth daily        CHROMIUM PO      Take 1 tablet by mouth daily        cyclobenzaprine 10 MG tablet    FLEXERIL    30 tablet    Take 1 tablet (10 mg) by mouth nightly as needed for muscle spasms    Back muscle spasm       doxycycline 100 MG capsule    VIBRAMYCIN    28 capsule    Take 1 capsule (100 mg) by mouth 2 times daily 14 days.    Post laminectomy syndrome       metFORMIN 500 MG tablet    GLUCOPHAGE     Take 500 mg by mouth        ondansetron 4 MG ODT tab    ZOFRAN-ODT    20 tablet    Take 4mg - 8mg every 8 hours as needed    Nausea       * oxyCODONE-acetaminophen 7.5-325 MG per tablet    PERCOCET    20 tablet    Take 1 tablet by mouth every 6 hours as needed for severe pain    Lumbar radiculopathy       * oxyCODONE-acetaminophen  MG per tablet    PERCOCET    28 tablet    Take 1 tablet by mouth every 6 hours as needed for severe pain Post-operative pain.    Post  laminectomy syndrome       QC WOMENS DAILY MULTIvitamin Tabs      Take 1 tablet by mouth daily        topiramate 25 MG tablet    TOPAMAX    90 tablet    Take 25 mg in AM, and 50 mg in the Evening.    Neuropathic pain       traMADol 50 MG tablet    ULTRAM    10 tablet    Take 0.5-1 tablets (25-50 mg) by mouth every 6 hours as needed for severe pain    Neuropathic pain, Lumbar radiculopathy       * Notice:  This list has 2 medication(s) that are the same as other medications prescribed for you. Read the directions carefully, and ask your doctor or other care provider to review them with you.

## 2018-07-10 NOTE — PROGRESS NOTES
PAIN MEDICINE CLINIC PROCEDURE NOTE     Procedure name: Spinal cord stimulator implantation wound check; [employer] BURSTDR     The patient was brought to the exam room, and the dressings over the percutaneous leads and generator sites were inspected and found intact.  The sterile Opsites were well adherent. The dressings were removed and the suture-tailes removed at implant site utilizing sterile technique and suture removal scissors and forceps. Inscision sites are clean, dry, intact and partially epiltheliazed.    The patient was advised not to immerse under water for 6 weeks but showering is acceptable. The patient was further advised that if the patient develops any signs of infection, neurologic changes such as new numbness or weakness or any other significant acute change, the patient should call us immediately. No lifting objects >5lbs for 6-8 weeks; refrain from exaggerated bending, twisting to prevent lead migration.      Rx provided for tramadol 50 mg, #15 tablets, as patient would like to discontinue Percocet. Goal is be off of narcotics. Patient will be following up 7/30/18 with Dr. Aponte.      AMIRAH Valencia, [facility]  Department of Anesthesiology   Answers for HPI/ROS submitted by the patient on 7/10/2018   JESÚS 7 TOTAL SCORE: 0  PHQ-2 Score: 0

## 2018-07-11 ASSESSMENT — ANXIETY QUESTIONNAIRES: GAD7 TOTAL SCORE: 0

## 2018-07-17 ENCOUNTER — MYC MEDICAL ADVICE (OUTPATIENT)
Dept: ANESTHESIOLOGY | Facility: CLINIC | Age: 48
End: 2018-07-17

## 2018-07-17 DIAGNOSIS — M79.2 NEUROPATHIC PAIN: ICD-10-CM

## 2018-07-17 DIAGNOSIS — M54.16 LUMBAR RADICULOPATHY: ICD-10-CM

## 2018-07-18 DIAGNOSIS — M96.1 POST LAMINECTOMY SYNDROME: ICD-10-CM

## 2018-07-18 RX ORDER — OXYCODONE AND ACETAMINOPHEN 10; 325 MG/1; MG/1
1 TABLET ORAL
Qty: 7 TABLET | Refills: 0 | Status: SHIPPED | OUTPATIENT
Start: 2018-07-18 | End: 2018-08-02

## 2018-07-18 NOTE — TELEPHONE ENCOUNTER
Pt requesting refill of percocet to take 1 tablet at bedtime as needed for pain.  Pt had SCS implant 7/2/18.  Per protocol, Dr. Aponte notified of request for refill after 14 days post op.  Dr. Aponte agreeable to refill percocet for 7 tablets only to take 1 tablet at bedtime for post operative pain.  No additional narcotic refill.   checked and is appropriate. Pt updated.    Adrianna Florence, RN, BSN

## 2018-07-20 RX ORDER — TRAMADOL HYDROCHLORIDE 50 MG/1
25-50 TABLET ORAL EVERY 6 HOURS PRN
Qty: 15 TABLET | Refills: 0 | Status: SHIPPED | OUTPATIENT
Start: 2018-07-20 | End: 2018-09-12

## 2018-07-20 NOTE — TELEPHONE ENCOUNTER
Verbal orders given by Dr. Aponte for refill of tramadol, 15 tablet quantity.  Pt updated with recommendation and notified that prescription will be sent to pharmacy.  Pt also notified that scs rep will be present for her f/u on 7/30. Pt verbalized understanding.     Adrianna Florence, RN, BSN

## 2018-07-30 ENCOUNTER — OFFICE VISIT (OUTPATIENT)
Dept: ANESTHESIOLOGY | Facility: CLINIC | Age: 48
End: 2018-07-30
Payer: COMMERCIAL

## 2018-07-30 VITALS
DIASTOLIC BLOOD PRESSURE: 76 MMHG | WEIGHT: 146 LBS | HEART RATE: 72 BPM | RESPIRATION RATE: 16 BRPM | BODY MASS INDEX: 23.46 KG/M2 | SYSTOLIC BLOOD PRESSURE: 112 MMHG | HEIGHT: 66 IN

## 2018-07-30 DIAGNOSIS — Z96.89 SPINAL CORD STIMULATOR STATUS: Primary | ICD-10-CM

## 2018-07-30 ASSESSMENT — PAIN SCALES - GENERAL: PAINLEVEL: MODERATE PAIN (4)

## 2018-07-30 NOTE — PATIENT INSTRUCTIONS
1. Continue Activity restrictions over the next 2-4 weeks.   - No submerging in water for 45 days, after implant.   -No exaggerated bending or twisting, or lifting over 5 pounds.    Follow up: With Dr. Aponte for your 6 month (from implant) appointment in clinic.      To speak with a nurse, schedule/reschedule/cancel a clinic appointment, or request a medication refill call: [phone]     You can also reach us by [employer]: [email or web address]    For refills, please call on Monday, 1 week before your medication runs out. The doctors are not always in clinic, so this gives us time to get your prescriptions ready.  Please let us know the name of the medication you are requesting a refill of.

## 2018-07-30 NOTE — NURSING NOTE
LPN reviewed AVS with Pt.  Pt verbalized an understanding of information, and was asked to contact clinic with questions.    Buffy Benedict LPN

## 2018-07-30 NOTE — MR AVS SNAPSHOT
After Visit Summary   7/30/2018    Phyllis Vela    MRN: [id number]           Patient Information     Date Of Birth          1970        Visit Information        Provider Department      7/30/2018 9:00 AM Meena Hilario APRN [facility]        Care Instructions    1. Continue Activity restrictions over the next 2-4 weeks.   - No submerging in water for 45 days, after implant.   -No exaggerated bending or twisting, or lifting over 5 pounds.    Follow up: With Dr. Aponte for your 6 month (from implant) appointment in clinic.      To speak with a nurse, schedule/reschedule/cancel a clinic appointment, or request a medication refill call: [phone]     You can also reach us by [employer]: [email or web address]    For refills, please call on Monday, 1 week before your medication runs out. The doctors are not always in clinic, so this gives us time to get your prescriptions ready.  Please let us know the name of the medication you are requesting a refill of.                                     Follow-ups after your visit        Who to contact     Please call your clinic at [phone] to:    Ask questions about your health    Make or cancel appointments    Discuss your medicines    Learn about your test results    Speak to your doctor            Additional Information About Your Visit        [employer] Collaboration Network Information     [employer] gives you secure access to your electronic health record. If you see a primary care provider, you can also send messages to your care team and make appointments. If you have questions, please call your primary care clinic.  If you do not have a primary care provider, please call [phone] and they will assist you.      [employer] is an electronic gateway that provides easy, online access to your medical records. With [employer], you can request a clinic appointment, read your test results, renew a prescription or  "communicate with your care team.     To access your existing account, please contact your [facility] or call [phone] for assistance.        Care EveryWhere ID     This is your Care EveryWhere ID. This could be used by other organizations to access your [place] medical records  [id number]        Your Vitals Were     Pulse Respirations Height BMI (Body Mass Index)          72 16 1.676 m (5' 6\") 23.57 kg/m2         Blood Pressure from Last 3 Encounters:   07/30/18 112/76   07/02/18 125/88   05/21/18 118/78    Weight from Last 3 Encounters:   07/30/18 66.2 kg (146 lb)   07/10/18 66.2 kg (146 lb)   07/02/18 66.2 kg (146 lb)              Today, you had the following     No orders found for display       Primary Care Provider Office Phone # Fax #    Sonya Beasley PA-C [phone]       [facility] [address]  [facility] [address]        Equal Access to Services     [facility]: Hadii aad ku hadasho Soomaali, waaxda luqadaha, qaybta kaalmada adeegyada, waxay idiin hayaan gianna womack . So [facility] [phone].    ATENCIÓN: Si habla español, tiene a watson disposición servicios gratuitos de asistencia lingüística. LlSamaritan Hospital [phone].    We comply with applicable federal civil rights laws and [place] laws. We do not discriminate on the basis of race, color, national origin, age, disability, sex, sexual orientation, or gender identity.            Thank you!     Thank you for choosing [facility] FOR COMPREHENSIVE PAIN MANAGEMENT  for your care. Our goal is always to provide you with excellent care. Hearing back from our patients is one way we can continue to improve our services. Please take a few minutes to complete the written survey that you may receive in the mail after your visit with us. Thank you!             Your Updated Medication List - Protect others around you: Learn how to safely use, store and throw away your medicines at " www.disposemymeds.org.          This list is accurate as of 7/30/18  9:46 AM.  Always use your most recent med list.                   Brand Name Dispense Instructions for use Diagnosis    albuterol 108 (90 Base) MCG/ACT Inhaler    PROAIR HFA/PROVENTIL HFA/VENTOLIN HFA     Inhale 2 puffs into the lungs as needed        amphetamine-dextroamphetamine 30 MG per 24 hr capsule    ADDERALL XR     Take 1 capsule by mouth as needed        Calcium-Magnesium-Zinc 333-133-5 MG Tabs per tablet      Take 1 tablet by mouth daily        cetirizine 10 MG tablet    zyrTEC     Take 10 mg by mouth        cholecalciferol 1000 UNIT tablet    vitamin D3     Take 1,000 Units by mouth daily        CHROMIUM PO      Take 1 tablet by mouth daily        cyclobenzaprine 10 MG tablet    FLEXERIL    30 tablet    Take 1 tablet (10 mg) by mouth nightly as needed for muscle spasms    Back muscle spasm       doxycycline 100 MG capsule    VIBRAMYCIN    28 capsule    Take 1 capsule (100 mg) by mouth 2 times daily 14 days.    Post laminectomy syndrome       metFORMIN 500 MG tablet    GLUCOPHAGE     Take 500 mg by mouth        ondansetron 4 MG ODT tab    ZOFRAN-ODT    20 tablet    Take 4mg - 8mg every 8 hours as needed    Nausea       * oxyCODONE-acetaminophen 7.5-325 MG per tablet    PERCOCET    20 tablet    Take 1 tablet by mouth every 6 hours as needed for severe pain    Lumbar radiculopathy       * oxyCODONE-acetaminophen  MG per tablet    PERCOCET    7 tablet    Take 1 tablet by mouth nightly as needed for severe pain Post-operative pain.    Post laminectomy syndrome       QC WOMENS DAILY MULTIvitamin Tabs      Take 1 tablet by mouth daily        topiramate 25 MG tablet    TOPAMAX    90 tablet    Take 25 mg in AM, and 50 mg in the Evening.    Neuropathic pain       traMADol 50 MG tablet    ULTRAM    15 tablet    Take 0.5-1 tablets (25-50 mg) by mouth every 6 hours as needed for severe pain    Neuropathic pain, Lumbar radiculopathy       *  Notice:  This list has 2 medication(s) that are the same as other medications prescribed for you. Read the directions carefully, and ask your doctor or other care provider to review them with you.

## 2018-07-30 NOTE — LETTER
"7/30/2018       RE: Phyllis Vela  [address] [facility] [address]     Dear Colleague,    Thank you for referring your patient, Phyllis Vela, to the [facility] FOR COMPREHENSIVE PAIN MANAGEMENT at [facility]. Please see a copy of my visit note below.    Date of visit: 7/30/2018    Chief complaint:   Chief Complaint   Patient presents with     Pain Management     Follow up-Implant 07/02/2018       Interval history:  Phyllis Vela is a 47 year old female last seen by my colleague, Dr. Felix Aponte, on 7/2/18 spinal cord stimulator implant with [employer] device.      She reports that she has complete alleviation of pain in lower extremities. She has noted some \"pulling\" sensation at generator site but questions if this is simply the healing process. She notes a similar sensation at bilateral upper/mid back. She denies paraesthesias, numbness, tingling.     Medications:  Current Outpatient Prescriptions   Medication Sig Dispense Refill     albuterol (PROAIR HFA/PROVENTIL HFA/VENTOLIN HFA) 108 (90 BASE) MCG/ACT Inhaler Inhale 2 puffs into the lungs as needed       amphetamine-dextroamphetamine (ADDERALL XR) 30 MG per 24 hr capsule Take 1 capsule by mouth as needed       Calcium-Magnesium-Zinc 333-133-5 MG TABS per tablet Take 1 tablet by mouth daily       cetirizine (ZYRTEC) 10 MG tablet Take 10 mg by mouth       cholecalciferol (VITAMIN D3) 1000 UNIT tablet Take 1,000 Units by mouth daily       CHROMIUM PO Take 1 tablet by mouth daily       cyclobenzaprine (FLEXERIL) 10 MG tablet Take 1 tablet (10 mg) by mouth nightly as needed for muscle spasms 30 tablet 6     doxycycline (VIBRAMYCIN) 100 MG capsule Take 1 capsule (100 mg) by mouth 2 times daily 14 days. 28 capsule 0     metFORMIN (GLUCOPHAGE) 500 MG tablet Take 500 mg by mouth       Multiple Vitamins-Minerals (QC WOMENS DAILY MULTIVITAMIN) TABS Take 1 tablet by mouth daily       ondansetron (ZOFRAN-ODT) 4 MG ODT " "tab Take 4mg - 8mg every 8 hours as needed 20 tablet 0     oxyCODONE-acetaminophen (PERCOCET)  MG per tablet Take 1 tablet by mouth nightly as needed for severe pain Post-operative pain. 7 tablet 0     oxyCODONE-acetaminophen (PERCOCET) 7.5-325 MG per tablet Take 1 tablet by mouth every 6 hours as needed for severe pain 20 tablet 0     topiramate (TOPAMAX) 25 MG tablet Take 25 mg in AM, and 50 mg in the Evening. 90 tablet 3     traMADol (ULTRAM) 50 MG tablet Take 0.5-1 tablets (25-50 mg) by mouth every 6 hours as needed for severe pain 15 tablet 0       Medical History: any changes in medical history since they were last seen? No    Review of Systems:  The 14 system ROS was reviewed from the intake questionnaire, results listed at end of note.     Physical Exam:  Blood pressure 112/76, pulse 72, resp. rate 16, height 1.676 m (5' 6\"), weight 66.2 kg (146 lb), not currently breastfeeding.  General: NAD  Gait: Stable .  Skin: Surgical well healed and fully epithelialized. No signs of infection: no redness, swelling, drainage.         Assessment:   Phyllis Vela is a 47 year old female who is seen at the pain clinic for 4-week post-operative spinal cord stimulator reimplantation.    Plan:  Discussed with patient that she should maintain activity restrictions for an additional 2-4 weeks. Return in six months, or as needed, for SCS optimization. I believe the sensation she is noting is likely due to myofascial disturbance from the implantation; if symptoms persist, patient instructed to call us in clinic.       Total time spent was 15 minutes, and more than 50% of face to face time was spent in counseling and/or coordination of care regarding plan of care.    AMIRAH Valencia, KYLERNP-BC  Pain Management  Department of Interventional Pain Management and Anesthesiology   [facility]        "

## 2018-07-30 NOTE — PROGRESS NOTES
"Date of visit: 7/30/2018    Chief complaint:   Chief Complaint   Patient presents with     Pain Management     Follow up-Implant 07/02/2018       Interval history:  Phyllis Vela is a 47 year old female last seen by my colleague, Dr. Felix Aponte, on 7/2/18 spinal cord stimulator implant with [employer] device.      She reports that she has complete alleviation of pain in lower extremities. She has noted some \"pulling\" sensation at generator site but questions if this is simply the healing process. She notes a similar sensation at bilateral upper/mid back. She denies paraesthesias, numbness, tingling.     Medications:  Current Outpatient Prescriptions   Medication Sig Dispense Refill     albuterol (PROAIR HFA/PROVENTIL HFA/VENTOLIN HFA) 108 (90 BASE) MCG/ACT Inhaler Inhale 2 puffs into the lungs as needed       amphetamine-dextroamphetamine (ADDERALL XR) 30 MG per 24 hr capsule Take 1 capsule by mouth as needed       Calcium-Magnesium-Zinc 333-133-5 MG TABS per tablet Take 1 tablet by mouth daily       cetirizine (ZYRTEC) 10 MG tablet Take 10 mg by mouth       cholecalciferol (VITAMIN D3) 1000 UNIT tablet Take 1,000 Units by mouth daily       CHROMIUM PO Take 1 tablet by mouth daily       cyclobenzaprine (FLEXERIL) 10 MG tablet Take 1 tablet (10 mg) by mouth nightly as needed for muscle spasms 30 tablet 6     doxycycline (VIBRAMYCIN) 100 MG capsule Take 1 capsule (100 mg) by mouth 2 times daily 14 days. 28 capsule 0     metFORMIN (GLUCOPHAGE) 500 MG tablet Take 500 mg by mouth       Multiple Vitamins-Minerals (QC WOMENS DAILY MULTIVITAMIN) TABS Take 1 tablet by mouth daily       ondansetron (ZOFRAN-ODT) 4 MG ODT tab Take 4mg - 8mg every 8 hours as needed 20 tablet 0     oxyCODONE-acetaminophen (PERCOCET)  MG per tablet Take 1 tablet by mouth nightly as needed for severe pain Post-operative pain. 7 tablet 0     oxyCODONE-acetaminophen (PERCOCET) 7.5-325 MG per tablet Take 1 tablet by mouth every 6 hours as " "needed for severe pain 20 tablet 0     topiramate (TOPAMAX) 25 MG tablet Take 25 mg in AM, and 50 mg in the Evening. 90 tablet 3     traMADol (ULTRAM) 50 MG tablet Take 0.5-1 tablets (25-50 mg) by mouth every 6 hours as needed for severe pain 15 tablet 0       Medical History: any changes in medical history since they were last seen? No    Review of Systems:  The 14 system ROS was reviewed from the intake questionnaire, results listed at end of note.     Physical Exam:  Blood pressure 112/76, pulse 72, resp. rate 16, height 1.676 m (5' 6\"), weight 66.2 kg (146 lb), not currently breastfeeding.  General: NAD  Gait: Stable.  Skin: Surgical well healed and fully epithelialized. No signs of infection: no redness, swelling, drainage.         Assessment:   Phyllis Vela is a 47 year old female who is seen at the pain clinic for 4-week post-operative spinal cord stimulator reimplantation.    Plan:  Discussed with patient that she should maintain activity restrictions for an additional 2-4 weeks. Return in six months, or as needed, for SCS optimization. I believe the sensation she is noting is likely due to myofascial disturbance from the implantation; if symptoms persist, patient instructed to call us in clinic.       Total time spent was 15 minutes, and more than 50% of face to face time was spent in counseling and/or coordination of care regarding plan of care.    AMIRAH Valencia, [facility] HospitalNP-BC  Pain Management  Department of Interventional Pain Management and Anesthesiology   [facility]        "

## 2018-08-02 NOTE — TELEPHONE ENCOUNTER
RNCC notified pt that prescription could be mailed to her or she could pick it up from the [facility].  Pt reported that she was not agreeable to picking up the prescription and declined it.  RNCC cancelled prescription order.    Adrianna Florence RN, BSN

## 2018-08-27 ENCOUNTER — TELEPHONE (OUTPATIENT)
Dept: ANESTHESIOLOGY | Facility: CLINIC | Age: 48
End: 2018-08-27

## 2018-08-27 DIAGNOSIS — Z96.89 S/P INSERTION OF SPINAL CORD STIMULATOR: Primary | ICD-10-CM

## 2018-08-27 NOTE — TELEPHONE ENCOUNTER
RNCC called back pt to discuss symptoms.  Verbal orders given by Meena Hilario for lumbar and thoracic xrays to f/u scs lead migration.  Pt assisted with scheduling f/u 8/28/18 at 0920 with APRN.  Pt instructed to arrive earlier to have xrays done before appointment.  Pt verbalized understanding.     Adrianna Florence RN, BSN

## 2018-08-28 ENCOUNTER — RADIANT APPOINTMENT (OUTPATIENT)
Dept: GENERAL RADIOLOGY | Facility: CLINIC | Age: 48
End: 2018-08-28
Payer: COMMERCIAL

## 2018-08-28 ENCOUNTER — OFFICE VISIT (OUTPATIENT)
Dept: ANESTHESIOLOGY | Facility: CLINIC | Age: 48
End: 2018-08-28
Payer: COMMERCIAL

## 2018-08-28 VITALS — SYSTOLIC BLOOD PRESSURE: 118 MMHG | OXYGEN SATURATION: 100 % | DIASTOLIC BLOOD PRESSURE: 80 MMHG | HEART RATE: 81 BPM

## 2018-08-28 DIAGNOSIS — Z96.89 S/P INSERTION OF SPINAL CORD STIMULATOR: Primary | ICD-10-CM

## 2018-08-28 DIAGNOSIS — Z96.89 S/P INSERTION OF SPINAL CORD STIMULATOR: ICD-10-CM

## 2018-08-28 DIAGNOSIS — G89.18 PAIN FOLLOWING SURGERY OR PROCEDURE: ICD-10-CM

## 2018-08-28 RX ORDER — LIDOCAINE 50 MG/G
1 OINTMENT TOPICAL 4 TIMES DAILY PRN
Qty: 50 G | Refills: 1 | Status: SHIPPED | OUTPATIENT
Start: 2018-08-28

## 2018-08-28 ASSESSMENT — ANXIETY QUESTIONNAIRES
GAD7 TOTAL SCORE: 1
1. FEELING NERVOUS, ANXIOUS, OR ON EDGE: NOT AT ALL
6. BECOMING EASILY ANNOYED OR IRRITABLE: SEVERAL DAYS
5. BEING SO RESTLESS THAT IT IS HARD TO SIT STILL: NOT AT ALL
GAD7 TOTAL SCORE: 1
7. FEELING AFRAID AS IF SOMETHING AWFUL MIGHT HAPPEN: NOT AT ALL
3. WORRYING TOO MUCH ABOUT DIFFERENT THINGS: NOT AT ALL
2. NOT BEING ABLE TO STOP OR CONTROL WORRYING: NOT AT ALL
7. FEELING AFRAID AS IF SOMETHING AWFUL MIGHT HAPPEN: NOT AT ALL
4. TROUBLE RELAXING: NOT AT ALL

## 2018-08-28 NOTE — LETTER
"8/28/2018       RE: Phyllis Vela  [address] [facility] [address]     Dear Colleague,    Thank you for referring your patient, Phyllis Vela, to the [facility]NAGEMENT at [facility]. Please see a copy of my visit note below.    Date of visit: 8/28/2018    Chief complaint:   Chief Complaint   Patient presents with     Pain Management     Follow up SCS, after recent fall.        Interval history:  Phyllis Vela is a 47 year old female last seen by me on 7/30/18 for 4 week post-op check following spinal cord stimulator implant with [employer] device.      On 8/6, she experienced a fall after tripping over her dog. She felt her back \"tweak\" and has continued to note a pinching sensation at the superior portion of her generator incision site as well as \"jolts\" directly superior to generator site. Her most concerning symptom is numbness greater in the left lower extremity vs right. She has no foot drop or weakness. She is concerned she re-herniated a disc and has further nerve compression. On further thought, patient is questioning if she's had the abnormal stimulation since time of 4-week reprogramming.     Thoracic and lumbar XR performed today; leads appear intact without migration.     Medications:  Current Outpatient Prescriptions   Medication Sig Dispense Refill     albuterol (PROAIR HFA/PROVENTIL HFA/VENTOLIN HFA) 108 (90 BASE) MCG/ACT Inhaler Inhale 2 puffs into the lungs as needed       amphetamine-dextroamphetamine (ADDERALL XR) 30 MG per 24 hr capsule Take 1 capsule by mouth as needed       Calcium-Magnesium-Zinc 333-133-5 MG TABS per tablet Take 1 tablet by mouth daily       cetirizine (ZYRTEC) 10 MG tablet Take 10 mg by mouth       cholecalciferol (VITAMIN D3) 1000 UNIT tablet Take 1,000 Units by mouth daily       CHROMIUM PO Take 1 tablet by mouth daily       cyclobenzaprine (FLEXERIL) 10 MG tablet Take 1 tablet (10 mg) by mouth " nightly as needed for muscle spasms 30 tablet 6     metFORMIN (GLUCOPHAGE) 500 MG tablet Take 500 mg by mouth       Multiple Vitamins-Minerals (QC WOMENS DAILY MULTIVITAMIN) TABS Take 1 tablet by mouth daily       topiramate (TOPAMAX) 25 MG tablet Take 25 mg in AM, and 50 mg in the Evening. 90 tablet 3     traMADol (ULTRAM) 50 MG tablet Take 0.5-1 tablets (25-50 mg) by mouth every 6 hours as needed for severe pain 15 tablet 0       Medical History: any changes in medical history since they were last seen? No    Review of Systems:  The 14 system ROS was reviewed from the intake questionnaire; results listed at end of note.     Physical Exam:  Blood pressure 118/80, pulse 81, SpO2 100 %, not currently breastfeeding.  General: NAD  Psych: Mood and affect appropriate.   Neuro: Alert, oriented x3.   MSK exam: Lumbar Spine:  Generator and lead incision sites are well healed without erythema, drainage, swelling; no tenderness over these sites.  Able to complete heel/toe walk without difficulty.   Strength 5/5 bilaterally: dorsiflexion, plantarflexion, hamstrings, quadriceps.  +2 dorsalis pedis and posterior tibial pulses; CMS intact.   Achilles reflexes 2+ bilaterally; patellar +1 b/l.   Negative straight leg raise.   Gait is slow, but symmetrical. No foot drop.       Assessment:   Phyllis Vela is a 47 year old female who is seen at the pain clinic for abnormal neuromodulation s/p SCS implant 7/2 with reprogramming 7/30. Leads intact without migration on radiographs.    Plan:  1. Interventions:   -Will assist with coordination with [employer] representative to adjust neuromodulation. If this does not resolve her reported symptoms, will consider lumbar CT/MRI.   2. Medication Management:   -Lidocaine 5% topical prescribed; apply qid prn to generator site.   3. Follow up:   -Return for stimulation revision with [employer] rep. Follow up in clinic as needed.     Total time spent was 20 minutes, and more than 50% of face to face  time was spent in counseling and/or coordination of care regarding plan of care.      Again, thank you for allowing me to participate in the care of your patient.      Sincerely,    AMIRAH Valencia CNP

## 2018-08-28 NOTE — PATIENT INSTRUCTIONS
1. Please contact the clinic if symptoms do not resolve after stimulator reprogramming.      Follow up: Nurse visit to meet with [employer] representative for stimulator reprogramming       To speak with a nurse, schedule/reschedule/cancel a clinic appointment, or request a medication refill call: [phone]     You can also reach us by Sa[employer]: [email or web address]    For refills, please call on Monday, 1 week before your medication runs out. The doctors are not always in clinic, so this gives us time to get your prescriptions ready.  Please let us know the name of the medication you are requesting a refill of.

## 2018-08-28 NOTE — MR AVS SNAPSHOT
After Visit Summary   8/28/2018    Phyllis Vela    MRN: [id number]           Patient Information     Date Of Birth          1970        Visit Information        Provider Department      8/28/2018 9:20 AM Meena Hilario APRN [facility] for Comprehensive Pain Management        Care Instructions    1. Please contact the clinic if symptoms do not resolve after stimulator reprogramming.      Follow up: Nurse visit to meet with [employer] representative for stimulator reprogramming       To speak with a nurse, schedule/reschedule/cancel a clinic appointment, or request a medication refill call: [phone]     You can also reach us by [employer]elloCut: [email or web address]    For refills, please call on Monday, 1 week before your medication runs out. The doctors are not always in clinic, so this gives us time to get your prescriptions ready.  Please let us know the name of the medication you are requesting a refill of.                                     Follow-ups after your visit        Your next 10 appointments already scheduled     Aug 28, 2018 10:00 AM CDT   XR LUMBAR SPINE 2/3 VIEWS with [address] [facility] ([facility])    [address] [place] [id number] [phone]           Please bring a list of your current medicines to your exam. (Include vitamins, minerals and over-thecounter medicines.) Leave your valuables at home.  Tell your doctor if there is a chance you may be pregnant.  You do not need to do anything special for this exam.            Aug 28, 2018 10:20 AM CDT   XR THORACIC SPINE 3 VIEWS with [address] [facility] ([facility])    [address] [place] [id number] [phone]           Please bring a list of your current medicines to your exam. (Include vitamins, minerals and over-thecounter medicines.) Leave your valuables at  home.  Tell your doctor if there is a chance you may be pregnant.  You do not need to do anything special for this exam.              Who to contact     Please call your clinic at [phone] to:    Ask questions about your health    Make or cancel appointments    Discuss your medicines    Learn about your test results    Speak to your doctor            Additional Information About Your Visit        [employer] Information     [employer] gives you secure access to your electronic health record. If you see a primary care provider, you can also send messages to your care team and make appointments. If you have questions, please call your primary care clinic.  If you do not have a primary care provider, please call [phone] and they will assist you.      [employer] is an electronic gateway that provides easy, online access to your medical records. With [employer], you can request a clinic appointment, read your test results, renew a prescription or communicate with your care team.     To access your existing account, please contact your [facility] or call [phone] for assistance.        Care EveryWhere ID     This is your Care EveryWhere ID. This could be used by other organizations to access your [facility] medical records  [id number]        Your Vitals Were     Pulse Pulse Oximetry                81 100%           Blood Pressure from Last 3 Encounters:   08/28/18 118/80   07/30/18 112/76   07/02/18 125/88    Weight from Last 3 Encounters:   07/30/18 66.2 kg (146 lb)   07/10/18 66.2 kg (146 lb)   07/02/18 66.2 kg (146 lb)              Today, you had the following     No orders found for display       Primary Care Provider Office Phone # Fax #    Sonya Beasley PA-C [phone]       [place] [address]  [facility] [address]        Equal Access to Services     ОЛЕГ TARANGO AH: gilles Hunt, janine stokes  fransisco mcculloughqian chenchohusam hutton'aan ah. So [facility] [phone].    ATENCIÓN: Si giancarlo sun, tiene a watson disposición servicios gratuitos de asistencia lingüística. Randall al [phone].    We comply with applicable federal civil rights laws and [place] laws. We do not discriminate on the basis of race, color, national origin, age, disability, sex, sexual orientation, or gender identity.            Thank you!     Thank you for choosing [facility] [[email or web address]] FOR COMPREHENSIVE PAIN MANAGEMENT  for your care. Our goal is always to provide you with excellent care. Hearing back from our patients is one way we can continue to improve our services. Please take a few minutes to complete the written survey that you may receive in the mail after your visit with us. Thank you!             Your Updated Medication List - Protect others around you: Learn how to safely use, store and throw away your medicines at www.disposemymeds.org.          This list is accurate as of 8/28/18  9:55 AM.  Always use your most recent med list.                   Brand Name Dispense Instructions for use Diagnosis    albuterol 108 (90 Base) MCG/ACT inhaler    PROAIR HFA/PROVENTIL HFA/VENTOLIN HFA     Inhale 2 puffs into the lungs as needed        amphetamine-dextroamphetamine 30 MG per 24 hr capsule    ADDERALL XR     Take 1 capsule by mouth as needed        Calcium-Magnesium-Zinc 333-133-5 MG Tabs per tablet      Take 1 tablet by mouth daily        cetirizine 10 MG tablet    zyrTEC     Take 10 mg by mouth        cholecalciferol 1000 UNIT tablet    vitamin D3     Take 1,000 Units by mouth daily        CHROMIUM PO      Take 1 tablet by mouth daily        cyclobenzaprine 10 MG tablet    FLEXERIL    30 tablet    Take 1 tablet (10 mg) by mouth nightly as needed for muscle spasms    Back muscle spasm       metFORMIN 500 MG tablet    GLUCOPHAGE     Take 500 mg by mouth        QC WOMENS DAILY MULTIvitamin Tabs      Take 1 tablet by mouth daily        topiramate 25  MG tablet    TOPAMAX    90 tablet    Take 25 mg in AM, and 50 mg in the Evening.    Neuropathic pain       traMADol 50 MG tablet    ULTRAM    15 tablet    Take 0.5-1 tablets (25-50 mg) by mouth every 6 hours as needed for severe pain    Neuropathic pain, Lumbar radiculopathy

## 2018-08-28 NOTE — PROGRESS NOTES
"Date of visit: 8/28/2018    Chief complaint:   Chief Complaint   Patient presents with     Pain Management     Follow up SCS, after recent fall.        Interval history:  Phyllis Vela is a 47 year old female last seen by me on 7/30/18 for 4 week post-op check following spinal cord stimulator implant with [employer] device.      On 8/6, she experienced a fall after tripping over her dog. She felt her back \"tweak\" and has continued to note a pinching sensation at the superior portion of her generator incision site as well as \"jolts\" directly superior to generator site. Her most concerning symptom is numbness greater in the left lower extremity vs right. She has no foot drop or weakness. She is concerned she re-herniated a disc and has further nerve compression. On further thought, patient is questioning if she's had the abnormal stimulation since time of 4-week reprogramming.     Thoracic and lumbar XR performed today; leads appear intact without migration.     Medications:  Current Outpatient Prescriptions   Medication Sig Dispense Refill     albuterol (PROAIR HFA/PROVENTIL HFA/VENTOLIN HFA) 108 (90 BASE) MCG/ACT Inhaler Inhale 2 puffs into the lungs as needed       amphetamine-dextroamphetamine (ADDERALL XR) 30 MG per 24 hr capsule Take 1 capsule by mouth as needed       Calcium-Magnesium-Zinc 333-133-5 MG TABS per tablet Take 1 tablet by mouth daily       cetirizine (ZYRTEC) 10 MG tablet Take 10 mg by mouth       cholecalciferol (VITAMIN D3) 1000 UNIT tablet Take 1,000 Units by mouth daily       CHROMIUM PO Take 1 tablet by mouth daily       cyclobenzaprine (FLEXERIL) 10 MG tablet Take 1 tablet (10 mg) by mouth nightly as needed for muscle spasms 30 tablet 6     metFORMIN (GLUCOPHAGE) 500 MG tablet Take 500 mg by mouth       Multiple Vitamins-Minerals (QC WOMENS DAILY MULTIVITAMIN) TABS Take 1 tablet by mouth daily       topiramate (TOPAMAX) 25 MG tablet Take 25 mg in AM, and 50 mg in the Evening. 90 tablet 3     " traMADol (ULTRAM) 50 MG tablet Take 0.5-1 tablets (25-50 mg) by mouth every 6 hours as needed for severe pain 15 tablet 0       Medical History: any changes in medical history since they were last seen? No    Review of Systems:  The 14 system ROS was reviewed from the intake questionnaire; results listed at end of note.     Physical Exam:  Blood pressure 118/80, pulse 81, SpO2 100 %, not currently breastfeeding.  General: NAD  Psych: Mood and affect appropriate.   Neuro: Alert, oriented x3.   MSK exam: Lumbar Spine:  Generator and lead incision sites are well healed without erythema, drainage, swelling; no tenderness over these sites.  Able to complete heel/toe walk without difficulty.   Strength 5/5 bilaterally: dorsiflexion, plantarflexion, hamstrings, quadriceps.  +2 dorsalis pedis and posterior tibial pulses; CMS intact.   Achilles reflexes 2+ bilaterally; patellar +1 b/l.   Negative straight leg raise.   Gait is slow, but symmetrical. No foot drop.       Assessment:   Phyllis Vela is a 47 year old female who is seen at the pain clinic for abnormal neuromodulation s/p SCS implant 7/2 with reprogramming 7/30. Leads intact without migration on radiographs.    Plan:  1. Interventions:   -Will assist with coordination with [employer] representative to adjust neuromodulation. If this does not resolve her reported symptoms, will consider lumbar CT/MRI.   2. Medication Management:   -Lidocaine 5% topical prescribed; apply qid prn to generator site.   3. Follow up:   -Return for stimulation revision with [employer] rep. Follow up in clinic as needed.     Total time spent was 20 minutes, and more than 50% of face to face time was spent in counseling and/or coordination of care regarding plan of care.    AMIRAH Valencia, JARRED-BC  Pain Management  Department of Interventional Pain Management and Anesthesiology   [facility]        Answers for HPI/ROS submitted by the patient on 8/28/2018   JESÚS 7 TOTAL SCORE: 1  PHQ-2  Score: 0

## 2018-08-29 ASSESSMENT — ANXIETY QUESTIONNAIRES: GAD7 TOTAL SCORE: 1

## 2018-09-05 ENCOUNTER — APPOINTMENT (OUTPATIENT)
Dept: ANESTHESIOLOGY | Facility: CLINIC | Age: 48
End: 2018-09-05
Payer: MEDICAID

## 2018-09-12 DIAGNOSIS — M54.16 LUMBAR RADICULOPATHY: ICD-10-CM

## 2018-09-12 DIAGNOSIS — M79.2 NEUROPATHIC PAIN: ICD-10-CM

## 2018-09-12 RX ORDER — TRAMADOL HYDROCHLORIDE 50 MG/1
25-50 TABLET ORAL
Qty: 10 TABLET | Refills: 0 | Status: SHIPPED | OUTPATIENT
Start: 2018-09-12 | End: 2019-02-19

## 2018-09-12 NOTE — TELEPHONE ENCOUNTER
Verbal orders given by Meena Hilario to refill tramadol 25-50mg at bedtime PRN, quantity 10 tablets.   checked and is appropriate. RNCC verified pt's preferred pharmacy.     Adrianna Florence RN, BSN

## 2018-09-18 ENCOUNTER — TRANSFERRED RECORDS (OUTPATIENT)
Dept: HEALTH INFORMATION MANAGEMENT | Facility: CLINIC | Age: 48
End: 2018-09-18

## 2019-01-25 ENCOUNTER — TELEPHONE (OUTPATIENT)
Dept: ANESTHESIOLOGY | Facility: CLINIC | Age: 49
End: 2019-01-25

## 2019-01-25 DIAGNOSIS — M62.830 BACK MUSCLE SPASM: ICD-10-CM

## 2019-01-25 RX ORDER — CYCLOBENZAPRINE HCL 10 MG
10 TABLET ORAL
Qty: 30 TABLET | Refills: 0 | Status: SHIPPED | OUTPATIENT
Start: 2019-01-25 | End: 2019-02-19

## 2019-01-25 NOTE — TELEPHONE ENCOUNTER
Health Call Center    Phone Message    May a detailed message be left on voicemail: yes    Reason for Call: Medication Refill Request    Has the patient contacted the pharmacy for the refill? Yes   Name of medication being requested: Flexeril  Provider who prescribed the medication: Dr. Aponte, Needs Meena to renew  Pharmacy: [facility] [address] [place] [id number], [phone]  Date medication is needed: ASAP, pt is out.  This is a change of pharmacy.        Action Taken: Message routed to:  [facility] ([facility] Pain

## 2019-01-25 NOTE — TELEPHONE ENCOUNTER
RNCC assisted pt with scheduling 6 month follow up for med check and refill.  2/19/19 at 1210.    Adrianna Florence, RN, BSN

## 2019-02-19 ENCOUNTER — OFFICE VISIT (OUTPATIENT)
Dept: ANESTHESIOLOGY | Facility: CLINIC | Age: 49
End: 2019-02-19
Payer: COMMERCIAL

## 2019-02-19 VITALS
RESPIRATION RATE: 16 BRPM | WEIGHT: 159 LBS | SYSTOLIC BLOOD PRESSURE: 133 MMHG | BODY MASS INDEX: 25.55 KG/M2 | HEART RATE: 81 BPM | DIASTOLIC BLOOD PRESSURE: 91 MMHG | HEIGHT: 66 IN

## 2019-02-19 DIAGNOSIS — Z96.89 S/P INSERTION OF SPINAL CORD STIMULATOR: Primary | ICD-10-CM

## 2019-02-19 DIAGNOSIS — M79.2 NEUROPATHIC PAIN: ICD-10-CM

## 2019-02-19 DIAGNOSIS — M62.830 BACK MUSCLE SPASM: ICD-10-CM

## 2019-02-19 DIAGNOSIS — M54.16 LUMBAR RADICULOPATHY: ICD-10-CM

## 2019-02-19 RX ORDER — TRAMADOL HYDROCHLORIDE 50 MG/1
25-50 TABLET ORAL
Qty: 10 TABLET | Refills: 0 | Status: SHIPPED | OUTPATIENT
Start: 2019-02-19 | End: 2019-10-31

## 2019-02-19 RX ORDER — CYCLOBENZAPRINE HCL 10 MG
10 TABLET ORAL
Qty: 90 TABLET | Refills: 1 | Status: SHIPPED | OUTPATIENT
Start: 2019-02-19 | End: 2019-09-05

## 2019-02-19 ASSESSMENT — MIFFLIN-ST. JEOR: SCORE: 1367.97

## 2019-02-19 ASSESSMENT — PAIN SCALES - GENERAL: PAINLEVEL: MODERATE PAIN (4)

## 2019-02-19 NOTE — PROGRESS NOTES
"Date of visit: 2/19/2019    Chief complaint:   Chief Complaint   Patient presents with     Pain Management     Follow up       Interval history:  Phyllis Vela is a 48 year old female last seen by me on 8/28/18. She is s/p spinal cord stimulator implant with [employer] device, placed 7/2/18. She reports neuromodulation is working well for her pain; her only noted pain complaint is at the generator site in which she will occasionally appreciate some \"twinges\" of pain. She is here today requesting refill of cyclobenzaprine which she uses only at bedtime. She has tried to discontinue Flexeril, but simply notes that she has improved low back pain with use. She has also attempted to discontinue topiramate but experienced recurrent right LE pain; she has resumed 25 mg at HS only. She has also noted some recent onset facial twitches/\"tics\" and has questioned if this may be related to the Topamax.   She also requests a refill of tramadol today, last prescribed #10 tablets in September which has lasted her until now. She states she will only take approximately every three-four weeks with increased physical activity.          Medications:  Current Outpatient Medications   Medication Sig Dispense Refill     albuterol (PROAIR HFA/PROVENTIL HFA/VENTOLIN HFA) 108 (90 BASE) MCG/ACT Inhaler Inhale 2 puffs into the lungs as needed       amphetamine-dextroamphetamine (ADDERALL XR) 30 MG per 24 hr capsule Take 1 capsule by mouth as needed       Calcium-Magnesium-Zinc 333-133-5 MG TABS per tablet Take 1 tablet by mouth daily       cetirizine (ZYRTEC) 10 MG tablet Take 10 mg by mouth       cholecalciferol (VITAMIN D3) 1000 UNIT tablet Take 1,000 Units by mouth daily       CHROMIUM PO Take 1 tablet by mouth daily       cyclobenzaprine (FLEXERIL) 10 MG tablet Take 1 tablet (10 mg) by mouth nightly as needed for muscle spasms 30 tablet 0     lidocaine (XYLOCAINE) 5 % ointment Apply 1 g topically 4 times daily as needed for moderate pain 50 " "g 1     metFORMIN (GLUCOPHAGE) 500 MG tablet Take 500 mg by mouth       Multiple Vitamins-Minerals (QC WOMENS DAILY MULTIVITAMIN) TABS Take 1 tablet by mouth daily       topiramate (TOPAMAX) 25 MG tablet Take 25 mg in AM, and 50 mg in the Evening. 90 tablet 3     traMADol (ULTRAM) 50 MG tablet Take 0.5-1 tablets (25-50 mg) by mouth nightly as needed for severe pain 10 tablet 0       Medical History: any changes in medical history since they were last seen? No    Review of Systems:  The 14 system ROS was reviewed from the intake questionnaire; results listed at end of note.     Physical Exam:  Resp. rate 16, height 1.676 m (5' 6\"), weight 72.1 kg (159 lb), not currently breastfeeding.  General: In no apparent distress  Mental status: Normal mood and affect, pleasant  Neuro: Alert, oriented. No sensory deficits.   Head: Atraumatic, normocephalic  Eyes: Extra-ocular movements grossly intact, no scleral icterus  Neck: Supple, Range of motion full  Respiratory: Non-labored breathing; No respiratory distress  Skin: No rashes or lesions noted on exposed areas of skin  Msk: Generator palpable is palpable, but skin is intact; non-tender over site.     Assessment:   Phyllis Vela is a 48 year old female who is seen at the pain clinic for neuromodulation s/p SCS implant 7/2/18 with [employer] device.     Plan:  1. Interventions:   -Could consider anesthetic pocket injections (trigger points) around generator site; patient will follow up as needed.   2. Medication Management:   -Refill today of tramadol 50 mg, #10 tablets. Take 0.5-1 tablet PRN.   -Cyclobenzaprine 10 mg refilled today.   -Patient will continue topiramate 25 mg at HS; briefly discussed dosing 12.5 mg bid by splitting tablet. Patient will follow up in six months or keep us informed of any additional changes.     Total time spent was 20 minutes, and more than 50% of face to face time was spent in counseling and/or coordination of care regarding plan of " care.    AMIRAH Valencia, [facility]-BC  Pain Management  Department of Interventional Pain Management and Anesthesiology   [facility]

## 2019-02-19 NOTE — PATIENT INSTRUCTIONS
1. Tramadol Refilled- No changes made, continue as prescribed.     2. Flexeril refilled. 10 mg by mouth- Take 1 tab at bedtime, as needed.     Narcotic prescriptions given today:  traMADol (ULTRAM) 50 MG tablet- Take 0.5-1 tablets (25-50 mg) by mouth nightly as needed for severe pain. 10 tablets dispensed. You may refill on 2/19/19.     Follow up: 6 months in clinic with the provider.     To speak with a nurse, schedule/reschedule/cancel a clinic appointment, or request a medication refill call: [phone]     You can also reach us by [employer]: [email or web address]    For refills, please call on Monday, 1 week before your medication runs out. The doctors are not always in clinic, so this gives us time to get your prescriptions ready.  Please let us know the name of the medication you are requesting a refill of.

## 2019-02-19 NOTE — LETTER
"2/19/2019       RE: Phyllis Vela  [address] [place] [address]     Dear Colleague,    Thank you for referring your patient, Phyllis Vela, to the [facility] FOR COMPREHENSIVE PAIN MANAGEMENT at [facility]. Please see a copy of my visit note below.    Date of visit: 2/19/2019    Chief complaint:   Chief Complaint   Patient presents with     Pain Management     Follow up       Interval history:  Phyllis Vela is a 48 year old female last seen by me on 8/28/18. She is s/p spinal cord stimulator implant with [employer] device, placed 7/2/18. She reports neuromodulation is working well for her pain; her only noted pain complaint is at the generator site in which she will occasionally appreciate some \"twinges\" of pain. She is here today requesting refill of cyclobenzaprine which she uses only at bedtime. She has tried to discontinue Flexeril, but simply notes that she has improved low back pain with use. She has also attempted to discontinue topiramate but experienced recurrent right LE pain; she has resumed 25 mg at HS only. She has also noted some recent onset facial twitches/\"tics\" and has questioned if this may be related to the Topamax.   She also requests a refill of tramadol today, last prescribed #10 tablets in September which has lasted her until now. She states she will only take approximately every three-four weeks with increased physical activity.           Medications:  Current Outpatient Medications   Medication Sig Dispense Refill     albuterol (PROAIR HFA/PROVENTIL HFA/VENTOLIN HFA) 108 (90 BASE) MCG/ACT Inhaler Inhale 2 puffs into the lungs as needed       amphetamine-dextroamphetamine (ADDERALL XR) 30 MG per 24 hr capsule Take 1 capsule by mouth as needed       Calcium-Magnesium-Zinc 333-133-5 MG TABS per tablet Take 1 tablet by mouth daily       cetirizine (ZYRTEC) 10 MG tablet Take 10 mg by mouth       cholecalciferol (VITAMIN D3) 1000 UNIT tablet " "Take 1,000 Units by mouth daily       CHROMIUM PO Take 1 tablet by mouth daily       cyclobenzaprine (FLEXERIL) 10 MG tablet Take 1 tablet (10 mg) by mouth nightly as needed for muscle spasms 30 tablet 0     lidocaine (XYLOCAINE) 5 % ointment Apply 1 g topically 4 times daily as needed for moderate pain 50 g 1     metFORMIN (GLUCOPHAGE) 500 MG tablet Take 500 mg by mouth       Multiple Vitamins-Minerals (QC WOMENS DAILY MULTIVITAMIN) TABS Take 1 tablet by mouth daily       topiramate (TOPAMAX) 25 MG tablet Take 25 mg in AM, and 50 mg in the Evening. 90 tablet 3     traMADol (ULTRAM) 50 MG tablet Take 0.5-1 tablets (25-50 mg) by mouth nightly as needed for severe pain 10 tablet 0       Medical History: any changes in medical history since they were last seen? No    Review of Systems:  The 14 system ROS was reviewed from the intake questionnaire; results listed at end of note.     Physical Exam:  Resp. rate 16, height 1.676 m (5' 6\"), weight 72.1 kg (159 lb), not currently breastfeeding.  General: In no apparent distress  Mental status: Normal mood and affect, pleasant  Neuro: Alert, oriented. No sensory deficits.   Head: Atraumatic, normocephalic  Eyes: Extra-ocular movements grossly intact, no scleral icterus  Neck: Supple, Range of motion full  Respiratory: Non-labored breathing; No respiratory distress  Skin: No rashes or lesions noted on exposed areas of skin  Msk: Generator palpable is palpable, but skin is intact; non-tender over site.     Assessment:   Phyllis Vela is a 48 year old female who is seen at the pain clinic for neuromodulation s/p SCS implant 7/2/18 with [employer] device.     Plan:  1. Interventions:   -Could consider anesthetic pocket injections (trigger points) around generator site; patient will follow up as needed.   2. Medication Management:   -Refill today of tramadol 50 mg, #10 tablets. Take 0.5-1 tablet PRN.   -Cyclobenzaprine 10 mg refilled today.   -Patient will continue topiramate 25 mg " at HS; briefly discussed dosing 12.5 mg bid by splitting tablet. Patient will follow up in six months or keep us informed of any additional changes.     Total time spent was 20 minutes, and more than 50% of face to face time was spent in counseling and/or coordination of care regarding plan of care.    AMIRAH Valencia, [facility]-BC  Pain Management  Department of Interventional Pain Management and Anesthesiology   [facility]

## 2019-03-29 ENCOUNTER — HOSPITAL ENCOUNTER (OUTPATIENT)
Dept: MAMMOGRAPHY | Facility: CLINIC | Age: 49
End: 2019-03-29
Attending: PHYSICIAN ASSISTANT
Payer: COMMERCIAL

## 2019-03-29 ENCOUNTER — HOSPITAL ENCOUNTER (OUTPATIENT)
Dept: MAMMOGRAPHY | Facility: CLINIC | Age: 49
Discharge: HOME | End: 2019-03-29
Attending: PHYSICIAN ASSISTANT | Admitting: PHYSICIAN ASSISTANT
Payer: COMMERCIAL

## 2019-03-29 DIAGNOSIS — Z80.3 FAMILY HISTORY OF BREAST CANCER IN FEMALE: ICD-10-CM

## 2019-03-29 DIAGNOSIS — N64.4 BREAST TENDERNESS: ICD-10-CM

## 2019-03-29 DIAGNOSIS — N63.10 LUMP OF RIGHT BREAST: ICD-10-CM

## 2019-03-29 PROCEDURE — G0279 TOMOSYNTHESIS, MAMMO: HCPCS

## 2019-03-29 PROCEDURE — 76642 ULTRASOUND BREAST LIMITED: CPT | Mod: RT

## 2019-03-29 PROCEDURE — 77066 DX MAMMO INCL CAD BI: CPT

## 2019-09-05 ENCOUNTER — TELEPHONE (OUTPATIENT)
Dept: ANESTHESIOLOGY | Facility: CLINIC | Age: 49
End: 2019-09-05

## 2019-09-05 DIAGNOSIS — M62.830 BACK MUSCLE SPASM: ICD-10-CM

## 2019-09-05 RX ORDER — CYCLOBENZAPRINE HCL 10 MG
10 TABLET ORAL
Qty: 30 TABLET | Refills: 0 | Status: SHIPPED | OUTPATIENT
Start: 2019-09-05 | End: 2019-10-04

## 2019-09-05 NOTE — TELEPHONE ENCOUNTER
I called and left a voice message for the patient informing her that we refilled her Flexeril for 30 tablets. You do need to follow up with either us or ask your PCP if they can take over prescribing for you.    Meena Hilario is no longer a provider with our clinic so you will need to follow up with a different provider.    If you have any questions or concerns or would like to schedule a clinic appointment you can call [phone].    Sheyla Swanson, CMA

## 2019-09-05 NOTE — TELEPHONE ENCOUNTER
Flexeril refilled for 30 tablets only.  Pt needs appointment for medication management.     Adrianna Florence, RN, BSN

## 2019-10-03 ENCOUNTER — TELEPHONE (OUTPATIENT)
Dept: ANESTHESIOLOGY | Facility: CLINIC | Age: 49
End: 2019-10-03

## 2019-10-03 DIAGNOSIS — M79.2 NEUROPATHIC PAIN: ICD-10-CM

## 2019-10-03 DIAGNOSIS — M62.830 BACK MUSCLE SPASM: ICD-10-CM

## 2019-10-03 NOTE — TELEPHONE ENCOUNTER
[facility] Call Center    Phone Message    May a detailed message be left on voicemail: no    Reason for Call: Other: Pt wants a call back to discuss some issues and getting f/u care in clinic. Pt says she needs refills on her meds and can't get any more meds without being seen. First available was scheduled on 12/4/19. Pt also says she has pain from her leads attached to her spinal cord stimulator; she is concerned the leads shifted. Please call Pt back.     Action Taken: Message routed to:  [facility]

## 2019-10-04 RX ORDER — CYCLOBENZAPRINE HCL 10 MG
10 TABLET ORAL
Qty: 30 TABLET | Refills: 1 | Status: SHIPPED | OUTPATIENT
Start: 2019-10-04 | End: 2019-10-31

## 2019-10-04 RX ORDER — TOPIRAMATE 25 MG/1
TABLET, FILM COATED ORAL
Qty: 90 TABLET | Refills: 1 | Status: SHIPPED | OUTPATIENT
Start: 2019-10-04 | End: 2019-10-31

## 2019-10-07 ENCOUNTER — TRANSFERRED RECORDS (OUTPATIENT)
Dept: HEALTH INFORMATION MANAGEMENT | Facility: CLINIC | Age: 49
End: 2019-10-07

## 2019-10-07 NOTE — TELEPHONE ENCOUNTER
RNCC called back pt to discuss questions about care.  She reports that she is having new onset pain in mid-back area.  She reports recent possible exposure to fungus that may be causes lung/respiratory issues.  She will be seeing her PCP week of 10/7/19 for evaluation.      Pt also questioning functionality of SCS.  She will come in for a nurse visit to meet with an [employer] representative if the pain persists after evaluation for respiratory issues.  I emailed Tarik with [employer] and requested someone reach out to pt to discuss programming.  Pt plans to update RNCC later in the week of 10/7/19 with status of lung issues and possible SCS issues.     Adrianna lForence, RN, BSN

## 2019-10-16 ENCOUNTER — TRANSFERRED RECORDS (OUTPATIENT)
Dept: HEALTH INFORMATION MANAGEMENT | Facility: CLINIC | Age: 49
End: 2019-10-16

## 2019-10-21 ENCOUNTER — TELEPHONE (OUTPATIENT)
Dept: ANESTHESIOLOGY | Facility: CLINIC | Age: 49
End: 2019-10-21

## 2019-10-21 NOTE — TELEPHONE ENCOUNTER
Voice message received from pt with update.  She reports that lung issues/infection ruled out, though her pain in mid to upper back is not resolved.  Pt was evaluated at outside facility where imaging was obtained.  She reports that CHELI was signed to release records to [facility].  Pt also reports that she feels like her mid to upper back area, possibly near SCS leads, is having muscle spasms, and the area might possibly have swelling and is warm to the touch.      Pt was scheduled to see Dr. Cam 10/31/19 at 0820.  Pt declined having Morin present for visit.  She was advised to call back the clinic with any questions, concerns, or changes in status.      RNCC called to speak with Dr. Ochoa regarding concerns for pt's report of swelling and warmth in mid to upper back area.  Per provider, based on pt's recent evaluation for pulmonary concerns, which pt reports was all negative, pt is okay to come in next week.      Adrianna Florence, RN, BSN

## 2019-10-31 ENCOUNTER — OFFICE VISIT (OUTPATIENT)
Dept: ANESTHESIOLOGY | Facility: CLINIC | Age: 49
End: 2019-10-31
Payer: COMMERCIAL

## 2019-10-31 ENCOUNTER — ANCILLARY PROCEDURE (OUTPATIENT)
Dept: GENERAL RADIOLOGY | Facility: CLINIC | Age: 49
End: 2019-10-31
Attending: ANESTHESIOLOGY
Payer: COMMERCIAL

## 2019-10-31 VITALS
WEIGHT: 164 LBS | RESPIRATION RATE: 16 BRPM | BODY MASS INDEX: 26.36 KG/M2 | DIASTOLIC BLOOD PRESSURE: 95 MMHG | HEART RATE: 84 BPM | SYSTOLIC BLOOD PRESSURE: 152 MMHG | HEIGHT: 66 IN

## 2019-10-31 DIAGNOSIS — M62.830 BACK MUSCLE SPASM: ICD-10-CM

## 2019-10-31 DIAGNOSIS — M54.16 LUMBAR RADICULOPATHY: ICD-10-CM

## 2019-10-31 DIAGNOSIS — Z96.89 S/P INSERTION OF SPINAL CORD STIMULATOR: ICD-10-CM

## 2019-10-31 DIAGNOSIS — M79.2 NEUROPATHIC PAIN: ICD-10-CM

## 2019-10-31 DIAGNOSIS — Z96.89 S/P INSERTION OF SPINAL CORD STIMULATOR: Primary | ICD-10-CM

## 2019-10-31 RX ORDER — TOPIRAMATE 25 MG/1
TABLET, FILM COATED ORAL
Qty: 90 TABLET | Refills: 1 | Status: SHIPPED | OUTPATIENT
Start: 2019-10-31 | End: 2020-05-21

## 2019-10-31 RX ORDER — CYCLOBENZAPRINE HCL 10 MG
10 TABLET ORAL
Qty: 30 TABLET | Refills: 1 | Status: SHIPPED | OUTPATIENT
Start: 2019-10-31 | End: 2020-01-23

## 2019-10-31 RX ORDER — TRAMADOL HYDROCHLORIDE 50 MG/1
25-50 TABLET ORAL
Qty: 10 TABLET | Refills: 0 | Status: SHIPPED | OUTPATIENT
Start: 2019-10-31 | End: 2020-05-21

## 2019-10-31 ASSESSMENT — PAIN SCALES - GENERAL: PAINLEVEL: MILD PAIN (3)

## 2019-10-31 ASSESSMENT — MIFFLIN-ST. JEOR: SCORE: 1385.65

## 2019-10-31 NOTE — PATIENT INSTRUCTIONS
1. Xray of Thoracic spine.     IMAGING SERVICES HOURS:    All imaging modalities are available from 7 a.m. - 9 p.m. Monday through Friday  X-ray, CT, MRI, and General Ultrasound appointments are available from 7 a.m. -3:30 p.m. on Saturdays  X-ray, CT and MRI appointments are available from 8 a.m. - 4:30 p.m. on Sundays  Please call [phone] to schedule imaging exams      2. Tramadol- 50 mg.  10 tablets dispensed.   Take 1 tablet as needed for severe pain.      3. Release of Information- For Dr. Cam to review your CT Scan.      Follow up: 2 months in clinic.       To speak with a nurse, schedule/reschedule/cancel a clinic appointment, or request a medication refill call: [phone]     You can also reach us by [employer]: [email or web address]    For refills, please call on Monday, 1 week before your medication runs out. The doctors are not always in clinic, so this gives us time to get your prescriptions ready.  Please let us know the name of the medication you are requesting a refill of.

## 2019-10-31 NOTE — LETTER
RE: Phyllis Vela  [address] [place] [address]     Dear Colleague,    Thank you for referring your patient, Phyllis Vela, to the [facility] FOR COMPREHENSIVE PAIN MANAGEMENT at [facility]. Please see a copy of my visit note below.    [facility] for Comprehensive Chronic Pain Management : Progress Note    Date of visit: 11/6/2019    Interval history:  Phyllis Vela is a 49 year old female, who is known our clinic for multifocal pain.  She was  Last seen on 2/9/2019 by Meena Hilario NP. She is s/p spinal cord stimulator implant with [employer] device, placed 7/2/18. She  states neuromodulation is working well for her pain; sometimes she has pain at the generator site.  She also reports mid back pain.  She is worried about migration of the spinal cord stimulator leads since her pain is located near the stimulator leads.  She is also reporting muscle spasm, sometimes swelling and warmth to touch.  The patient is also requesting a refill of cyclobenzaprine, topiramate and tramadol.    Recommendations/plan at the last visit included:  1. Interventions:   -Could consider anesthetic pocket injections (trigger points) around generator site; patient will follow up as needed.   2. Medication Management:   -Refill today of tramadol 50 mg, #10 tablets. Take 0.5-1 tablet PRN.   -Cyclobenzaprine 10 mg refilled today.   -Patient will continue topiramate 25 mg at HS; briefly discussed dosing 12.5 mg bid by splitting tablet. Patient will follow up in six months or keep us informed of any additional changes.     [place] Prescription Monitoring Program:   Reviewed. No concerns     Review of Systems:  The 14 system ROS was reviewed and was negative except what is documented above and as follows.  Any bowel or bladder problems: none  Mood: okay    Physical Exam:  Vitals:    10/31/19 0844   BP: (!) 152/95   Pulse: 84   Resp: 16   Weight: 74.4 kg  "(164 lb)   Height: 1.676 m (5' 6\")       General: Awake in no apparent distress.   Eyes: Sclerae are anicteric. PERRLA, EOMI   Neck: supple, no masses.   Lungs: unlabored.   Heart: regular rate and rhythm   Abdomen: soft non tender.  Extremities: Pulses are well palpable, no peripheral edema.   Musculoskeletal: 5/5 muscle strength in all extremities.   Neurologic exam:Sensation intact throughout all dermatomes bilateral upper extremities and lower extremities  Psychiatric; Normal affect.   Skin: Warm and Dry.    Medications:  Current Outpatient Medications   Medication Sig Dispense Refill     albuterol (PROAIR HFA/PROVENTIL HFA/VENTOLIN HFA) 108 (90 BASE) MCG/ACT Inhaler Inhale 2 puffs into the lungs as needed       amphetamine-dextroamphetamine (ADDERALL XR) 30 MG per 24 hr capsule Take 1 capsule by mouth as needed       Calcium-Magnesium-Zinc 333-133-5 MG TABS per tablet Take 1 tablet by mouth daily       cetirizine (ZYRTEC) 10 MG tablet Take 10 mg by mouth       cholecalciferol (VITAMIN D3) 1000 UNIT tablet Take 1,000 Units by mouth daily       CHROMIUM PO Take 1 tablet by mouth daily       cyclobenzaprine (FLEXERIL) 10 MG tablet Take 1 tablet (10 mg) by mouth nightly as needed for muscle spasms 30 tablet 1     lidocaine (XYLOCAINE) 5 % ointment Apply 1 g topically 4 times daily as needed for moderate pain 50 g 1     metFORMIN (GLUCOPHAGE) 500 MG tablet Take 500 mg by mouth       Multiple Vitamins-Minerals (QC WOMENS DAILY MULTIVITAMIN) TABS Take 1 tablet by mouth daily       topiramate (TOPAMAX) 25 MG tablet Take 25 mg in AM, and 50 mg in the Evening. 90 tablet 1     traMADol (ULTRAM) 50 MG tablet Take 0.5-1 tablets (25-50 mg) by mouth nightly as needed for severe pain 10 tablet 0       Analgesic Medications:   Medications related to Pain Management (From now, onward)    Start     Dose/Rate Route Frequency Ordered Stop    10/31/19 0000  cyclobenzaprine (FLEXERIL) 10 MG tablet      10 mg Oral AT BEDTIME PRN " 10/31/19 0920      10/31/19 0000  topiramate (TOPAMAX) 25 MG tablet         10/31/19 0920      10/31/19 0000  traMADol (ULTRAM) 50 MG tablet      25-50 mg Oral AT BEDTIME PRN 10/31/19 0920               LABORATORY VALUES:   Recent Labs   Lab Test 12/09/14  2250      POTASSIUM 4.0   CHLORIDE 103   CO2 29   ANIONGAP 3   GLC 89   BUN 8   CR 0.82   SUSAN 9.0       CBC RESULTS:   Recent Labs   Lab Test 12/09/14  2250   WBC 8.2   RBC 4.38   HGB 13.4   HCT 40.0   MCV 91   MCH 30.6   MCHC 33.5   RDW 12.1          Most Recent 3 INR's:No lab results found.      ASSESSMENT:    Status post spinal cord stimulator implant with [employer] device  New midthoracic back pain  Generalized pain    PLAN:    1. Medications.     -Continue  Flexeril 10 mg nightly as needed  -Apply Lidocaine Patch 5% to intact skin to cover the most painful area. Apply the prescribed number of patches (maximum of 3), only once for up to 12 hours.   -Topamax 25 mg a.m. and 50 mg at the evening  -Tramadol 25 to 50 mg nightly as needed.  10 tablets dispensed.    2. Interventional procedures:    None at this time.     3. Labs and imaging: We will order x-ray of the thoracic and spine 2 views to evaluate lead migration    4. Rehab:  Advised to perform home exercise using [employer] videos  [email or web address].  The patient is also encouraged to stay active as tolerated.     5. Psychology: No current needs.     6. Integrated medicine: We will discuss about acupuncture therapy in the future    7. Disposition: Follow-up 6 months    Assessment will be ongoing with changes in treatment as indicated.  Benefits/risks/alternatives to treatment have been reviewed and the patient has been instructed to contact this office if they have any questions or concerns.  This plan of care has been discussed with the patient and the patient is in agreement.     Nando Cam MD, PHD

## 2019-11-06 ENCOUNTER — HEALTH MAINTENANCE LETTER (OUTPATIENT)
Age: 49
End: 2019-11-06

## 2019-11-06 NOTE — PROGRESS NOTES
"[facility] : Progress Note    Date of visit: 11/6/2019    Interval history:  Phyllis Vela is a 49 year old female, who is known our clinic for multifocal pain.  She was  Last seen on 2/9/2019 by Meena Hilario NP. She is s/p spinal cord stimulator implant with [employer] device, placed 7/2/18. She states neuromodulation is working well for her pain; sometimes she has pain at the generator site.  She also reports mid back pain.  She is worried about migration of the spinal cord stimulator leads since her pain is located near the stimulator leads.  She is also reporting muscle spasm, sometimes swelling and warmth to touch.  The patient is also requesting a refill of cyclobenzaprine, topiramate and tramadol.      Recommendations/plan at the last visit included:  1. Interventions:   -Could consider anesthetic pocket injections (trigger points) around generator site; patient will follow up as needed.   2. Medication Management:   -Refill today of tramadol 50 mg, #10 tablets. Take 0.5-1 tablet PRN.   -Cyclobenzaprine 10 mg refilled today.   -Patient will continue topiramate 25 mg at HS; briefly discussed dosing 12.5 mg bid by splitting tablet. Patient will follow up in six months or keep us informed of any additional changes.         [place] Prescription Monitoring Program:   Reviewed. No concerns     Review of Systems:  The 14 system ROS was reviewed and was negative except what is documented above and as follows.  Any bowel or bladder problems: none  Mood: okay    Physical Exam:  Vitals:    10/31/19 0844   BP: (!) 152/95   Pulse: 84   Resp: 16   Weight: 74.4 kg (164 lb)   Height: 1.676 m (5' 6\")       General: Awake in no apparent distress.   Eyes: Sclerae are anicteric. PERRLA, EOMI   Neck: supple, no masses.   Lungs: unlabored.   Heart: regular rate and rhythm   Abdomen: soft non tender.  Extremities: Pulses are well palpable, no peripheral " edema.   Musculoskeletal: 5/5 muscle strength in all extremities.   Neurologic exam:Sensation intact throughout all dermatomes bilateral upper extremities and lower extremities  Psychiatric; Normal affect.   Skin: Warm and Dry.    Medications:  Current Outpatient Medications   Medication Sig Dispense Refill     albuterol (PROAIR HFA/PROVENTIL HFA/VENTOLIN HFA) 108 (90 BASE) MCG/ACT Inhaler Inhale 2 puffs into the lungs as needed       amphetamine-dextroamphetamine (ADDERALL XR) 30 MG per 24 hr capsule Take 1 capsule by mouth as needed       Calcium-Magnesium-Zinc 333-133-5 MG TABS per tablet Take 1 tablet by mouth daily       cetirizine (ZYRTEC) 10 MG tablet Take 10 mg by mouth       cholecalciferol (VITAMIN D3) 1000 UNIT tablet Take 1,000 Units by mouth daily       CHROMIUM PO Take 1 tablet by mouth daily       cyclobenzaprine (FLEXERIL) 10 MG tablet Take 1 tablet (10 mg) by mouth nightly as needed for muscle spasms 30 tablet 1     lidocaine (XYLOCAINE) 5 % ointment Apply 1 g topically 4 times daily as needed for moderate pain 50 g 1     metFORMIN (GLUCOPHAGE) 500 MG tablet Take 500 mg by mouth       Multiple Vitamins-Minerals (QC WOMENS DAILY MULTIVITAMIN) TABS Take 1 tablet by mouth daily       topiramate (TOPAMAX) 25 MG tablet Take 25 mg in AM, and 50 mg in the Evening. 90 tablet 1     traMADol (ULTRAM) 50 MG tablet Take 0.5-1 tablets (25-50 mg) by mouth nightly as needed for severe pain 10 tablet 0       Analgesic Medications:   Medications related to Pain Management (From now, onward)    Start     Dose/Rate Route Frequency Ordered Stop    10/31/19 0000  cyclobenzaprine (FLEXERIL) 10 MG tablet      10 mg Oral AT BEDTIME PRN 10/31/19 0920      10/31/19 0000  topiramate (TOPAMAX) 25 MG tablet         10/31/19 0920      10/31/19 0000  traMADol (ULTRAM) 50 MG tablet      25-50 mg Oral AT BEDTIME PRN 10/31/19 0920               LABORATORY VALUES:   Recent Labs   Lab Test 12/09/14  2250      POTASSIUM 4.0    CHLORIDE 103   CO2 29   ANIONGAP 3   GLC 89   BUN 8   CR 0.82   SUSAN 9.0       CBC RESULTS:   Recent Labs   Lab Test 12/09/14  2250   WBC 8.2   RBC 4.38   HGB 13.4   HCT 40.0   MCV 91   MCH 30.6   MCHC 33.5   RDW 12.1          Most Recent 3 INR's:No lab results found.        ASSESSMENT:    Status post spinal cord stimulator implant with [employer] device  New midthoracic back pain  Generalized pain    PLAN:    1. Medications.     -Continue  Flexeril 10 mg nightly as needed  -Apply Lidocaine Patch 5% to intact skin to cover the most painful area. Apply the prescribed number of patches (maximum of 3), only once for up to 12 hours.   -Topamax 25 mg a.m. and 50 mg at the evening  -Tramadol 25 to 50 mg nightly as needed.  10 tablets dispensed.    2. Interventional procedures:    None at this time.     3. Labs and imaging: We will order x-ray of the thoracic and spine 2 views to evaluate lead migration    4. Rehab:  Advised to perform home exercise using [employer] videos  [email or web address].  The patient is also encouraged to stay active as tolerated.     5. Psychology: No current needs.     6. Integrated medicine: We will discuss about acupuncture therapy in the future    7. Disposition: Follow-up 6 months    Assessment will be ongoing with changes in treatment as indicated.  Benefits/risks/alternatives to treatment have been reviewed and the patient has been instructed to contact this office if they have any questions or concerns.  This plan of care has been discussed with the patient and the patient is in agreement.     Nando Cam MD, PHD

## 2020-01-22 DIAGNOSIS — M62.830 BACK MUSCLE SPASM: ICD-10-CM

## 2020-01-23 DIAGNOSIS — M62.830 BACK MUSCLE SPASM: ICD-10-CM

## 2020-01-23 RX ORDER — CYCLOBENZAPRINE HCL 10 MG
TABLET ORAL
Qty: 30 TABLET | Refills: 1 | OUTPATIENT
Start: 2020-01-23

## 2020-01-23 RX ORDER — CYCLOBENZAPRINE HCL 10 MG
TABLET ORAL
Qty: 30 TABLET | Refills: 1 | Status: SHIPPED | OUTPATIENT
Start: 2020-01-23 | End: 2020-04-01

## 2020-02-27 NOTE — TELEPHONE ENCOUNTER
INR (no units)   Date Value   01/06/2020 1.3     INR-POC (no units)   Date Value   01/30/2020 2.1     INR is within goal range. Reviewed INR result and patient findings with Dr Ricky Jameson and verbal order received. Spoke with patient who is in clinic. Current warfarin dosing verified with patient, informed of INR result and obtained patient findings. Patient findings are all negative. Patient instructed that warfarin dose will be maintained. Discussed return date for next INR. See [employer] Tracker for details. Patient was instructed to contact the clinic with any unusual bleeding or bruising, any changes in medications, diet, health status, lifestyle, or any other changes, questions or concerns. Patient verbalized understanding of all discussed. Verbal order from Dr. Aponte. He is not agreeable for pt to increase their Flexeril from 5 mg to 10 mg. He recommended that pt increase their Topamax to 50 mg BID. Pt verbalized understanding of instructions.     Buffy Benedict LPN

## 2020-03-31 DIAGNOSIS — M62.830 BACK MUSCLE SPASM: ICD-10-CM

## 2020-04-01 RX ORDER — CYCLOBENZAPRINE HCL 10 MG
TABLET ORAL
Qty: 30 TABLET | Refills: 1 | Status: SHIPPED | OUTPATIENT
Start: 2020-04-01 | End: 2020-05-19

## 2020-05-19 DIAGNOSIS — M62.830 BACK MUSCLE SPASM: ICD-10-CM

## 2020-05-19 RX ORDER — CYCLOBENZAPRINE HCL 10 MG
TABLET ORAL
Qty: 30 TABLET | Refills: 1 | Status: SHIPPED | OUTPATIENT
Start: 2020-05-19 | End: 2020-05-21

## 2020-05-20 NOTE — TELEPHONE ENCOUNTER
RNCC received call from pt.  She is requesting refill of flexeril.  Pt was assisted with scheduling follow up with Dr. Cam for medication management 5/21/20 for a virtual visit.  Refills sent into pt's pharmacy.      Adrianna Florence RN, BSN

## 2020-05-21 ENCOUNTER — VIRTUAL VISIT (OUTPATIENT)
Dept: ANESTHESIOLOGY | Facility: CLINIC | Age: 50
End: 2020-05-21
Attending: ANESTHESIOLOGY
Payer: COMMERCIAL

## 2020-05-21 VITALS — HEIGHT: 66 IN | BODY MASS INDEX: 24.75 KG/M2 | WEIGHT: 154 LBS

## 2020-05-21 DIAGNOSIS — M62.830 BACK MUSCLE SPASM: ICD-10-CM

## 2020-05-21 DIAGNOSIS — M79.2 NEUROPATHIC PAIN: ICD-10-CM

## 2020-05-21 DIAGNOSIS — M54.16 LUMBAR RADICULOPATHY: ICD-10-CM

## 2020-05-21 RX ORDER — TRAMADOL HYDROCHLORIDE 50 MG/1
25-50 TABLET ORAL
Qty: 10 TABLET | Refills: 0 | Status: SHIPPED | OUTPATIENT
Start: 2020-06-21 | End: 2021-03-04

## 2020-05-21 RX ORDER — CYCLOBENZAPRINE HCL 10 MG
TABLET ORAL
Qty: 30 TABLET | Refills: 1 | Status: SHIPPED | OUTPATIENT
Start: 2020-05-21 | End: 2020-07-24

## 2020-05-21 ASSESSMENT — PAIN SCALES - GENERAL: PAINLEVEL: MODERATE PAIN (4)

## 2020-05-21 ASSESSMENT — MIFFLIN-ST. JEOR: SCORE: 1342.54

## 2020-05-21 NOTE — PATIENT INSTRUCTIONS
Medications:    Continue Flexeril 10 mg at bedtime as needed.     Tramadol 50 mg- Take 25-50 mg As needed. 10 tablets dispensed. (Every 6 months)    When calling in for refills for your opioid medication- You MUST call (Or MyChart) the clinic DIRECTLY and at least 7 days before you are needing your medication refilled.    Imaging:    Thoracic Spine X ray ordered.     IMAGING SERVICES HOURS:    All imaging modalities are available from 7 a.m. - 9 p.m. Monday through Friday  X-ray, CT, MRI, and General Ultrasound appointments are available from 7 a.m. -3:30 p.m. on Saturdays  X-ray, CT and MRI appointments are available from 8 a.m. - 4:30 p.m. on Sundays  Please call [phone] to schedule imaging exams      Treatment planning:    Dr. Cam recommend you continue with Home Exercise therapy. You can review this website for exercise ideas.   [email or web address].    Consider Acupuncture, let our office know if you are interested in a Referral.       Recommended Follow up:  6 months or earlier if indicated.           To speak with a nurse, schedule/reschedule/cancel a clinic appointment, or request a medication refill call: [phone]    You can also reach us by xPeerient: [email or web address]    Please provide the clinic with a minium of 1 week notice, on all prescription refills.

## 2020-05-21 NOTE — PROGRESS NOTES
"Phyllis Vela is a 49 year old female who is being evaluated via a billable video visit.      The patient has been notified of following:     \"This video visit will be conducted via a call between you and your physician/provider. We have found that certain health care needs can be provided without the need for an in-person physical exam.  This service lets us provide the care you need with a video conversation.  If a prescription is necessary we can send it directly to your pharmacy.  If lab work is needed we can place an order for that and you can then stop by our lab to have the test done at a later time.    Video visits are billed at different rates depending on your insurance coverage.  Please reach out to your insurance provider with any questions.    If during the course of the call the physician/provider feels a video visit is not appropriate, you will not be charged for this service.\"    Patient has given verbal consent for Video visit? Yes    How would you like to obtain your AVS? Kyra    Patient would like the video invitation sent by: Send to e-mail at: [email or web address]    Will anyone else be joining your video visit? No      Refill on Flexeril.     Tonny Soares LPN    Video-Visit Details    Type of service:  Video Visit    Video Start Time: 9:05  Video End Time: 9:25    Originating Location (pt. Location): Home    Distant Location (provider location):  [facility]     Platform used for Video Visit: AmAlli    Phyllis Vela is a 49 year old female, who is known to our clinic for multifocal pain.  She was last seen on 2/9/2019 by Meena Hilario NP. She is s/p spinal cord stimulator implant with [employer] device, placed 7/2/18. She states neuromodulation is working well for her pain.  She does not have any new complaints.  She occasionally has generalized body pain and muscle spasm for which she takes  tramadol 50 mg 1 tablet in  1 to 2 months.  In addition she takes " Flexeril 10 mg nightly.    ASSESSMENT:     Status post spinal cord stimulator implant with [employer] device  Generalized pain     PLAN:     1. Medications.      -Continue  Flexeril 10 mg nightly as needed  -Tramadol 50 mg 1 tablet 25 to 50 mg monthly as needed.  10 tablets dispensed with earliest refill date of 6/21/2020     2. Interventional procedures:     None at this time.      3. Labs and imaging:  None indicated     4. Rehab:  Advised to perform home exercise using [employer] videos  [email or web address].  The patient is also encouraged to stay active as tolerated.     5. Psychology: No current needs.      6. Integrated medicine: We will discuss about acupuncture therapy in the future     7. Disposition: Follow-up 6 months      Nando Cam MD, PhD    [facility]CER [address] [id number] [phone]  Dept: [phone]

## 2020-06-22 ENCOUNTER — TELEPHONE (OUTPATIENT)
Dept: ANESTHESIOLOGY | Facility: CLINIC | Age: 50
End: 2020-06-22

## 2020-07-24 DIAGNOSIS — M62.830 BACK MUSCLE SPASM: ICD-10-CM

## 2020-07-24 RX ORDER — CYCLOBENZAPRINE HCL 10 MG
TABLET ORAL
Qty: 30 TABLET | Refills: 3 | Status: SHIPPED | OUTPATIENT
Start: 2020-07-24 | End: 2021-03-05

## 2020-11-29 ENCOUNTER — HEALTH MAINTENANCE LETTER (OUTPATIENT)
Age: 50
End: 2020-11-29

## 2021-02-14 ENCOUNTER — HEALTH MAINTENANCE LETTER (OUTPATIENT)
Age: 51
End: 2021-02-14

## 2021-02-16 ENCOUNTER — TELEPHONE (OUTPATIENT)
Dept: ANESTHESIOLOGY | Facility: CLINIC | Age: 51
End: 2021-02-16

## 2021-02-16 NOTE — TELEPHONE ENCOUNTER
RNCC received VM from pt requesting to set up nurse visit for SCS programming. Clinic staff will contact pt.     ASHA Prabhakar, RN

## 2021-02-17 NOTE — TELEPHONE ENCOUNTER
I called and LVM for the pt:    Please call phyllis today and let her know I got her VM. Please set her up with a nurse visit next week and let me know when. Also, please let Phyllis know that I have reached out to the [employer] team and asked for someone to contact her to try to trouble shoot (if possible) about her stimulator issues.     I informed the pt that she can call [phone] with any questions or concerns or to schedule her nurse visit next week.    Sheyla Swanson, CMA

## 2021-02-23 ENCOUNTER — TELEPHONE (OUTPATIENT)
Dept: ANESTHESIOLOGY | Facility: CLINIC | Age: 51
End: 2021-02-23

## 2021-03-04 ENCOUNTER — OFFICE VISIT (OUTPATIENT)
Dept: ANESTHESIOLOGY | Facility: CLINIC | Age: 51
End: 2021-03-04
Payer: COMMERCIAL

## 2021-03-04 VITALS
RESPIRATION RATE: 16 BRPM | WEIGHT: 165 LBS | BODY MASS INDEX: 25.9 KG/M2 | DIASTOLIC BLOOD PRESSURE: 83 MMHG | HEIGHT: 67 IN | HEART RATE: 105 BPM | SYSTOLIC BLOOD PRESSURE: 125 MMHG

## 2021-03-04 DIAGNOSIS — M54.16 LUMBAR RADICULOPATHY: ICD-10-CM

## 2021-03-04 DIAGNOSIS — Z45.42 BATTERY END OF LIFE OF SPINAL CORD STIMULATOR: Primary | ICD-10-CM

## 2021-03-04 PROCEDURE — 99214 OFFICE O/P EST MOD 30 MIN: CPT | Performed by: ANESTHESIOLOGY

## 2021-03-04 RX ORDER — TRAMADOL HYDROCHLORIDE 50 MG/1
25-50 TABLET ORAL
Qty: 10 TABLET | Refills: 0 | Status: SHIPPED | OUTPATIENT
Start: 2021-03-04 | End: 2021-10-04

## 2021-03-04 ASSESSMENT — MIFFLIN-ST. JEOR: SCORE: 1393.13

## 2021-03-04 ASSESSMENT — PAIN SCALES - GENERAL: PAINLEVEL: MODERATE PAIN (5)

## 2021-03-04 NOTE — LETTER
March 4, 2021      To Whom It May Concern:       Phyllis Dane was seen today 3/4/21 for a clinic appointment.       Sincerely,      Nando Cam MD

## 2021-03-04 NOTE — PROGRESS NOTES
[facility] : Progress Note    Date of visit: 3/4/2021    Interval history:    Phyllis Vela is a 50 year old female  is known to our pain clinic for persistent spinal pain.  Patient had L4-L5 laminectomy in 2015. After the laminectomy her pain level increased.  She developed radicular pain to the right lower extremity as well as increased back pain.  She had multiple epidural steroid injections with good relief.  Her lumbar spine MRI in 2016 showed severe loss of disc height at L5-S1 and L4-L5 and mild disc bulge which minimally contacting with the traversing L5 nerve roots.  Also at L5-S1, patient had moderate left and mild to moderate right neuroforaminal stenosis.    Patient was seen by Dr. Humphrey Degroot.  Spinal cord dorsal column stimulation was offered however this couldn't  be done due to insurance changes.     In 2018, she then came to our clinic and saw Dr. Felix Aponte, who offered her spinal cord dorsal column stimulation.  She proceeded with SCS trial and implant. Her postoperative course after the implant was complicated by persistent pain in the battery site and incisional pain.  The recovery took 8 to 10 weeks.      She saw me on May 8, 2020 for a routine visit.  At the time she did not have much complaints.  She stated the neuromodulation was working very well for her.  Occasionally she has generalized body pain and muscle spasm for which she occasionally takes tramadol 50 mg 10 to 15 tablets tablet in every 1 to 2 months.  She called us last month after having some issues with her SCS device.  She stated that she tried to contact with [employer] team to troubleshoot her device.      Although she primarily came today for a nurse visit to reprogram the device.  We found out that her SCS battery is completely dead.  She either need replacement of the battery or revision of the entire SCS system.  The patient states that she would  "like to have a [employer] device this time.  She has been using burst program which is only available in the [employer] device.  Therefore if she wants to change to [employer] she might need a spinal cord stimulation trial.  Patient states that she will make this decision after speaking with the [employer] device rep.  We will contact the [employer] device staff to communicate with her and explained the difference within the systems.  Patient does not have any other further questions.      [place] Prescription Monitoring Program:   Reviewed. No concerns     Review of Systems:  The 14 system ROS was reviewed and was negative except what is documented above and as follows.  Any bowel or bladder problems: none  Mood: okay    Physical Exam:  Vitals:    03/04/21 0702   BP: 125/83   Pulse: 105   Resp: 16   Weight: 74.8 kg (165 lb)   Height: 1.689 m (5' 6.5\")       Medications:  Current Outpatient Medications   Medication Sig Dispense Refill     albuterol (PROAIR HFA/PROVENTIL HFA/VENTOLIN HFA) 108 (90 BASE) MCG/ACT Inhaler Inhale 2 puffs into the lungs as needed       amphetamine-dextroamphetamine (ADDERALL XR) 30 MG per 24 hr capsule Take 20 mg by mouth 2 times daily Takes two tablets in the morning equalling 40 mg a day.       Calcium-Magnesium-Zinc 333-133-5 MG TABS per tablet Take 1 tablet by mouth daily       cetirizine (ZYRTEC) 10 MG tablet Take 10 mg by mouth       cholecalciferol (VITAMIN D3) 1000 UNIT tablet Take 1,000 Units by mouth daily       CHROMIUM PO Take 1 tablet by mouth daily       cyclobenzaprine (FLEXERIL) 10 MG tablet TAKE 1 TABLET(10 MG) BY MOUTH EVERY NIGHT AS NEEDED FOR MUSCLE SPASMS 30 tablet 3     Multiple Vitamins-Minerals (QC WOMENS DAILY MULTIVITAMIN) TABS Take 1 tablet by mouth daily       traMADol (ULTRAM) 50 MG tablet Take 0.5-1 tablets (25-50 mg) by mouth nightly as needed for severe pain 10 tablet 0     lidocaine (XYLOCAINE) 5 % ointment Apply 1 g topically 4 times daily as needed for moderate " pain (Patient not taking: Reported on 3/4/2021) 50 g 1     metFORMIN (GLUCOPHAGE) 500 MG tablet Take 500 mg by mouth         Analgesic Medications:   Medications related to Pain Management (From now, onward)    None             LABORATORY VALUES:   Recent Labs   Lab Test 12/09/14  2250      POTASSIUM 4.0   CHLORIDE 103   CO2 29   ANIONGAP 3   GLC 89   BUN 8   CR 0.82   SUSAN 9.0       CBC RESULTS:   Recent Labs   Lab Test 12/09/14  2250   WBC 8.2   RBC 4.38   HGB 13.4   HCT 40.0   MCV 91   MCH 30.6   MCHC 33.5   RDW 12.1          Most Recent 3 INR's:No lab results found.        ASSESSMENT:      Persistent spinal pain    Myofascial muscle pain    Status post hemilaminectomy L4-5    Status post SCS implant ([employer] device (    SCS device battery malfunction    PLAN:    1. Medications.     Tramadol 50 mg 1.5 to 1 tablet daily as needed.  10 tabs dispensed today.      2. Interventional procedures:    Discussed discussed that changing the battery will be the simplest procedure.  However if she wants at [employer] device, she would need a revision surgery and possibly spinal cord stimulation trial considering differences between burst versus conventional SCS.      3. Labs and imaging: None needed for pain management.     4. Rehab:  The patient is encouraged to stay active as tolerated.     5. Psychology: No current needs.     6. Disposition: We will see the patient for the above-mentioned procedure.    Assessment will be ongoing with changes in treatment as indicated.  Benefits/risks/alternatives to treatment have been reviewed and the patient has been instructed to contact this office if they have any questions or concerns.  This plan of care has been discussed with the patient and the patient is in agreement.     Nando Cam MD, PHD

## 2021-03-04 NOTE — PATIENT INSTRUCTIONS
Medications:    Tramadol prescription for 10 tablets.       Treatment planning:    We will have [employer] with [employer] contact you to discuss the stimulator replacement.       Please call [phone], option #1 to schedule your clinic appointment if you don't already have an appointment scheduled.        To speak with a nurse, schedule/reschedule/cancel a clinic appointment, or request a medication refill call: [phone], option #1.    You can also reach us by [email or web address]: [email or web address]

## 2021-03-04 NOTE — LETTER
March 4, 2021        To Whom It May Concern:      Phyllis Vela has been under the care of our clinic since 2017 for the treatment of low back pain, secondary to post laminectomy syndrome. She reports that her pain is exacerbated by long periods of standing. We kindly ask for you take this into consideration and provide Phyllis with the items she is requesting to help with her pain during working hours.       Sincerely,      Nando Cam MD

## 2021-03-04 NOTE — LETTER
3/4/2021       RE: Phyllis Vela  [address] [place] [address]     Dear Colleague,    Thank you for referring your patient, Phyllis Vela, to the [facility] [place] at [facility]. Please see a copy of my visit note below.    [facility] : Progress Note    Date of visit: 3/4/2021    Interval history:    Phyllis Vela is a 50 year old female  is known to our pain clinic for persistent spinal pain.  Patient had L4-L5 laminectomy in 2015. After the laminectomy her pain level increased.  She developed radicular pain to the right lower extremity as well as increased back pain.  She had multiple epidural steroid injections with good relief.  Her lumbar spine MRI in 2016 showed severe loss of disc height at L5-S1 and L4-L5 and mild disc bulge which minimally contacting with the traversing L5 nerve roots.  Also at L5-S1, patient had moderate left and mild to moderate right neuroforaminal stenosis.    Patient was seen by Dr. Humphrey Degroot.  Spinal cord dorsal column stimulation was offered however this couldn't  be done due to insurance changes.     In 2018, she then came to our clinic and saw Dr. Felix Aponte, who offered her spinal cord dorsal column stimulation.  She proceeded with SCS trial and implant. Her postoperative course after the implant was complicated by persistent pain in the battery site and incisional pain.  The recovery took 8 to 10 weeks.      She saw me on May 8, 2020 for a routine visit.  At the time she did not have much complaints.  She stated the neuromodulation was working very well for her.  Occasionally she has generalized body pain and muscle spasm for which she occasionally takes tramadol 50 mg 10 to 15 tablets tablet in every 1 to 2 months.  She called us last month after having some issues with her SCS device.  She  "stated that she tried to contact with [employer] team to troubleshoot her device.      Although she primarily came today for a nurse visit to reprogram the device.  We found out that her SCS battery is completely dead.  She either need replacement of the battery or revision of the entire SCS system.  The patient states that she would like to have a [employer] device this time.  She has been using burst program which is only available in the [employer] device.  Therefore if she wants to change to [employer] she might need a spinal cord stimulation trial.  Patient states that she will make this decision after speaking with the [employer] device rep.  We will contact the [employer] device staff to communicate with her and explained the difference within the systems.  Patient does not have any other further questions.      [place] Prescription Monitoring Program:   Reviewed. No concerns     Review of Systems:  The 14 system ROS was reviewed and was negative except what is documented above and as follows.  Any bowel or bladder problems: none  Mood: okay    Physical Exam:  Vitals:    03/04/21 0702   BP: 125/83   Pulse: 105   Resp: 16   Weight: 74.8 kg (165 lb)   Height: 1.689 m (5' 6.5\")       Medications:  Current Outpatient Medications   Medication Sig Dispense Refill     albuterol (PROAIR HFA/PROVENTIL HFA/VENTOLIN HFA) 108 (90 BASE) MCG/ACT Inhaler Inhale 2 puffs into the lungs as needed       amphetamine-dextroamphetamine (ADDERALL XR) 30 MG per 24 hr capsule Take 20 mg by mouth 2 times daily Takes two tablets in the morning equalling 40 mg a day.       Calcium-Magnesium-Zinc 333-133-5 MG TABS per tablet Take 1 tablet by mouth daily       cetirizine (ZYRTEC) 10 MG tablet Take 10 mg by mouth       cholecalciferol (VITAMIN D3) 1000 UNIT tablet Take 1,000 Units by mouth daily       CHROMIUM PO Take 1 tablet by mouth daily       cyclobenzaprine (FLEXERIL) 10 MG tablet TAKE 1 TABLET(10 MG) BY MOUTH EVERY NIGHT AS NEEDED FOR MUSCLE " SPASMS 30 tablet 3     Multiple Vitamins-Minerals (QC WOMENS DAILY MULTIVITAMIN) TABS Take 1 tablet by mouth daily       traMADol (ULTRAM) 50 MG tablet Take 0.5-1 tablets (25-50 mg) by mouth nightly as needed for severe pain 10 tablet 0     lidocaine (XYLOCAINE) 5 % ointment Apply 1 g topically 4 times daily as needed for moderate pain (Patient not taking: Reported on 3/4/2021) 50 g 1     metFORMIN (GLUCOPHAGE) 500 MG tablet Take 500 mg by mouth         Analgesic Medications:   Medications related to Pain Management (From now, onward)    None             LABORATORY VALUES:   Recent Labs   Lab Test 12/09/14  2250      POTASSIUM 4.0   CHLORIDE 103   CO2 29   ANIONGAP 3   GLC 89   BUN 8   CR 0.82   SUSAN 9.0       CBC RESULTS:   Recent Labs   Lab Test 12/09/14  2250   WBC 8.2   RBC 4.38   HGB 13.4   HCT 40.0   MCV 91   MCH 30.6   MCHC 33.5   RDW 12.1          Most Recent 3 INR's:No lab results found.        ASSESSMENT:      Persistent spinal pain    Myofascial muscle pain    Status post hemilaminectomy L4-5    Status post SCS implant ([employer] device (    SCS device battery malfunction    PLAN:    1. Medications.     Tramadol 50 mg 1.5 to 1 tablet daily as needed.  10 tabs dispensed today.      2. Interventional procedures:    Discussed discussed that changing the battery will be the simplest procedure.  However if she wants at [employer] device, she would need a revision surgery and possibly spinal cord stimulation trial considering differences between burst versus conventional SCS.      3. Labs and imaging: None needed for pain management.     4. Rehab:  The patient is encouraged to stay active as tolerated.     5. Psychology: No current needs.     6. Disposition: We will see the patient for the above-mentioned procedure.    Assessment will be ongoing with changes in treatment as indicated.  Benefits/risks/alternatives to treatment have been reviewed and the patient has been instructed to contact this office  if they have any questions or concerns.  This plan of care has been discussed with the patient and the patient is in agreement.     Nando Cam MD, PHD      Again, thank you for allowing me to participate in the care of your patient.      Sincerely,    Nando Cam MD

## 2021-03-05 DIAGNOSIS — M62.830 BACK MUSCLE SPASM: ICD-10-CM

## 2021-03-05 RX ORDER — CYCLOBENZAPRINE HCL 10 MG
TABLET ORAL
Qty: 30 TABLET | Refills: 4 | Status: SHIPPED | OUTPATIENT
Start: 2021-03-05 | End: 2021-03-22

## 2021-03-08 NOTE — TELEPHONE ENCOUNTER
RNCC received call from pt. She is requesting to set up follow up with Dr. Cam. She reports that SCS battery is no longer functioning, per [employer].     Pt assisted with scheduling follow up with Dr. Cam 3/4/21 at 0700 for in clinic visit.     Adrianna Florence, CIERAN, RN

## 2021-03-21 DIAGNOSIS — M62.830 BACK MUSCLE SPASM: ICD-10-CM

## 2021-03-22 RX ORDER — CYCLOBENZAPRINE HCL 10 MG
TABLET ORAL
Qty: 30 TABLET | Refills: 4 | Status: SHIPPED | OUTPATIENT
Start: 2021-03-22 | End: 2021-09-22

## 2021-03-24 DIAGNOSIS — M96.1 POSTLAMINECTOMY SYNDROME OF LUMBAR REGION: Primary | ICD-10-CM

## 2021-03-31 NOTE — TELEPHONE ENCOUNTER
FUTURE VISIT INFORMATION      SURGERY INFORMATION:    Date: 4.28.21    Location: [facility] OR    Surgeon:  Dr. Cam    Anesthesia Type:  monitor anesthesia care    Procedure: Replacement of spinal cord stimulator electrode and placement of spinal cord stimulator generator/battery over buttock, removal of old spinal cord stimulator system    Consult: 3.4.21    RECORDS REQUESTED FROM:       Primary Care Provider: Sonya Beasley

## 2021-04-01 ENCOUNTER — MYC MEDICAL ADVICE (OUTPATIENT)
Dept: ANESTHESIOLOGY | Facility: CLINIC | Age: 51
End: 2021-04-01

## 2021-04-01 DIAGNOSIS — Z13.9 ENCOUNTER FOR SCREENING: Primary | ICD-10-CM

## 2021-04-01 DIAGNOSIS — Z96.89 S/P INSERTION OF SPINAL CORD STIMULATOR: Primary | ICD-10-CM

## 2021-04-12 ENCOUNTER — TELEPHONE (OUTPATIENT)
Dept: ANESTHESIOLOGY | Facility: CLINIC | Age: 51
End: 2021-04-12

## 2021-04-12 NOTE — TELEPHONE ENCOUNTER
RNCC received call from pt. She is asking if steroid injection in knee would interfere with upcoming SCS surgery. RNCC contacted Dr. Cam. Per provider, steroid is not advisable with upcoming surgery about 2 weeks away. Non-steroid medications may be used for knee injection. Pt updated. She verbalized understanding and declined questions.     CIERA PrabhakarN, RN

## 2021-04-13 DIAGNOSIS — Z11.59 ENCOUNTER FOR SCREENING FOR OTHER VIRAL DISEASES: ICD-10-CM

## 2021-04-13 DIAGNOSIS — Z13.9 ENCOUNTER FOR SCREENING: Primary | ICD-10-CM

## 2021-04-14 ENCOUNTER — PRE VISIT (OUTPATIENT)
Dept: SURGERY | Facility: CLINIC | Age: 51
End: 2021-04-14

## 2021-04-14 ENCOUNTER — ANESTHESIA EVENT (OUTPATIENT)
Dept: SURGERY | Facility: AMBULATORY SURGERY CENTER | Age: 51
End: 2021-04-14

## 2021-04-14 ENCOUNTER — VIRTUAL VISIT (OUTPATIENT)
Dept: SURGERY | Facility: CLINIC | Age: 51
End: 2021-04-14
Payer: COMMERCIAL

## 2021-04-14 DIAGNOSIS — Z11.59 ENCOUNTER FOR SCREENING FOR OTHER VIRAL DISEASES: ICD-10-CM

## 2021-04-14 DIAGNOSIS — Z13.9 ENCOUNTER FOR SCREENING: ICD-10-CM

## 2021-04-14 DIAGNOSIS — Z01.818 PREOP EXAMINATION: Primary | ICD-10-CM

## 2021-04-14 LAB
ALBUMIN UR-MCNC: NEGATIVE MG/DL
ANION GAP SERPL CALCULATED.3IONS-SCNC: 2 MMOL/L (ref 3–14)
APPEARANCE UR: CLEAR
BILIRUB UR QL STRIP: NEGATIVE
BUN SERPL-MCNC: 11 MG/DL (ref 7–30)
CALCIUM SERPL-MCNC: 9 MG/DL (ref 8.5–10.1)
CHLORIDE SERPL-SCNC: 104 MMOL/L (ref 94–109)
CO2 SERPL-SCNC: 30 MMOL/L (ref 20–32)
COLOR UR AUTO: YELLOW
CREAT SERPL-MCNC: 0.85 MG/DL (ref 0.52–1.04)
ERYTHROCYTE [DISTWIDTH] IN BLOOD BY AUTOMATED COUNT: 12.4 % (ref 10–15)
GFR SERPL CREATININE-BSD FRML MDRD: 80 ML/MIN/{1.73_M2}
GLUCOSE SERPL-MCNC: 86 MG/DL (ref 70–99)
GLUCOSE UR STRIP-MCNC: NEGATIVE MG/DL
HCT VFR BLD AUTO: 42.8 % (ref 35–47)
HGB BLD-MCNC: 14.7 G/DL (ref 11.7–15.7)
HGB UR QL STRIP: NEGATIVE
INR PPP: 0.9 (ref 0.86–1.14)
KETONES UR STRIP-MCNC: NEGATIVE MG/DL
LEUKOCYTE ESTERASE UR QL STRIP: NEGATIVE
MCH RBC QN AUTO: 32.8 PG (ref 26.5–33)
MCHC RBC AUTO-ENTMCNC: 34.3 G/DL (ref 31.5–36.5)
MCV RBC AUTO: 96 FL (ref 78–100)
MRSA DNA SPEC QL NAA+PROBE: NEGATIVE
NITRATE UR QL: NEGATIVE
PH UR STRIP: 5.5 PH (ref 5–7)
PLATELET # BLD AUTO: 243 10E9/L (ref 150–450)
POTASSIUM SERPL-SCNC: 4.2 MMOL/L (ref 3.4–5.3)
RBC # BLD AUTO: 4.48 10E12/L (ref 3.8–5.2)
SODIUM SERPL-SCNC: 136 MMOL/L (ref 133–144)
SOURCE: NORMAL
SP GR UR STRIP: 1.01 (ref 1–1.03)
SPECIMEN SOURCE: NORMAL
UROBILINOGEN UR STRIP-ACNC: 0.2 EU/DL (ref 0.2–1)
WBC # BLD AUTO: 7.9 10E9/L (ref 4–11)

## 2021-04-14 PROCEDURE — 99213 OFFICE O/P EST LOW 20 MIN: CPT | Performed by: CLINICAL NURSE SPECIALIST

## 2021-04-14 PROCEDURE — 85610 PROTHROMBIN TIME: CPT | Performed by: ANESTHESIOLOGY

## 2021-04-14 PROCEDURE — 81003 URINALYSIS AUTO W/O SCOPE: CPT | Performed by: ANESTHESIOLOGY

## 2021-04-14 PROCEDURE — 85027 COMPLETE CBC AUTOMATED: CPT | Performed by: ANESTHESIOLOGY

## 2021-04-14 PROCEDURE — 87640 STAPH A DNA AMP PROBE: CPT | Mod: XU | Performed by: ANESTHESIOLOGY

## 2021-04-14 PROCEDURE — 80048 BASIC METABOLIC PNL TOTAL CA: CPT | Performed by: ANESTHESIOLOGY

## 2021-04-14 PROCEDURE — 87641 MR-STAPH DNA AMP PROBE: CPT | Performed by: ANESTHESIOLOGY

## 2021-04-14 PROCEDURE — 36415 COLL VENOUS BLD VENIPUNCTURE: CPT | Performed by: ANESTHESIOLOGY

## 2021-04-14 RX ORDER — FLUTICASONE PROPIONATE 50 MCG
SPRAY, SUSPENSION (ML) NASAL 2 TIMES DAILY
COMMUNITY
Start: 2021-03-27

## 2021-04-14 RX ORDER — GUAIFENESIN AND DEXTROMETHORPHAN HYDROBROMIDE 600; 30 MG/1; MG/1
1 TABLET, EXTENDED RELEASE ORAL EVERY MORNING
COMMUNITY

## 2021-04-14 ASSESSMENT — LIFESTYLE VARIABLES: TOBACCO_USE: 1

## 2021-04-14 ASSESSMENT — PAIN SCALES - GENERAL: PAINLEVEL: MODERATE PAIN (5)

## 2021-04-14 NOTE — PATIENT INSTRUCTIONS
Preparing for Your Surgery      Name:  Phyllis Vela   MRN:  [id number]   :  1970   Today's Date:  2021       Arriving for surgery:  Surgery date:  21  Arrival time:  08:30 am    Restrictions due to COVID 19:  One consistent visitor per patient is allowed.  The visitor will be allowed in the pre-op area.  Visitors are asked to leave the building during the surgery.  No ill visitors.  All visitors must wear face mask.    [employer] parking is available for anyone with mobility limitations or disabilities.  ([employer] or Ste[employer]  24 hours/ 7 days a week; [employer]  7 am- 3:30 pm, Mon- Fri)    Please come to:   [facility] [address] [address]  -  Proceed to the 5th floor to check into the Ambulatory Surgery Center.              >> There will be patient concierges on the 1st and 5th floor, for assistance or an escort, if you would like.              >> Please call [phone] with any questions.    What can I eat or drink?  -  You may eat and drink normally for up to 8 hours before your surgery.   -  You may have clear liquids until 4 hours before surgery.    Examples of clear liquids:  Water  Clear broth  Juices (apple, white grape, white cranberry  and cider) without pulp  Noncarbonated, powder based beverages  (lemonade and Heriberto-Aid)  Sodas (Sprite, 7-Up, ginger ale and seltzer)  Coffee or tea (without milk or cream)  Gatorade    -  No Alcohol for at least 24 hours before surgery     Which medicines can I take?    Hold Aspirin for 7 days before surgery.   Hold Multivitamins for 7 days before surgery.  Hold Supplements for 7 days before surgery.  Hold Ibuprofen (Advil, Motrin) for 1 day before surgery--unless otherwise directed by surgeon.  Hold Naproxen (Aleve) for 4 days before surgery.    -  DO NOT take these medications the day of surgery:  adderall + metformin.  -  PLEASE TAKE these medications the day of surgery:  Tylenol if needed;  take other morning medications.    How do I prepare myself?  - Please take 2 showers before surgery using Scrubcare or Hibiclens soap.    Use this soap only from the neck to your toes.     Leave the soap on your skin for one minute--then rinse thoroughly.      You may use your own shampoo and conditioner; no other hair products.   - Please remove all jewelry and body piercings.  - No lotions, deodorants or fragrance.  - No makeup or fingernail polish.   - Bring your ID and insurance card.    - All patients are required to have a Covid-19 test within 4 days of surgery/procedure.      -Patients will be contacted by the [facility] scheduling team within 1 week of surgery to make an appointment.      - Patients may call the Scheduling team at [phone] if they have not been scheduled within 4 days of  surgery.      ALL PATIENTS GOING HOME THE SAME DAY OF SURGERY ARE REQUIRED TO HAVE A RESPONSIBLE ADULT TO DRIVE AND BE IN ATTENDANCE WITH THEM FOR 24 HOURS FOLLOWING SURGERY.    IF THE RESPONSIBLE ADULT IS REQUIRED FOR POST OP TEACHING THE POST OP RN WILL ASK THEM TO COME BACK TO THE RECOVERY AREA.    Questions or Concerns:    - For any questions regarding the day of surgery or your hospital stay, please contact the Pre Admission Nursing Office at [phone].       - If you have health changes between today and your surgery please call your surgeon.       For questions after surgery please call your surgeons office.

## 2021-04-14 NOTE — H&P
Pre-Operative H & P     CC:  Preoperative exam to assess for increased cardiopulmonary risk while undergoing surgery and anesthesia.    Date of Encounter: 2021  Primary Care Physician:  Sonya Beasley  Reason for visit: Postlaminectomy syndrome of lumbar region [M96.1]  HPI  Phyllis Vela is a 50 year old female who presents for pre-operative H & P in preparation for Replacement of spinal cord stimulator electrode and placement of spinal cord stimulator generator/battery over buttock, removal of old spinal cord stimulator system with Dr. Cam on 21 at [facility]. History is obtained from the patient and medical records.     At the beginning of this visit patient ID was confirmed using name and .     Video-Visit Details    Type of service:  Video Visit-converted to phone visit due to unstable connection.     Patient verbally consented to video service today: YES      Video Start Time: 10:51am   Video End Time (time video stopped): 11:14 am    Originating Location (pt. Location): Home    Distant Location (provider location):  [facility]    Mode of Communication:  Video Conference started via Dox[employer]ity-patient preference, converted to phone visit.    Patient who is followed by Dr. Cam for persistent spinal pain. She is s/p L4-L5 laminectomy in  but after the laminectomy her pain level increased and she developed radicular pain to the right lower extremity. She is s/p multiple epidural steroid injections with good relief. Per notes her lumbar spine MRI in 2016 showed severe loss of disc height at L5-S1 and L4-L5 and mild disc bulge which minimally contacting with the traversing L5 nerve roots. Also at L5-S1, patient had moderate left and mild to moderate right neuroforaminal stenosis. She currently has a spinal cord stimulator in place, inserted in 2018, that has been found to have a depleted battery. She was counseled for above  procedure.    Her history is otherwise significant for seasonal allergies, prediabetes. ADD, anxiety, and depression. Today patient denies fever, cough, shortness of breath, chest pain, or irregular HR. She continues to smoke 1/2 PPD or less. She was able to be active until recent knee injury in 10/2020. She has had two knee injections.     Past Medical History  Past Medical History:   Diagnosis Date     ADD (attention deficit disorder) 2013     ADHD (attention deficit hyperactivity disorder)      Chronic back pain 2012     DDD (degenerative disc disease), lumbar 2011     Depressive disorder      Diabetes (H)     border line     Generalized anxiety disorder 2011     Head injury     concussion as a child     Hyperglycemia 2010     Hyperglyceridemia 2010     Insomnia 10/31/2013     Lumbar stenosis 2011     Other chronic pain      PONV (postoperative nausea and vomiting)      Postlaminectomy syndrome of lumbar region        Past Surgical History  Past Surgical History:   Procedure Laterality Date     BACK SURGERY Right 2015    Right L4/5 hemilaminectomy, discectomy     BREAST SURGERY Right 06/10/2004    breast biopsy      SECTION        SECTION        SECTION       CHOLECYSTECTOMY       INJECT SACROILIAC JOINT Right 2017    Procedure: INJECT SACROILIAC JOINT;  Right Sacroiliac Joint Injection;  Surgeon: Felix Aponte MD;  Location: [facility] OR     INSERT STIMULATOR DORSAL COLUMN Bilateral 2018    Procedure: INSERT STIMULATOR DORSAL COLUMN;  Bilateral Spinal Cord Stimulator Implant;  Surgeon: Felix Aponte MD;  Location: [facility] OR     INSERT STIMULATOR DORSAL COLUMN TRIAL Bilateral 2018    Procedure: INSERT STIMULATOR DORSAL COLUMN TRIAL;  Insertion Bilateral Dorsal Column Stimulator (Trial)    ;  Surgeon: Felix Aponte MD;  Location: [facility] OR       Hx of Blood  transfusions/reactions: Denies.      Hx of abnormal bleeding or anti-platelet use: Denies.     Menstrual history: Patient's last menstrual period was 03/27/2021 (approximate).    Steroid use in the last year: Injections to knees.     Personal or FH with difficulty with Anesthesia:  PONV, severe, but did not have issues with procedure in 2018.    Prior to Admission Medications  Current Outpatient Medications   Medication Sig Dispense Refill     albuterol (PROAIR HFA/PROVENTIL HFA/VENTOLIN HFA) 108 (90 BASE) MCG/ACT Inhaler Inhale 2 puffs into the lungs as needed       amphetamine-dextroamphetamine (ADDERALL XR) 30 MG per 24 hr capsule Take 20 mg by mouth every morning Takes two tablets in the morning equalling 40 mg a day.       Calcium-Magnesium-Zinc 333-133-5 MG TABS per tablet Take 1 tablet by mouth every morning        cetirizine (ZYRTEC) 10 MG tablet Take 10 mg by mouth every morning        cholecalciferol (VITAMIN D3) 1000 UNIT tablet Take 1,000 Units by mouth every morning        CHROMIUM PO Take 1 tablet by mouth as needed        cyclobenzaprine (FLEXERIL) 10 MG tablet TAKE 1 TABLET(10 MG) BY MOUTH EVERY NIGHT AS NEEDED FOR MUSCLE SPASMS (Patient taking differently: At Bedtime TAKE 1 TABLET(10 MG) BY MOUTH EVERY NIGHT AS NEEDED FOR MUSCLE SPASMS) 30 tablet 4     dextromethorphan-guaiFENesin (MUCINEX DM)  MG 12 hr tablet Take 1 tablet by mouth every morning       fluticasone (FLONASE) 50 MCG/ACT nasal spray 2 times daily        lidocaine (XYLOCAINE) 5 % ointment Apply 1 g topically 4 times daily as needed for moderate pain 50 g 1     Multiple Vitamins-Minerals (QC WOMENS DAILY MULTIVITAMIN) TABS Take 1 tablet by mouth every morning        traMADol (ULTRAM) 50 MG tablet Take 0.5-1 tablets (25-50 mg) by mouth nightly as needed for severe pain 10 tablet 0     chlorhexidine (HIBICLENS) 4 % liquid Wash night before and morning of surgery, per clinic instructions 118 mL 0     metFORMIN (GLUCOPHAGE) 500 MG  tablet Take 500 mg by mouth         Allergies  Allergies   Allergen Reactions     Sulfa Drugs Hives     Amitriptyline      Other reaction(s): Other, see comments  Hair loss     Lyrica [Pregabalin]      Other reaction(s): Edema,generalized  Other reaction(s): Edema     Nortriptyline      Other reaction(s): Alopecia  Other reaction(s): Other, see comments  Hairloss, increased blood pressure per patient     Amoxicillin Rash     And itching      Clindamycin Rash     Airway impermeant      Gabapentin Rash       Social History  Social History     Socioeconomic History     Marital status:      Spouse name: Not on file     Number of children: Not on file     Years of education: Not on file     Highest education level: Not on file   Occupational History     Not on file   Social Needs     Financial resource strain: Not on file     Food insecurity     Worry: Not on file     Inability: Not on file     Transportation needs     Medical: Not on file     Non-medical: Not on file   Tobacco Use     Smoking status: Current Every Day Smoker     Packs/day: 0.50     Years: 25.00     Pack years: 12.50     Types: Cigarettes     Start date: 10/30/1990     Smokeless tobacco: Never Used   Substance and Sexual Activity     Alcohol use: Yes     Alcohol/week: 2.0 standard drinks     Types: 2 Cans of beer per week     Comment: WEEKLY     Drug use: No     Sexual activity: Yes     Partners: Male   Lifestyle     Physical activity     Days per week: Not on file     Minutes per session: Not on file     Stress: Not on file   Relationships     Social connections     Talks on phone: Not on file     Gets together: Not on file     Attends [employer] service: Not on file     Active member of club or organization: Not on file     Attends meetings of clubs or organizations: Not on file     Relationship status: Not on file     Intimate partner violence     Fear of current or ex partner: Not on file     Emotionally abused: Not on file     Physically  abused: Not on file     Forced sexual activity: Not on file   Other Topics Concern     Not on file   Social History Narrative     Not on file       Family History  Family History   Problem Relation Age of Onset     Aortic aneurysm Mother      Diabetes Mother      Heart Disease Mother      Hypertension Mother      Lung Cancer Mother      Hypertension Father      Lung Cancer Father      Diabetes Sister      Sleep Apnea Sister      Diabetes Maternal Grandmother      Dementia Maternal Grandfather      Breast Cancer Maternal Aunt      Diabetes Maternal Aunt      Dementia Maternal Uncle      Diabetes Paternal Uncle      Breast Cancer Paternal Aunt      Pancreatic Cancer Paternal Aunt      Breast Cancer Maternal Cousin      Breast Cancer Maternal Cousin      Seizure Disorder Son        ROS/MED HISTORY  The complete review of systems is negative other than noted in the HPI or here.    ENT/Pulmonary: Comment: Continues to smoke 1/2 PPD    (+) AVERY risk factors, observed stopped breathing, allergic rhinitis, tobacco use, Current use,     Neurologic: Comment: ADD  Spinal cord stimulator, depleted battery        Cardiovascular:     (+) -----No previous cardiac testing  (-) taking anticoagulants/antiplatelets   METS/Exercise Tolerance: 4 - Raking leaves, gardening Comment: Recently more limited due to knee pain.   Hematologic:  - neg hematologic  ROS     Musculoskeletal: Comment: DDD  Recent knee injury s/p injections      GI/Hepatic:  - neg GI/hepatic ROS     Renal/Genitourinary:  - neg Renal ROS     Endo:     (+) type II DM, Last HgA1c: 6.1, date: 1/20/21, Not using insulin, - not using insulin pump.     Psychiatric/Substance Use: Comment: Tramadol    (+) psychiatric history anxiety and depression     Infectious Disease:     (+) MRSA,     Malignancy:  - neg malignancy ROS     Other:      (+) LMP: 3/27/21, , H/O Chronic Pain,       Physical Exam  Constitutional: Awake, alert, no apparent distress, and appears stated  age.  Respiratory: No cough or obvious dyspnea. Able to converse without shortness of breath.  Neuropsychiatric: Calm, cooperative. Normal affect.     Please refer to the physical examination documented by the anesthesiologist in the anesthesia record on the day of surgery    Labs: (personally reviewed)   Lab Results   Component Value Date    WBC 7.9 04/14/2021     Lab Results   Component Value Date    RBC 4.48 04/14/2021     Lab Results   Component Value Date    HGB 14.7 04/14/2021     Lab Results   Component Value Date    HCT 42.8 04/14/2021     Lab Results   Component Value Date    MCV 96 04/14/2021     Lab Results   Component Value Date    MCH 32.8 04/14/2021     Lab Results   Component Value Date    MCHC 34.3 04/14/2021     Lab Results   Component Value Date    RDW 12.4 04/14/2021     Lab Results   Component Value Date     04/14/2021     UA RESULTS:  Recent Labs   Lab Test 04/14/21  1021   COLOR Yellow   APPEARANCE Clear   URINEGLC Negative   URINEBILI Negative   URINEKETONE Negative   SG 1.010   UBLD Negative   URINEPH 5.5   PROTEIN Negative   UROBILINOGEN 0.2   NITRITE Negative   LEUKEST Negative     INR 0.90  BMP pending  EKG: Not indicated.       Xray thoracic spine 2019                                                                   Impression:  1. Intact spinal stimulator.  2. Degenerative spondylosis most evident at L4-5 and L5-S1.  3. No coronal or sagittal imbalance.    Imaging reviewed by this provider        Outside records reviewed from: Care Everywhere    ASSESSMENT and PLAN  Phyllis Vela is a 50 year old female scheduled to undergo Replacement of spinal cord stimulator electrode and placement of spinal cord stimulator generator/battery over buttock, removal of old spinal cord stimulator system with Dr. Cam on 4/28/21. She has the following specific operative considerations:   - RCRI : No serious cardiac risks.   - Anesthesia considerations:  Refer to PAC assessment in anesthesia  records  - VTE risk: 0.26%  - AVERY # of risks 1-2/8 = Low risk  - Risk of PONV score = 4.  If > 2, anti-emetic intervention recommended. If 3 or > anti emetic intervention recommended with two or more meds    --Post laminectomy syndrome s/p spinal cord stimulator, now with depleted battery. Above procedure planned for replacement with MAC. Patient comfortable with plan. Tramadol and Flexeril at HS.  --PONV. Significant history. Reports no problems with last anesthesia in 2018. Final decisions regarding prophylaxis by Anesthesia on DOS.   --No cardiac history, symptoms or meds. Typically good activity tolerance, but injured her knee in 10/2020, so somewhat limited at this time.   --Current smoker 1/2 PPD X 25 years. Denies pulmonary symptoms. Seasonal allergies. Will take Zyrtec on DOS. Occasional use of albuterol related to allergies, but none recently.   --Pre DIABETES MELLITUS. Last A1c 6.1. Does not take metformin.  --History of anxiety and depression. No meds at this time.   --ADHD. Will hold Adderall on DOS.   --Requested to speak to nurse coordinator with Dr. Cam regarding post op concerns. Message provided.     Arrival time, NPO, shower and medication instructions provided by nursing staff today.     AMIRAH Sarabia CNS  Preoperative Assessment Center  [facility]  Phone: [phone]  Fax: [phone]

## 2021-04-14 NOTE — H&P (VIEW-ONLY)
Pre-Operative H & P     CC:  Preoperative exam to assess for increased cardiopulmonary risk while undergoing surgery and anesthesia.    Date of Encounter: 2021  Primary Care Physician:  Sonya Beasley  Reason for visit: Postlaminectomy syndrome of lumbar region [M96.1]  HPI  Phyllis Vela is a 50 year old female who presents for pre-operative H & P in preparation for Replacement of spinal cord stimulator electrode and placement of spinal cord stimulator generator/battery over buttock, removal of old spinal cord stimulator system with Dr. Cam on 21 at [facility]. History is obtained from the patient and medical records.     At the beginning of this visit patient ID was confirmed using name and .     Video-Visit Details    Type of service:  Video Visit-converted to phone visit due to unstable connection.     Patient verbally consented to video service today: YES      Video Start Time: 10:51am   Video End Time (time video stopped): 11:14 am    Originating Location (pt. Location): Home    Distant Location (provider location):  [facility]    Mode of Communication:  Video Conference started via Doximity-patient preference, converted to phone visit.    Patient who is followed by Dr. Cam for persistent spinal pain. She is s/p L4-L5 laminectomy in  but after the laminectomy her pain level increased and she developed radicular pain to the right lower extremity. She is s/p multiple epidural steroid injections with good relief. Per notes her lumbar spine MRI in 2016 showed severe loss of disc height at L5-S1 and L4-L5 and mild disc bulge which minimally contacting with the traversing L5 nerve roots. Also at L5-S1, patient had moderate left and mild to moderate right neuroforaminal stenosis. She currently has a spinal cord stimulator in place, inserted in 2018, that has been found to have a depleted battery. She was counseled for above  procedure.    Her history is otherwise significant for seasonal allergies, prediabetes. ADD, anxiety, and depression. Today patient denies fever, cough, shortness of breath, chest pain, or irregular HR. She continues to smoke 1/2 PPD or less. She was able to be active until recent knee injury in 10/2020. She has had two knee injections.     Past Medical History  Past Medical History:   Diagnosis Date     ADD (attention deficit disorder) 2013     ADHD (attention deficit hyperactivity disorder)      Chronic back pain 2012     DDD (degenerative disc disease), lumbar 2011     Depressive disorder      Diabetes (H)     border line     Generalized anxiety disorder 2011     Head injury     concussion as a child     Hyperglycemia 2010     Hyperglyceridemia 2010     Insomnia 10/31/2013     Lumbar stenosis 2011     Other chronic pain      PONV (postoperative nausea and vomiting)      Postlaminectomy syndrome of lumbar region        Past Surgical History  Past Surgical History:   Procedure Laterality Date     BACK SURGERY Right 2015    Right L4/5 hemilaminectomy, discectomy     BREAST SURGERY Right 06/10/2004    breast biopsy      SECTION        SECTION        SECTION       CHOLECYSTECTOMY       INJECT SACROILIAC JOINT Right 2017    Procedure: INJECT SACROILIAC JOINT;  Right Sacroiliac Joint Injection;  Surgeon: Felix Aponte MD;  Location: [facility] OR     INSERT STIMULATOR DORSAL COLUMN Bilateral 2018    Procedure: INSERT STIMULATOR DORSAL COLUMN;  Bilateral Spinal Cord Stimulator Implant;  Surgeon: Felix Aponte MD;  Location: [facility] OR     INSERT STIMULATOR DORSAL COLUMN TRIAL Bilateral 2018    Procedure: INSERT STIMULATOR DORSAL COLUMN TRIAL;  Insertion Bilateral Dorsal Column Stimulator (Trial)    ;  Surgeon: Felix Aponte MD;  Location: [facility] OR       Hx of Blood  transfusions/reactions: Denies.      Hx of abnormal bleeding or anti-platelet use: Denies.     Menstrual history: Patient's last menstrual period was 03/27/2021 (approximate).    Steroid use in the last year: Injections to knees.     Personal or FH with difficulty with Anesthesia:  PONV, severe, but did not have issues with procedure in 2018.    Prior to Admission Medications  Current Outpatient Medications   Medication Sig Dispense Refill     albuterol (PROAIR HFA/PROVENTIL HFA/VENTOLIN HFA) 108 (90 BASE) MCG/ACT Inhaler Inhale 2 puffs into the lungs as needed       amphetamine-dextroamphetamine (ADDERALL XR) 30 MG per 24 hr capsule Take 20 mg by mouth every morning Takes two tablets in the morning equalling 40 mg a day.       Calcium-Magnesium-Zinc 333-133-5 MG TABS per tablet Take 1 tablet by mouth every morning        cetirizine (ZYRTEC) 10 MG tablet Take 10 mg by mouth every morning        cholecalciferol (VITAMIN D3) 1000 UNIT tablet Take 1,000 Units by mouth every morning        CHROMIUM PO Take 1 tablet by mouth as needed        cyclobenzaprine (FLEXERIL) 10 MG tablet TAKE 1 TABLET(10 MG) BY MOUTH EVERY NIGHT AS NEEDED FOR MUSCLE SPASMS (Patient taking differently: At Bedtime TAKE 1 TABLET(10 MG) BY MOUTH EVERY NIGHT AS NEEDED FOR MUSCLE SPASMS) 30 tablet 4     dextromethorphan-guaiFENesin (MUCINEX DM)  MG 12 hr tablet Take 1 tablet by mouth every morning       fluticasone (FLONASE) 50 MCG/ACT nasal spray 2 times daily        lidocaine (XYLOCAINE) 5 % ointment Apply 1 g topically 4 times daily as needed for moderate pain 50 g 1     Multiple Vitamins-Minerals (QC WOMENS DAILY MULTIVITAMIN) TABS Take 1 tablet by mouth every morning        traMADol (ULTRAM) 50 MG tablet Take 0.5-1 tablets (25-50 mg) by mouth nightly as needed for severe pain 10 tablet 0     chlorhexidine (HIBICLENS) 4 % liquid Wash night before and morning of surgery, per clinic instructions 118 mL 0     metFORMIN (GLUCOPHAGE) 500 MG  tablet Take 500 mg by mouth         Allergies  Allergies   Allergen Reactions     Sulfa Drugs Hives     Amitriptyline      Other reaction(s): Other, see comments  Hair loss     Lyrica [Pregabalin]      Other reaction(s): Edema,generalized  Other reaction(s): Edema     Nortriptyline      Other reaction(s): Alopecia  Other reaction(s): Other, see comments  Hairloss, increased blood pressure per patient     Amoxicillin Rash     And itching      Clindamycin Rash     Airway impermeant      Gabapentin Rash       Social History  Social History     Socioeconomic History     Marital status:      Spouse name: Not on file     Number of children: Not on file     Years of education: Not on file     Highest education level: Not on file   Occupational History     Not on file   Social Needs     Financial resource strain: Not on file     Food insecurity     Worry: Not on file     Inability: Not on file     Transportation needs     Medical: Not on file     Non-medical: Not on file   Tobacco Use     Smoking status: Current Every Day Smoker     Packs/day: 0.50     Years: 25.00     Pack years: 12.50     Types: Cigarettes     Start date: 10/30/1990     Smokeless tobacco: Never Used   Substance and Sexual Activity     Alcohol use: Yes     Alcohol/week: 2.0 standard drinks     Types: 2 Cans of beer per week     Comment: WEEKLY     Drug use: No     Sexual activity: Yes     Partners: Male   Lifestyle     Physical activity     Days per week: Not on file     Minutes per session: Not on file     Stress: Not on file   Relationships     Social connections     Talks on phone: Not on file     Gets together: Not on file     Attends Yazdanism service: Not on file     Active member of club or organization: Not on file     Attends meetings of clubs or organizations: Not on file     Relationship status: Not on file     Intimate partner violence     Fear of current or ex partner: Not on file     Emotionally abused: Not on file     Physically  abused: Not on file     Forced sexual activity: Not on file   Other Topics Concern     Not on file   Social History Narrative     Not on file       Family History  Family History   Problem Relation Age of Onset     Aortic aneurysm Mother      Diabetes Mother      Heart Disease Mother      Hypertension Mother      Lung Cancer Mother      Hypertension Father      Lung Cancer Father      Diabetes Sister      Sleep Apnea Sister      Diabetes Maternal Grandmother      Dementia Maternal Grandfather      Breast Cancer Maternal Aunt      Diabetes Maternal Aunt      Dementia Maternal Uncle      Diabetes Paternal Uncle      Breast Cancer Paternal Aunt      Pancreatic Cancer Paternal Aunt      Breast Cancer Maternal Cousin      Breast Cancer Maternal Cousin      Seizure Disorder Son        ROS/MED HISTORY  The complete review of systems is negative other than noted in the HPI or here.    ENT/Pulmonary: Comment: Continues to smoke 1/2 PPD    (+) AVERY risk factors, observed stopped breathing, allergic rhinitis, tobacco use, Current use,     Neurologic: Comment: ADD  Spinal cord stimulator, depleted battery        Cardiovascular:     (+) -----No previous cardiac testing  (-) taking anticoagulants/antiplatelets   METS/Exercise Tolerance: 4 - Raking leaves, gardening Comment: Recently more limited due to knee pain.   Hematologic:  - neg hematologic  ROS     Musculoskeletal: Comment: DDD  Recent knee injury s/p injections      GI/Hepatic:  - neg GI/hepatic ROS     Renal/Genitourinary:  - neg Renal ROS     Endo:     (+) type II DM, Last HgA1c: 6.1, date: 1/20/21, Not using insulin, - not using insulin pump.     Psychiatric/Substance Use: Comment: Tramadol    (+) psychiatric history anxiety and depression     Infectious Disease:     (+) MRSA,     Malignancy:  - neg malignancy ROS     Other:      (+) LMP: 3/27/21, , H/O Chronic Pain,       Physical Exam  Constitutional: Awake, alert, no apparent distress, and appears stated  age.  Respiratory: No cough or obvious dyspnea. Able to converse without shortness of breath.  Neuropsychiatric: Calm, cooperative. Normal affect.     Please refer to the physical examination documented by the anesthesiologist in the anesthesia record on the day of surgery    Labs: (personally reviewed)   Lab Results   Component Value Date    WBC 7.9 04/14/2021     Lab Results   Component Value Date    RBC 4.48 04/14/2021     Lab Results   Component Value Date    HGB 14.7 04/14/2021     Lab Results   Component Value Date    HCT 42.8 04/14/2021     Lab Results   Component Value Date    MCV 96 04/14/2021     Lab Results   Component Value Date    MCH 32.8 04/14/2021     Lab Results   Component Value Date    MCHC 34.3 04/14/2021     Lab Results   Component Value Date    RDW 12.4 04/14/2021     Lab Results   Component Value Date     04/14/2021     UA RESULTS:  Recent Labs   Lab Test 04/14/21  1021   COLOR Yellow   APPEARANCE Clear   URINEGLC Negative   URINEBILI Negative   URINEKETONE Negative   SG 1.010   UBLD Negative   URINEPH 5.5   PROTEIN Negative   UROBILINOGEN 0.2   NITRITE Negative   LEUKEST Negative     INR 0.90  BMP pending  EKG: Not indicated.       Xray thoracic spine 2019                                                                   Impression:  1. Intact spinal stimulator.  2. Degenerative spondylosis most evident at L4-5 and L5-S1.  3. No coronal or sagittal imbalance.    Imaging reviewed by this provider        Outside records reviewed from: Care Everywhere    ASSESSMENT and PLAN  Phyllis Vela is a 50 year old female scheduled to undergo Replacement of spinal cord stimulator electrode and placement of spinal cord stimulator generator/battery over buttock, removal of old spinal cord stimulator system with Dr. Cam on 4/28/21. She has the following specific operative considerations:   - RCRI : No serious cardiac risks.   - Anesthesia considerations:  Refer to PAC assessment in anesthesia  records  - VTE risk: 0.26%  - AVERY # of risks 1-2/8 = Low risk  - Risk of PONV score = 4.  If > 2, anti-emetic intervention recommended. If 3 or > anti emetic intervention recommended with two or more meds    --Post laminectomy syndrome s/p spinal cord stimulator, now with depleted battery. Above procedure planned for replacement with MAC. Patient comfortable with plan. Tramadol and Flexeril at HS.  --PONV. Significant history. Reports no problems with last anesthesia in 2018. Final decisions regarding prophylaxis by Anesthesia on DOS.   --No cardiac history, symptoms or meds. Typically good activity tolerance, but injured her knee in 10/2020, so somewhat limited at this time.   --Current smoker 1/2 PPD X 25 years. Denies pulmonary symptoms. Seasonal allergies. Will take Zyrtec on DOS. Occasional use of albuterol related to allergies, but none recently.   --Pre DIABETES MELLITUS. Last A1c 6.1. Does not take metformin.  --History of anxiety and depression. No meds at this time.   --ADHD. Will hold Adderall on DOS.   --Requested to speak to nurse coordinator with Dr. Cam regarding post op concerns. Message provided.     Arrival time, NPO, shower and medication instructions provided by nursing staff today.     AMIRAH Sarabia CNS  Preoperative Assessment Center  [facility]  Phone: [phone]  Fax: [phone]

## 2021-04-14 NOTE — PROGRESS NOTES
Phyllis is a 50 year old who is being evaluated via a billable video visit.      How would you like to obtain your AVS? MyChart  If the video visit is dropped, the invitation should be resent by: Text to cell phone: [phone]  Will anyone else be joining your video visit? No      HPI     Review of Systems         Objective    Vitals - Patient Reported  Pain Score: Moderate Pain (5)        Physical Exam     AVIS Ruiz LPN

## 2021-04-15 ENCOUNTER — TELEPHONE (OUTPATIENT)
Dept: ANESTHESIOLOGY | Facility: CLINIC | Age: 51
End: 2021-04-15

## 2021-04-19 NOTE — TELEPHONE ENCOUNTER
RNCC received message that pt requesting a call. RNCC contacted pt. Discussed documentation needed for work for upcoming surgery on 4/28/21. Questions associated with surgery addressed during call. Pt has my direct call back number in the event she has additional questions.     Adrianna Florence, CIERAN, RN

## 2021-04-26 DIAGNOSIS — Z11.59 ENCOUNTER FOR SCREENING FOR OTHER VIRAL DISEASES: ICD-10-CM

## 2021-04-26 DIAGNOSIS — Z13.9 ENCOUNTER FOR SCREENING: ICD-10-CM

## 2021-04-26 LAB
SARS-COV-2 RNA RESP QL NAA+PROBE: NORMAL
SPECIMEN SOURCE: NORMAL

## 2021-04-26 PROCEDURE — U0003 INFECTIOUS AGENT DETECTION BY NUCLEIC ACID (DNA OR RNA); SEVERE ACUTE RESPIRATORY SYNDROME CORONAVIRUS 2 (SARS-COV-2) (CORONAVIRUS DISEASE [COVID-19]), AMPLIFIED PROBE TECHNIQUE, MAKING USE OF HIGH THROUGHPUT TECHNOLOGIES AS DESCRIBED BY CMS-2020-01-R: HCPCS | Performed by: ANESTHESIOLOGY

## 2021-04-26 PROCEDURE — U0005 INFEC AGEN DETEC AMPLI PROBE: HCPCS | Performed by: ANESTHESIOLOGY

## 2021-04-27 ENCOUNTER — DOCUMENTATION ONLY (OUTPATIENT)
Dept: ANESTHESIOLOGY | Facility: CLINIC | Age: 51
End: 2021-04-27

## 2021-04-27 LAB
LABORATORY COMMENT REPORT: NORMAL
SARS-COV-2 RNA RESP QL NAA+PROBE: NEGATIVE
SPECIMEN SOURCE: NORMAL

## 2021-04-27 RX ORDER — TRAMADOL HYDROCHLORIDE 50 MG/1
50 TABLET ORAL EVERY 6 HOURS PRN
Status: CANCELLED | OUTPATIENT
Start: 2021-04-27

## 2021-04-27 NOTE — TELECONSULT
Patient is a 49-year-old female with a past medical history of chronic low back pain.  In 2015, the patient had L4-5 right hemilaminectomy for disc degeneration and disc herniation.  However her pain worsened after the surgery.  She stated that she had disc re-herniation.  She tried to manage her pain with epidural injection which afforded good relief.  However the injections wear off quickly.  She then developed intermittent left leg numbness and numbness of her anterolateral thigh bilaterally.  She saw Dr. Humphrey Degroot at  [facility] who advised her for spinal cord stimulation.  However due to insurance changes she was unable to schedule the procedure.  She then saw Dr. Floyd of neurosurgery.  She was referred to our pain clinic and saw Dr. Aponte.  She was diagnosed with sacroiliac joint dysfunction, postlaminectomy pain syndrome, and myofascial pain dysfunction syndrome.  She had good relief with the sacroiliac joint injection however her pain continued and she elected to proceed with spinal cord stimulation trial and implantation by Dr. Aponte.  Her postoperative course was complicated by battery site pain and requirement of opioid use.  One month after implantation, she experienced a fall after tripping over her dog.  She felt back to tweaking and has continued to note pinching sensation to the superior part of the generator.  These  symptoms however improved over time.  She saw me in October 2019.  At the time,  she reported midthoracic back pain, pocket site pain, and concern of migration of the spinal cord stimulator leads because of the lack of effectiveness.  Imaging studies showed no changes in the stimulator lead position.  She was given muscle relaxant (Flexeril) and occasional tramadol.  In March 2021 she saw me again with increased back pain requests for reprogramming the device to improve her pain coverage.  At the time it  was discovered that her spinal cord stimulator battery was completely  dead. She either needed replacement of the battery or revision of the entire SCS system.  The patient states that she would like to have a Medtronic device at this time.  She has been using the burst program which is only available in the [employer] device.  After several back-and-forth conversations and her discussion with the device rep, she elected to proceed with the Medtronic system without trial.    I called patient today and ask her if she has any questions about upcoming procedure.  Discussed with her about spinal cord dorsal column stimulation (DCS) implant procedure. This is a technique used in the management of certain chronic pain syndromes. Through an implanted electrode, electricity is delivered to the posterior elements of the spinal cord in order to relieve the pain associated with postlaminectomy pain syndrome, failed-back surgery syndrome (FBSS) and complex regional pain syndrome (CRPS). The aim of DCS  is to reduce the intensity, duration, and frequency of pain associated with the chronic pain conditions. The DCS system is controlled by the patient with the use of the generator remote control. Risks and benefits of the procedure including failure to control pain, nerve injury, spinal cord injury, bleeding, infection, dural tear, post dural puncture headache discussed with the patient.    All the patient questions are answered to her satisfaction.      Nando Cam MD, PhD    [address]  5TH FLOOR  [facility] [address] [phone]

## 2021-04-28 ENCOUNTER — HOSPITAL ENCOUNTER (OUTPATIENT)
Facility: AMBULATORY SURGERY CENTER | Age: 51
Discharge: HOME | End: 2021-04-28
Attending: ANESTHESIOLOGY | Admitting: ANESTHESIOLOGY
Payer: COMMERCIAL

## 2021-04-28 ENCOUNTER — ANESTHESIA (OUTPATIENT)
Dept: SURGERY | Facility: AMBULATORY SURGERY CENTER | Age: 51
End: 2021-04-28

## 2021-04-28 ENCOUNTER — ANCILLARY PROCEDURE (OUTPATIENT)
Dept: RADIOLOGY | Facility: AMBULATORY SURGERY CENTER | Age: 51
End: 2021-04-28
Attending: ANESTHESIOLOGY
Payer: COMMERCIAL

## 2021-04-28 VITALS
RESPIRATION RATE: 16 BRPM | HEART RATE: 71 BPM | WEIGHT: 160 LBS | HEIGHT: 67 IN | SYSTOLIC BLOOD PRESSURE: 136 MMHG | TEMPERATURE: 98.3 F | BODY MASS INDEX: 25.11 KG/M2 | DIASTOLIC BLOOD PRESSURE: 94 MMHG | OXYGEN SATURATION: 100 %

## 2021-04-28 DIAGNOSIS — R52 PAIN: ICD-10-CM

## 2021-04-28 DIAGNOSIS — M96.1 POSTLAMINECTOMY SYNDROME OF LUMBAR REGION: ICD-10-CM

## 2021-04-28 LAB
GLUCOSE BLDC GLUCOMTR-MCNC: 137 MG/DL (ref 70–99)
HCG UR QL: NEGATIVE
INTERNAL QC OK POCT: YES

## 2021-04-28 PROCEDURE — 63685 INS/RPLC SPI NPG/RCVR POCKET: CPT

## 2021-04-28 PROCEDURE — 81025 URINE PREGNANCY TEST: CPT | Performed by: PATHOLOGY

## 2021-04-28 PROCEDURE — 63663 REVISE SPINE ELTRD PERQ ARAY: CPT

## 2021-04-28 DEVICE — IMP LEAD KIT VECTRIS SCS COMPACT SURESCAN 1X8MMX60CM 977A260: Type: IMPLANTABLE DEVICE | Site: BACK | Status: FUNCTIONAL

## 2021-04-28 DEVICE — GENERATOR NEUROSTIM INTELLIS MRI SURESCAN 97715: Type: IMPLANTABLE DEVICE | Site: BACK | Status: FUNCTIONAL

## 2021-04-28 RX ORDER — OXYCODONE AND ACETAMINOPHEN 5; 325 MG/1; MG/1
1 TABLET ORAL
Status: COMPLETED | OUTPATIENT
Start: 2021-04-28 | End: 2021-04-28

## 2021-04-28 RX ORDER — SODIUM CHLORIDE, SODIUM LACTATE, POTASSIUM CHLORIDE, CALCIUM CHLORIDE 600; 310; 30; 20 MG/100ML; MG/100ML; MG/100ML; MG/100ML
INJECTION, SOLUTION INTRAVENOUS CONTINUOUS
Status: DISCONTINUED | OUTPATIENT
Start: 2021-04-28 | End: 2021-04-28 | Stop reason: HOSPADM

## 2021-04-28 RX ORDER — LIDOCAINE 40 MG/G
CREAM TOPICAL
Status: DISCONTINUED | OUTPATIENT
Start: 2021-04-28 | End: 2021-04-28 | Stop reason: HOSPADM

## 2021-04-28 RX ORDER — PROPOFOL 10 MG/ML
INJECTION, EMULSION INTRAVENOUS CONTINUOUS PRN
Status: DISCONTINUED | OUTPATIENT
Start: 2021-04-28 | End: 2021-04-28

## 2021-04-28 RX ORDER — OXYCODONE AND ACETAMINOPHEN 5; 325 MG/1; MG/1
1 TABLET ORAL EVERY 6 HOURS PRN
Qty: 20 TABLET | Refills: 0 | Status: SHIPPED | OUTPATIENT
Start: 2021-04-28 | End: 2021-10-04

## 2021-04-28 RX ORDER — DEXAMETHASONE SODIUM PHOSPHATE 4 MG/ML
INJECTION, SOLUTION INTRA-ARTICULAR; INTRALESIONAL; INTRAMUSCULAR; INTRAVENOUS; SOFT TISSUE PRN
Status: DISCONTINUED | OUTPATIENT
Start: 2021-04-28 | End: 2021-04-28

## 2021-04-28 RX ORDER — OXYCODONE HYDROCHLORIDE 5 MG/1
5 TABLET ORAL EVERY 4 HOURS PRN
Status: DISCONTINUED | OUTPATIENT
Start: 2021-04-28 | End: 2021-04-29 | Stop reason: HOSPADM

## 2021-04-28 RX ORDER — BUPIVACAINE HYDROCHLORIDE AND EPINEPHRINE 5; 5 MG/ML; UG/ML
INJECTION, SOLUTION PERINEURAL PRN
Status: DISCONTINUED | OUTPATIENT
Start: 2021-04-28 | End: 2021-04-28 | Stop reason: HOSPADM

## 2021-04-28 RX ORDER — SODIUM CHLORIDE, SODIUM LACTATE, POTASSIUM CHLORIDE, CALCIUM CHLORIDE 600; 310; 30; 20 MG/100ML; MG/100ML; MG/100ML; MG/100ML
INJECTION, SOLUTION INTRAVENOUS CONTINUOUS PRN
Status: DISCONTINUED | OUTPATIENT
Start: 2021-04-28 | End: 2021-04-28

## 2021-04-28 RX ORDER — PROPOFOL 10 MG/ML
INJECTION, EMULSION INTRAVENOUS PRN
Status: DISCONTINUED | OUTPATIENT
Start: 2021-04-28 | End: 2021-04-28

## 2021-04-28 RX ORDER — NALOXONE HYDROCHLORIDE 0.4 MG/ML
0.2 INJECTION, SOLUTION INTRAMUSCULAR; INTRAVENOUS; SUBCUTANEOUS
Status: DISCONTINUED | OUTPATIENT
Start: 2021-04-28 | End: 2021-04-29 | Stop reason: HOSPADM

## 2021-04-28 RX ORDER — ACETAMINOPHEN 325 MG/1
975 TABLET ORAL ONCE
Status: COMPLETED | OUTPATIENT
Start: 2021-04-28 | End: 2021-04-28

## 2021-04-28 RX ORDER — ONDANSETRON 2 MG/ML
4 INJECTION INTRAMUSCULAR; INTRAVENOUS EVERY 30 MIN PRN
Status: DISCONTINUED | OUTPATIENT
Start: 2021-04-28 | End: 2021-04-29 | Stop reason: HOSPADM

## 2021-04-28 RX ORDER — FENTANYL CITRATE 50 UG/ML
25-50 INJECTION, SOLUTION INTRAMUSCULAR; INTRAVENOUS
Status: DISCONTINUED | OUTPATIENT
Start: 2021-04-28 | End: 2021-04-29 | Stop reason: HOSPADM

## 2021-04-28 RX ORDER — NALOXONE HYDROCHLORIDE 0.4 MG/ML
0.4 INJECTION, SOLUTION INTRAMUSCULAR; INTRAVENOUS; SUBCUTANEOUS
Status: DISCONTINUED | OUTPATIENT
Start: 2021-04-28 | End: 2021-04-29 | Stop reason: HOSPADM

## 2021-04-28 RX ORDER — SODIUM CHLORIDE, SODIUM LACTATE, POTASSIUM CHLORIDE, CALCIUM CHLORIDE 600; 310; 30; 20 MG/100ML; MG/100ML; MG/100ML; MG/100ML
INJECTION, SOLUTION INTRAVENOUS CONTINUOUS
Status: DISCONTINUED | OUTPATIENT
Start: 2021-04-28 | End: 2021-04-29 | Stop reason: HOSPADM

## 2021-04-28 RX ORDER — ONDANSETRON 2 MG/ML
INJECTION INTRAMUSCULAR; INTRAVENOUS PRN
Status: DISCONTINUED | OUTPATIENT
Start: 2021-04-28 | End: 2021-04-28

## 2021-04-28 RX ORDER — ONDANSETRON 4 MG/1
4 TABLET, ORALLY DISINTEGRATING ORAL EVERY 30 MIN PRN
Status: DISCONTINUED | OUTPATIENT
Start: 2021-04-28 | End: 2021-04-29 | Stop reason: HOSPADM

## 2021-04-28 RX ORDER — LIDOCAINE HYDROCHLORIDE 20 MG/ML
INJECTION, SOLUTION INFILTRATION; PERINEURAL PRN
Status: DISCONTINUED | OUTPATIENT
Start: 2021-04-28 | End: 2021-04-28

## 2021-04-28 RX ORDER — FENTANYL CITRATE 50 UG/ML
INJECTION, SOLUTION INTRAMUSCULAR; INTRAVENOUS PRN
Status: DISCONTINUED | OUTPATIENT
Start: 2021-04-28 | End: 2021-04-28

## 2021-04-28 RX ORDER — GABAPENTIN 300 MG/1
300 CAPSULE ORAL ONCE
Status: DISCONTINUED | OUTPATIENT
Start: 2021-04-28 | End: 2021-04-28 | Stop reason: HOSPADM

## 2021-04-28 RX ADMIN — SODIUM CHLORIDE, SODIUM LACTATE, POTASSIUM CHLORIDE, CALCIUM CHLORIDE: 600; 310; 30; 20 INJECTION, SOLUTION INTRAVENOUS at 10:53

## 2021-04-28 RX ADMIN — DEXAMETHASONE SODIUM PHOSPHATE 4 MG: 4 INJECTION, SOLUTION INTRA-ARTICULAR; INTRALESIONAL; INTRAMUSCULAR; INTRAVENOUS; SOFT TISSUE at 11:52

## 2021-04-28 RX ADMIN — FENTANYL CITRATE 25 MCG: 50 INJECTION, SOLUTION INTRAMUSCULAR; INTRAVENOUS at 12:48

## 2021-04-28 RX ADMIN — OXYCODONE AND ACETAMINOPHEN 1 TABLET: 5; 325 TABLET ORAL at 14:23

## 2021-04-28 RX ADMIN — FENTANYL CITRATE 50 MCG: 50 INJECTION, SOLUTION INTRAMUSCULAR; INTRAVENOUS at 13:49

## 2021-04-28 RX ADMIN — LIDOCAINE HYDROCHLORIDE 80 MG: 20 INJECTION, SOLUTION INFILTRATION; PERINEURAL at 11:02

## 2021-04-28 RX ADMIN — PROPOFOL 50 MG: 10 INJECTION, EMULSION INTRAVENOUS at 11:14

## 2021-04-28 RX ADMIN — FENTANYL CITRATE 25 MCG: 50 INJECTION, SOLUTION INTRAMUSCULAR; INTRAVENOUS at 14:23

## 2021-04-28 RX ADMIN — PROPOFOL 20 MG: 10 INJECTION, EMULSION INTRAVENOUS at 12:23

## 2021-04-28 RX ADMIN — ACETAMINOPHEN 975 MG: 325 TABLET ORAL at 08:56

## 2021-04-28 RX ADMIN — ONDANSETRON 4 MG: 2 INJECTION INTRAMUSCULAR; INTRAVENOUS at 11:04

## 2021-04-28 RX ADMIN — FENTANYL CITRATE 25 MCG: 50 INJECTION, SOLUTION INTRAMUSCULAR; INTRAVENOUS at 12:37

## 2021-04-28 RX ADMIN — PROPOFOL 100 MCG/KG/MIN: 10 INJECTION, EMULSION INTRAVENOUS at 11:02

## 2021-04-28 ASSESSMENT — MIFFLIN-ST. JEOR: SCORE: 1370.45

## 2021-04-28 NOTE — ANESTHESIA CARE TRANSFER NOTE
Patient: Phyllis Vela    Procedure(s):  Replacement of spinal cord stimulator electrode and placement of spinal cord stimulator generator/battery over buttock, removal of old spinal cord stimulator system    Diagnosis: Postlaminectomy syndrome of lumbar region [M96.1]  Diagnosis Additional Information: No value filed.    Anesthesia Type:   No value filed.     Note:      Level of Consciousness: awake  Oxygen Supplementation: room air    Independent Airway: airway patency satisfactory and stable        Patient transferred to: Phase II    Handoff Report: Identifed the Patient, Identified the Reponsible Provider, Reviewed the pertinent medical history, Discussed the surgical course, Reviewed Intra-OP anesthesia mangement and issues during anesthesia, Set expectations for post-procedure period and Allowed opportunity for questions and acknowledgement of understanding      Vitals: (Last set prior to Anesthesia Care Transfer)  CRNA VITALS  4/28/2021 1341 - 4/28/2021 1417      4/28/2021             Resp Rate (observed):  (!) 1        Electronically Signed By: AMIRAH Hampton CRNA  April 28, 2021  2:17 PM

## 2021-04-28 NOTE — OP NOTE
Patient: Phyllis Vela Age: 50 year old   MRN: [id number]       Date of Visit: April 28, 2021      PAIN MEDICINE OP NOTE    ATTENDING CLINICIAN:    MD Gen More MD    Pain Fellow:  Leslie Montero MD    PREPROCEDURE DIAGNOSES:  M96. 1 Postlaminectomy pain syndrome     PROCEDURE(S) PERFORMED:  1) [id number] Revision of neurostimulator lead in spinal canal, percutaneous approach   2) [id number] Revision of stimulator generator in left buttock subcutaneous tissue and fascia, open approach   3) Complex intraoperative programming of the device  4) Fluoroscopic guidance for the above-named procedure(s)  5) Interpretation of radiographic images    HARDWARE IMPLANTED/USED:  - Medtronic [id number] Lead Kit 60 cm Lot# [id number]  - Medtronic [id number] Lead Kit 60 cm Lot# [id number]  - Leads: entered left and right at T12/L1 ; tips Left on between T6/T7and right on between top of T8; Vectris SureScan MRI 1X8 compact  - AITis AdaptiveStim Serial # [id number] (Full body MRI)     ANESTHESIA:  MAC    BLOOD LOSS:  Minimal     DRAINS AND SPECIMENS:  None.    COMPLICATIONS:  None    INDICATIONS:    Patient is a 50-year-old female with a past medical history of chronic low back pain.  In 2015, the patient had L4-5 right hemilaminectomy for disc degeneration and disc herniation.  However her pain worsened after the surgery.   She tried to manage her pain with epidural injection which afforded good relief.  However the injections worn off quickly.  She then developed intermittent left leg numbness and numbness of her anterolateral thigh bilaterally.  She saw Dr. Humphrey Degroot at [facility],  who advised her for spinal cord stimulation.  However due to insurance changes, she was unable to schedule the procedure.  She then saw Dr. Folyd of neurosurgery.  She was referred to our pain clinic and saw Dr. Aponte.  She was diagnosed with sacroiliac joint dysfunction, postlaminectomy pain syndrome, and  myofascial pain dysfunction syndrome.  She had good relief with the sacroiliac joint injection, however her pain continued and she elected to proceed with spinal cord stimulation trial and implantation by Dr. Aponte.  Her postoperative course was complicated by battery site pain and requirement of opioid use.  At the time,  she also experienced a fall after tripping over her dog.  She felt back to tweaking and noted pinching sensation to the superior part of the generator.  These  symptoms however gradually improved over time.  She saw me in October 2019.  At the time,  she reported mid-thoracic back pain, pocket site pain, and concern of migration of the spinal cord stimulator leads because of the lack of effectiveness.  Imaging studies showed no changes in the stimulator lead position.  She was given muscle relaxant (Flexeril) and occasional tramadol.  In March 2021 she saw me again with increased back pain requested for reprogramming the device to improve her pain coverage.  At the time,  it  was discovered that her spinal cord stimulator battery was completely dead. She either needed replacement of the battery or revision of the entire SCS system.    She has been using the burst program which is only available in the [employer] device.  After several back-and-forth conversations and her discussion with the [employer] device rep, she elected to proceed with the [employer] SCS system implantation without a trial. Risks and benefits of the procedure including failure to control pain, nerve injury, spinal cord injury, bleeding, infection, dural tear, post dural puncture headache, death discussed with the patient.    Procedure Details:    After obtaining informed consent, a 20G IV Hep-Lock was placed in the patient upper extremity.  Patient was given IV prophylactic antibiotic Vancomycin 1.5 g mixed in 200 cc normal saline, which was infused slowly over 60 minutes prior to the incision.     The patient was then taken to  the operating room, where the patient was identified by name, medical record number and date of birth.  All of the patient s last minute questions were answered.     The patient was placed in the prone position on the operating room table. Two pillows are placed under the abdomen.  All pressure points were checked and comfortably padded.  Routine monitors were placed.  Vital signs were stable.    A chlorhexidine prep was completed followed by sterile draping per standard procedure. The surgical field is then covered by povidine-iodine-impregnated plastic adhesive antimicrobial surgical film (Ioban). Then we applied cover drape to patient's back and buttock. We applied a side drape to allow lateral fluoroscopic views during the procedure. This drape is extended along the side of the patient letting it fall over the side of the table.  The side drape  is attached other drape with Allis clips.  Then C-arm is covered with a sterile clear plastic drape.    A formal time-out procedure was performed, as per protocol, including patient name, title of procedure, and site of procedure, and all in the room concurred.      The anesthesia was initiated. An anterior-posterior fluoroscopic view was obtained to identify and estephania the midline position of the spinous processes.      We then focused on prior incision site for lead placement. A mixture of 0.5% bupivacaine and 1% lidocaine was infiltrated into the previous scar estephania, Using a 15 scalpel, a 5 cm incision was created into the previous scar.  The electrocautery, mets, forcep were used for dissection as necessary to facilitate access to the paraspinous fascia. We then dissected fibrous sheaths around the leads using Metzenbaum forceps.  Once leads are identified, they are dissected carefully.  Gentle dissection was carried out, leads were removed by gently pulling out.    A 14-gauge curved Tuohy needle was then advanced from just medial to L2 pedicle to enter T12/L1  interspace. The needle was then walked off in a superior medial direction until it entered the epidural space using loss of resistance to saline and air.  Lateral fluoroscopy images was obtained to confirm the position of the tip of between needle and the epidural space.  Aspiration was negative for heme or cerebrospinal fluid. The stylet was removed from needle.  And the spinal cord stimulation lead was advanced through the needle under direct visualization, medially and slightly to the top of T7 vertebral body.  However because of the previous scar, we are unable to reach midline of T7 level.  Multiple attempts were made at this time.  Then we decided to remove this lead completely and make a new needle insertion (straight needle) at the same level (T12-L1).  This time we use straight needle, and were able to place the lead at midline and top of T7.  Final lead position is between T6 and T7. Then we advanced 2nd lead exactly similar fashion from L2 pedicle with a 14G straight needle to enter  right T12/L1 interlaminar space.  We didn't have a difficulty in placing the lead at the side.  Lead advanced and placed at top of T8.  Images are saved in the epic.    We asked anesthesia team to wake up the patient from anesthesia for intraoperative programming. At that point intraoperative programming was performed by medtronic rep Poornima.  The patient noted adequate coverage of pain in the lower back back, buttock and  right lower extremity.       The stimulator lead was then anchored to the lumbar paraspinous fascia with 2-0 non-absorbable silk sutures.    We then approached IPG site. A mixture of 0.5% bupivacaine and 1% lidocaine was infiltrated into the previous scar estephania, Using a 15 scalpel, a 4 cm incision was created.  The  pocket  depth is approximately 2 cm skin and  was created using blunt dissection inferior to the incision site. The dissection was performed without the use of monopolar electrocautery. IPG was  exposed, the leads were disconnected. IPG was removed.  Because of the difference in the size of IPG, we decided to suture bottom of the IPG pocket to reduce the size.  This might prevent seroma formation.        At this time sedation was increased during in preparation of the tunneling.Local anesthetic was then utilized to anesthetize the tract for creating subcutaneous tunnel between the stimulator lead and the pocket.  The tunneling unit is advanced to the subcutaneous tissue by application of steady force.  The tip of the tunneling tool slightly bent and it was felt under the skin with another hand to gauze depth and to ensure that the tip is not too deep.  The handle was then removed from the straw.  Leads are then inserted through the straw and removed through the IPG site.  Ends of the leads are wiped clean with sterile water.  The leads are then inserted into the IPG.  Then the lead screws on the IPG are tightened using the torque wrench until it clicks several times.  IPG is then placed in the pocket with the letters facing toward the skin surface.  The extra length of the lead looped under the IPG and placed in the midline pocket.      At this point,  system was tested for impedance with the pulse generator placed in the subcutaneous pocket. IPG is anchored into subutaneous tissue with a 2-0 silk suture.  Images are saved in the electronic medical record. Copious irrigation of the  gluteal and abdominal incision with bacitracin containing normal saline was carried out with concurrent suction. Skin closure was then performed. The deep and subcutaneous layers were closed with interrupted 2-O Vicryl sutures with buried knots. The skin was closed with subcuticular closure with 4-O Monocryl. The skin was then covered with Steri-Strips, and dressings.    The patient was recovered from anesthesia and then brought to the recovery room.    Disposition:     The patient is instructed not to reach overhead or perform  any abrupt movement with the back, neck or arms.  The patient has been scheduled to return to the pain clinic in a week for follow up and wound check in 1 week.  The patient is to contact with the on-call physician on the pain service at any time if there are any complications including but not limited to bleeding, fever, increased back pain, weakness or paresthesia in the lower extremities.     The patient was advised to take oxycodone 5 mg q6 hours prn. 20 tablets dispensed for acute incisional pain.       She was then discharged home in stable condition.       Nando Cam MD, PhD    [address]  [address] [facility] [address] [phone]

## 2021-04-28 NOTE — DISCHARGE INSTRUCTIONS
[facility]  Home Care Following Anesthesia  For 24 hours after surgery:  1. Get plenty of rest.  A responsible adult must stay with you for at least 24 hours after you leave the surgery center.  2. Do not drive or use heavy equipment.  If you have weakness or tingling, don't drive or use heavy equipment until this feeling goes away.   3. Do not drink alcohol.   4. Avoid strenuous or risky activities.  Ask for help when climbing stairs.  5. You may feel lightheaded.  IF so, sit for a few minutes before standing.  Have someone help you get up.   6. If you have nausea (feel sick to your stomach): Drink only clear liquids such as apple juice, ginger ale, broth or 7-Up.  Rest may also help.  Be sure to drink enough fluids.  Move to a regular diet as you feel able.   7. You may have a slight fever.  Call the doctor if your fever is over 100 F (37.7 C) (taken under the tongue) or lasts longer than 24 hours.  8. You may have a dry mouth, a sore throat, muscle aches or trouble sleeping. These should go away after 24 hours.  9. Do not make important or legal decisions.   10. It is recommended to avoid smoking.            Tips for taking pain medications  To get the best pain relief possible, remember these points:    Take pain medications as directed, before pain becomes severe.    Pain medication can upset your stomach: taking it with food may help.    Constipation is a common side effect of pain medication. Drink plenty of  fluids.    Eat foods high in fiber. Take a stool softener if recommended by your doctor or pharmacist.    Do not drink alcohol, drive or operate machinery while taking pain medications.    Ask about other ways to control pain, such as with heat, ice or relaxation.    Tylenol/Acetaminophen Consumption  To help encourage the safe use of acetaminophen, the makers of TYLENOL  have lowered the maximum daily dose for single-ingredient Extra Strength TYLENOL   (acetaminophen) products sold in the [place] from 8 pills per day (4,000 mg) to 6 pills per day (3,000 mg). The dosing interval has also changed from 2 pills every 4-6 hours to 2 pills every 6 hours.    If you feel your pain relief is insufficient, you may take Tylenol/Acetaminophen in addition to your narcotic pain medication.     Be careful not to exceed 3,000 mg of Tylenol/Acetaminophen in a 24 hour period from all sources.    If you are taking extra strength Tylenol/acetaminophen (500 mg), the maximum dose is 6 tablets in 24 hours.    If you are taking regular strength acetaminophen (325 mg), the maximum dose is 9 tablets in 24 hours.    Call a doctor for any of the followin. Signs of infection (fever, growing tenderness at the surgery site, a large amount of drainage or bleeding, severe pain, foul-smelling drainage, redness, swelling).  2. It has been over 8 to 10 hours since surgery and you are still not able to urinate (pass water).  3. Headache for over 24 hours.  4. Numbness, tingling or weakness the day after surgery (if you had spinal anesthesia).  5. Signs of Covid-19 infection (temperature over 100 degrees, shortness of breath, cough, loss of taste/smell, generalized body aches, persistent headache, chills, sore throat, nausea/vomiting/diarrhea)  Your doctor is:    Dr. Nando Cam [phone]  Or dial [phone] and ask for the resident on call for:  Pain Service  For emergency care, call the:  [facility]:  [phone] (TTY for hearing impaired: [phone]      Spinal Cord Stimulator implant revision Procedure  Discharge Instructions  Nando Cam M.D.  ____________________________________________________________    Patient Name:  Phyllis Vela  Today's Date:  2021    The sedation medications you received are strong and can have the following effects:  You may experience drowsiness, temporary forgetfulness and unsteadiness while walking    It is recommended that  you have an adult with you for 6 hours today after you get home.    Do not drive for the first 24 hours after the spinal cord stimulator has been placed.    Diet and Medications    Continue with your regular diet    Resume your regular pain medication as written in your medication discharge sheet.    You may notice a decreased need for your pain medications    DO NOT drink alcoholic beverages    Activity and Lead Site Care    Please try light activities, such as walking, to see if the spinal cord stimulation helps your pain. Call device rep if you have any question about how to use stimulator.     When moving, change positions carefully    Sleep on your side or back on a firm mattress that provides support.    Make notes in your trial diary to track which programs you use and how the stimulation covers your pain    Please do not:      Raise your hands above your head    Change your dressing. Reinforce it with gauze pads only. If the dressing needs to be changed, please have it done at pain clinic.     Twist, bend or stretch your body at the waist    Sleep on your stomach    Pull on or disconnect the trial cables or leads    Lift items greater than 5 pounds    Sit for prolonged periods of time. 2 hours is the maximum.    Have any chiropractic treatments    Operate any type of machinery or equipment such as, a chain saw, electrical nailer or wood cutting tools, etc.    Shower until the next appointment     Please avoid these procedures as they may damage or infect your spinal cord stimulator:    MRI, ultrasound, diathermy, radiation, cardiac pacemakers, defibrillator and electrocautery.    Dental work or colonoscopy    Site Care:    Check your procedure site daily.    If you notice any wetness or bleeding, reinforce the dressing with gauze pads and call pain clinic (number below) to have the dressing changed    Driving:    After the first 24 hours, you may drive a motor vehicle or anything motorized for example, boat  or riding  ONLY IF the spinal cord stimulator is turned Off.    Call Device Rep  Poornima Obando  if you have questions about how to use stimulator.     Call Pain clinic @ [phone]  if you develop:    Signs of an infection: fevers, chills, increased pain, drainage, redness and swelling from the insertion site.    Headache persisting for more than 48 hours.    Unusual changes in or stopping of sensation    Painful sensations: Please stop the stimulator-Call the Doctor    Emergency situations-Go to the Emergency Department or call 911:    Sudden severe back pain    Sudden onset of leg weakness and spasms    Loss of bladder control and/or bowel function

## 2021-04-28 NOTE — ANESTHESIA POSTPROCEDURE EVALUATION
Patient: Phyllis Vela    Procedure(s):  Replacement of spinal cord stimulator electrode and placement of spinal cord stimulator generator/battery over buttock, removal of old spinal cord stimulator system    Diagnosis:Postlaminectomy syndrome of lumbar region [M96.1]  Diagnosis Additional Information: No value filed.    Anesthesia Type:  MAC    Note:  Disposition: Outpatient   Postop Pain Control: Uneventful            Sign Out: Well controlled pain   PONV: No   Neuro/Psych: Uneventful            Sign Out: Acceptable/Baseline neuro status   Airway/Respiratory: Uneventful            Sign Out: Acceptable/Baseline resp. status   CV/Hemodynamics: Uneventful            Sign Out: Acceptable CV status; No obvious hypovolemia; No obvious fluid overload   Other NRE: NONE   DID A NON-ROUTINE EVENT OCCUR? No           Last vitals:  Vitals:    04/28/21 1412 04/28/21 1430 04/28/21 1445   BP: 126/83 131/84 (!) 136/94   Pulse: 80 71 71   Resp: 18 16 16   Temp: 37.1  C (98.7  F) 37  C (98.6  F) 36.8  C (98.3  F)   SpO2: 100% 100% 100%       Last vitals prior to Anesthesia Care Transfer:  CRNA VITALS  4/28/2021 1341 - 4/28/2021 1441      4/28/2021             Resp Rate (observed):  (!) 1          Electronically Signed By: Lucas Medel MD  April 28, 2021  3:36 PM

## 2021-04-28 NOTE — INTERVAL H&P NOTE
I have reviewed the surgical (or preoperative) H&P that is linked to this encounter, and examined the patient. There are no significant changes except as noted in my pre-op assessment.

## 2021-04-29 RX ORDER — TRAMADOL HYDROCHLORIDE 50 MG/1
50 TABLET ORAL EVERY 6 HOURS PRN
Qty: 30 TABLET | Refills: 0 | Status: SHIPPED | OUTPATIENT
Start: 2021-04-29 | End: 2021-05-06

## 2021-04-29 NOTE — TELEPHONE ENCOUNTER
Verbal orders given by Dr. Cam for:    Tramadol 50 mg q6h PRN for 30 tablets     Adrianna Florence, BSN, RN

## 2021-05-05 NOTE — TELEPHONE ENCOUNTER
[facility] notified pt that prescription has been sent into her pharmacy. She verbalized understanding and declined questions.     Adrianna Florence, BSN, RN

## 2021-05-06 ENCOUNTER — TELEPHONE (OUTPATIENT)
Dept: ANESTHESIOLOGY | Facility: CLINIC | Age: 51
End: 2021-05-06

## 2021-05-06 ENCOUNTER — OFFICE VISIT (OUTPATIENT)
Dept: ANESTHESIOLOGY | Facility: CLINIC | Age: 51
End: 2021-05-06
Payer: COMMERCIAL

## 2021-05-06 VITALS
WEIGHT: 160 LBS | BODY MASS INDEX: 25.11 KG/M2 | RESPIRATION RATE: 16 BRPM | SYSTOLIC BLOOD PRESSURE: 141 MMHG | DIASTOLIC BLOOD PRESSURE: 99 MMHG | HEART RATE: 90 BPM | HEIGHT: 67 IN

## 2021-05-06 DIAGNOSIS — Z96.89 S/P INSERTION OF SPINAL CORD STIMULATOR: ICD-10-CM

## 2021-05-06 PROCEDURE — 99214 OFFICE O/P EST MOD 30 MIN: CPT | Performed by: ANESTHESIOLOGY

## 2021-05-06 RX ORDER — TRAMADOL HYDROCHLORIDE 50 MG/1
50 TABLET ORAL DAILY PRN
Qty: 30 TABLET | Refills: 0 | Status: SHIPPED | OUTPATIENT
Start: 2021-05-06 | End: 2021-10-04

## 2021-05-06 ASSESSMENT — MIFFLIN-ST. JEOR: SCORE: 1370.45

## 2021-05-06 ASSESSMENT — PAIN SCALES - GENERAL: PAINLEVEL: SEVERE PAIN (7)

## 2021-05-06 NOTE — PROGRESS NOTES
[facility] for Comprehensive Chronic Pain Management : Progress Note    Date of visit: 5/6/2021    Interval history:    Phyllis Vlea is a 50 year old female  is known to me for postlaminectomy pain syndrome.  In 2015 she had L4-L5 laminectomy.However her pain worsened after the surgery.   She tried to manage her pain with epidural injection which afforded good relief.  However the injections worn off quickly.  She then developed intermittent left leg numbness and numbness of her anterolateral thigh bilaterally.  She saw Dr. Humphrey Degroot at [facility],  who advised her for spinal cord stimulation.  However due to insurance changes, she was unable to schedule the procedure.  She then saw Dr. Floyd of neurosurgery.  She was referred to our pain clinic and saw Dr. Aponte.  She was diagnosed with sacroiliac joint dysfunction, postlaminectomy pain syndrome, and myofascial pain dysfunction syndrome.  She had good relief with the sacroiliac joint injection, however her pain continued and she elected to proceed with spinal cord stimulation trial and implantation by Dr. Aponte.  Her postoperative course was complicated by battery site pain and requirement of opioid use.  At the time,  she also experienced a fall after tripping over her dog.  She felt back to tweaking and noted pinching sensation to the superior part of the generator.  These  symptoms however gradually improved over time.  She saw me in October 2019.  At the time,  she reported mid-thoracic back pain, pocket site pain, and concern of migration of the spinal cord stimulator leads because of the lack of effectiveness.  Imaging studies showed no changes in the stimulator lead position.  She was given muscle relaxant (Flexeril) and occasional tramadol.  In March 2021 she saw me again with increased back pain requested for reprogramming the device to improve her pain coverage.  At the time,  it  was discovered that her spinal cord  "stimulator battery was completely dead. She either needed replacement of the battery or revision of the entire SCS system.    She has been using the burst program which is only available in the Abbott device.  After several back-and-forth conversations and her discussion with the Medtronic device rep, she elected to proceed with the Medtronic SCS system implantation without a trial.  This was performed on 4/28/2021.  Today she came for wound check.     Her dressings over the wound found to be dry and intact.  The Steri-Strips were well adherent. The dressings were removed and Steri-Strips are left in place.  The patient was advised not to immerse under water for another 1 week but showering is acceptable. The patient was further advised that if the patient develops any signs of infection, neurologic changes such as new numbness or weakness or any other significant acute change, the patient should call us immediately.    The Medtronic device rep [employer] then started programming the device.  Patient reports good coverage of the pain in both lower extremities.  She will be in touch with the [employer] to get complete coverage.  She reported 50% pain relief after the programming.    [place] Prescription Monitoring Program:   Reviewed. No concerns     Review of Systems:  The 14 system ROS was reviewed and was negative except what is documented above and as follows.  Any bowel or bladder problems: none  Mood: okay    Physical Exam:  Vitals:    05/06/21 0900   BP: (!) 141/99   Pulse: 90   Resp: 16   Weight: 72.6 kg (160 lb)   Height: 1.689 m (5' 6.5\")           Medications:  Current Outpatient Medications   Medication Sig Dispense Refill     albuterol (PROAIR HFA/PROVENTIL HFA/VENTOLIN HFA) 108 (90 BASE) MCG/ACT Inhaler Inhale 2 puffs into the lungs as needed       amphetamine-dextroamphetamine (ADDERALL XR) 30 MG per 24 hr capsule Take 20 mg by mouth every morning Takes two tablets in the morning equalling 40 mg a day.       " Calcium-Magnesium-Zinc 333-133-5 MG TABS per tablet Take 1 tablet by mouth every morning        cetirizine (ZYRTEC) 10 MG tablet Take 10 mg by mouth every morning        chlorhexidine (HIBICLENS) 4 % liquid Wash night before and morning of surgery, per clinic instructions 118 mL 0     cholecalciferol (VITAMIN D3) 1000 UNIT tablet Take 1,000 Units by mouth every morning        CHROMIUM PO Take 1 tablet by mouth as needed        cyclobenzaprine (FLEXERIL) 10 MG tablet TAKE 1 TABLET(10 MG) BY MOUTH EVERY NIGHT AS NEEDED FOR MUSCLE SPASMS (Patient taking differently: At Bedtime TAKE 1 TABLET(10 MG) BY MOUTH EVERY NIGHT AS NEEDED FOR MUSCLE SPASMS) 30 tablet 4     dextromethorphan-guaiFENesin (MUCINEX DM)  MG 12 hr tablet Take 1 tablet by mouth every morning       fluticasone (FLONASE) 50 MCG/ACT nasal spray 2 times daily        lidocaine (XYLOCAINE) 5 % ointment Apply 1 g topically 4 times daily as needed for moderate pain 50 g 1     metFORMIN (GLUCOPHAGE) 500 MG tablet Take 500 mg by mouth       Multiple Vitamins-Minerals (QC WOMENS DAILY MULTIVITAMIN) TABS Take 1 tablet by mouth every morning        oxyCODONE-acetaminophen (PERCOCET) 5-325 MG tablet Take 1 tablet by mouth every 6 hours as needed for severe pain (moderate to severe pain) Maximum of 4000 mg of acetaminophen in 24 hours. 20 tablet 0     traMADol (ULTRAM) 50 MG tablet Take 1 tablet (50 mg) by mouth daily as needed for severe pain 30 tablet 0     traMADol (ULTRAM) 50 MG tablet Take 0.5-1 tablets (25-50 mg) by mouth nightly as needed for severe pain 10 tablet 0       Analgesic Medications:   Medications related to Pain Management (From now, onward)    Start     Dose/Rate Route Frequency Ordered Stop    05/06/21 0000  traMADol (ULTRAM) 50 MG tablet      50 mg Oral DAILY PRN 05/06/21 1024               LABORATORY VALUES:   Recent Labs   Lab Test 04/14/21  1020 12/09/14  2250    135   POTASSIUM 4.2 4.0   CHLORIDE 104 103   CO2 30 29   ANIONGAP 2* 3    GLC 86 89   BUN 11 8   CR 0.85 0.82   SUSAN 9.0 9.0       CBC RESULTS:   Recent Labs   Lab Test 04/14/21  1020   WBC 7.9   RBC 4.48   HGB 14.7   HCT 42.8   MCV 96   MCH 32.8   MCHC 34.3   RDW 12.4          Most Recent 3 INR's:  Recent Labs   Lab Test 04/14/21  1020   INR 0.90           ASSESSMENT:       Persistent spinal pain    Status post hemilaminectomy L4-5    Status post SCS implant (Abbott device)    SCS device battery malfunction    Status post SCS revision (changing from Abbott to Medtronic)     PLAN:     1. Medications.      Tramadol 50 mg 1.5 to 1 tablet daily as needed. 30 tabs dispensed today.        2. Interventional procedures:     None at this time.    3. Labs and imaging: None needed for pain management.      4. Rehab:  The patient is encouraged to stay active as tolerated.  Advised to use abdominal binder during wake hours.      5. Psychology: No current needs.      6. Disposition:  Follow-up in 6 months or sooner if any indicated.           Nando Cam MD, PHD

## 2021-05-06 NOTE — PATIENT INSTRUCTIONS
Medications:    Dr. Cam Refilled Tramadol. Continue medication as prescribed.     Treatment planning:    Post Implant Instructions:    No Lifting over 5 pounds. No Bending, and no twisting for 6 weeks post-op.  Keep wound clean and dry.   Dr. Cam recommends a sponge bath for the next 7 days.   When you have been cleared to start showering, avoid direct water to surgical site.   Use Abdominal binder during awake hours.    Contact the clinic at anytime, for assistance with setting up a Medtronic assisted appointment if re-programming is needed.      Recommended Follow up:      6 months with Dr. Cam or sooner if indicated.        To speak with a nurse, schedule/reschedule/cancel a clinic appointment, or request a medication refill call: [phone], option #1.    You can also reach us by [employer]: [email or web address]

## 2021-05-06 NOTE — LETTER
5/6/2021       RE: Phyllis Vela  [address]  [place] [facility] [address]     Dear Colleague,    Thank you for referring your patient, Phyllis Vela, to the [facility]ENS[facility]NAG[facility] [place] [facility]. Please see a copy of my visit note below.    [facility] for Comprehensive Chronic Pain Management : Progress Note    Date of visit: 5/6/2021    Interval history:    Phyllis Vela is a 50 year old female  is known to me for postlaminectomy pain syndrome.  In 2015 she had L4-L5 laminectomy.However her pain worsened after the surgery.   She tried to manage her pain with epidural injection which afforded good relief.  However the injections worn off quickly.  She then developed intermittent left leg numbness and numbness of her anterolateral thigh bilaterally.  [facility]  who [facility] her for spinal cord stimulation.  However due to insurance changes, she was unable to schedule the procedure.  She then saw Dr. Floyd of neurosurgery.  She was referred to our pain clinic and saw Dr. Aponte.  She was diagnosed with sacroiliac joint dysfunction, postlaminectomy pain syndrome, and myofascial pain dysfunction syndrome.  She had good relief with the sacroiliac joint injection, however her pain continued and she elected to proceed with spinal cord stimulation trial and implantation by Dr. Aponte.  Her postoperative course was complicated by battery site pain and requirement of opioid use.  At the time,  she also experienced a fall after tripping over her dog.  She felt back to tweaking and noted pinching sensation to the superior part of the generator.  These  symptoms however gradually improved over time.  She saw me in October 2019.  At the time,  she reported mid-thoracic back pain, pocket site pain, and concern of migration of the spinal cord stimulator leads because of the  "lack of effectiveness.  Imaging studies showed no changes in the stimulator lead position.  She was given muscle relaxant (Flexeril) and occasional tramadol.  In March 2021 she saw me again with increased back pain requested for reprogramming the device to improve her pain coverage.  At the time,  it  was discovered that her spinal cord stimulator battery was completely dead. She either needed replacement of the battery or revision of the entire SCS system.    She has been using the burst program which is only available in the Abbott device.  After several back-and-forth conversations and her discussion with the Medtronic device rep, she elected to proceed with the Medtronic SCS system implantation without a trial.  This was performed on 4/28/2021.  Today she came for wound check.     Her dressings over the wound found to be dry and intact.  The Steri-Strips were well adherent. The dressings were removed and Steri-Strips are left in place.  The patient was advised not to immerse under water for another 1 week but showering is acceptable. The patient was further advised that if the patient develops any signs of infection, neurologic changes such as new numbness or weakness or any other significant acute change, the patient should call us immediately.    The Medtronic device rep [employer] then started programming the device.  Patient reports good coverage of the pain in both lower extremities.  She will be in touch with the [employer] to get complete coverage.  She reported 50% pain relief after the programming.    [place] Prescription Monitoring Program:   Reviewed. No concerns     Review of Systems:  The 14 system ROS was reviewed and was negative except what is documented above and as follows.  Any bowel or bladder problems: none  Mood: okay    Physical Exam:  Vitals:    05/06/21 0900   BP: (!) 141/99   Pulse: 90   Resp: 16   Weight: 72.6 kg (160 lb)   Height: 1.689 m (5' 6.5\")           Medications:  Current Outpatient " Medications   Medication Sig Dispense Refill     albuterol (PROAIR HFA/PROVENTIL HFA/VENTOLIN HFA) 108 (90 BASE) MCG/ACT Inhaler Inhale 2 puffs into the lungs as needed       amphetamine-dextroamphetamine (ADDERALL XR) 30 MG per 24 hr capsule Take 20 mg by mouth every morning Takes two tablets in the morning equalling 40 mg a day.       Calcium-Magnesium-Zinc 333-133-5 MG TABS per tablet Take 1 tablet by mouth every morning        cetirizine (ZYRTEC) 10 MG tablet Take 10 mg by mouth every morning        chlorhexidine (HIBICLENS) 4 % liquid Wash night before and morning of surgery, per clinic instructions 118 mL 0     cholecalciferol (VITAMIN D3) 1000 UNIT tablet Take 1,000 Units by mouth every morning        CHROMIUM PO Take 1 tablet by mouth as needed        cyclobenzaprine (FLEXERIL) 10 MG tablet TAKE 1 TABLET(10 MG) BY MOUTH EVERY NIGHT AS NEEDED FOR MUSCLE SPASMS (Patient taking differently: At Bedtime TAKE 1 TABLET(10 MG) BY MOUTH EVERY NIGHT AS NEEDED FOR MUSCLE SPASMS) 30 tablet 4     dextromethorphan-guaiFENesin (MUCINEX DM)  MG 12 hr tablet Take 1 tablet by mouth every morning       fluticasone (FLONASE) 50 MCG/ACT nasal spray 2 times daily        lidocaine (XYLOCAINE) 5 % ointment Apply 1 g topically 4 times daily as needed for moderate pain 50 g 1     metFORMIN (GLUCOPHAGE) 500 MG tablet Take 500 mg by mouth       Multiple Vitamins-Minerals (QC WOMENS DAILY MULTIVITAMIN) TABS Take 1 tablet by mouth every morning        oxyCODONE-acetaminophen (PERCOCET) 5-325 MG tablet Take 1 tablet by mouth every 6 hours as needed for severe pain (moderate to severe pain) Maximum of 4000 mg of acetaminophen in 24 hours. 20 tablet 0     traMADol (ULTRAM) 50 MG tablet Take 1 tablet (50 mg) by mouth daily as needed for severe pain 30 tablet 0     traMADol (ULTRAM) 50 MG tablet Take 0.5-1 tablets (25-50 mg) by mouth nightly as needed for severe pain 10 tablet 0       Analgesic Medications:   Medications related to Pain  Management (From now, onward)    Start     Dose/Rate Route Frequency Ordered Stop    05/06/21 0000  traMADol (ULTRAM) 50 MG tablet      50 mg Oral DAILY PRN 05/06/21 1024               LABORATORY VALUES:   Recent Labs   Lab Test 04/14/21  1020 12/09/14  2250    135   POTASSIUM 4.2 4.0   CHLORIDE 104 103   CO2 30 29   ANIONGAP 2* 3   GLC 86 89   BUN 11 8   CR 0.85 0.82   SUSAN 9.0 9.0       CBC RESULTS:   Recent Labs   Lab Test 04/14/21  1020   WBC 7.9   RBC 4.48   HGB 14.7   HCT 42.8   MCV 96   MCH 32.8   MCHC 34.3   RDW 12.4          Most Recent 3 INR's:  Recent Labs   Lab Test 04/14/21  1020   INR 0.90       ASSESSMENT:       Persistent spinal pain    Status post hemilaminectomy L4-5    Status post SCS implant (Abbott device)    SCS device battery malfunction    Status post SCS revision (changing from Abbott to Medtronic)     PLAN:     1. Medications.      Tramadol 50 mg 1.5 to 1 tablet daily as needed. 30 tabs dispensed today.        2. Interventional procedures:     None at this time.    3. Labs and imaging: None needed for pain management.      4. Rehab:  The patient is encouraged to stay active as tolerated.  Advised to use abdominal binder during wake hours.      5. Psychology: No current needs.      6. Disposition:  Follow-up in 6 months or sooner if any indicated.     Nando Cam MD, PHD

## 2021-05-11 NOTE — TELEPHONE ENCOUNTER
RNCC attempted to contact pt to check on status after SCS implant. Pt had post op 5/6/21. RNCC left direct number for pt to call back if needed.     Adrianna Florence, CIERAN, RN

## 2021-05-17 ENCOUNTER — TELEPHONE (OUTPATIENT)
Dept: ANESTHESIOLOGY | Facility: CLINIC | Age: 51
End: 2021-05-17

## 2021-05-17 DIAGNOSIS — G89.18 ACUTE POST-OPERATIVE PAIN: Primary | ICD-10-CM

## 2021-05-17 DIAGNOSIS — Z96.89 S/P INSERTION OF SPINAL CORD STIMULATOR: ICD-10-CM

## 2021-05-17 RX ORDER — TRAMADOL HYDROCHLORIDE 50 MG/1
50 TABLET ORAL EVERY 6 HOURS PRN
Qty: 30 TABLET | Refills: 0 | Status: SHIPPED | OUTPATIENT
Start: 2021-05-17 | End: 2021-05-27

## 2021-05-17 NOTE — TELEPHONE ENCOUNTER
Refill request    Medication: traMADol (ULTRAM) 50 MG tablet      MNPMP Checked: Yes    Last refilled 05/08/21 for 30 tablets      Last clinic appointment: 05/06/21  Next clinic appointment: Will reach out to pt to schedule      Preferred pharmacy:    [facility]- [address] [address], [place]- [address] at [facility] [address]

## 2021-05-17 NOTE — TELEPHONE ENCOUNTER
Verbal orders given by Dr. Cam to refill pt's tramadol for 30 tablets. Prescription sent into pt's pharmacy.     CIERA PrabhakarN, RN

## 2021-05-17 NOTE — TELEPHONE ENCOUNTER
RNCC left voice message for pt with update about refill. She was also advised to follow up with clinic later this week if pocket site fluid concerns do not start to improve or worsen. Pt stated (in voice message) that she has spoken to Dr. Cam about her concerns, and he instructed her to contact the clinic if she needs to be seen later this week.     CIERA PrabhakarN, RN

## 2021-05-17 NOTE — TELEPHONE ENCOUNTER
RNCC received voice message from pt regarding tramadol refill and possible appointment later this week PRN to assess pocket site fluid. Please see refill encounter 5/17/21 for additional documentation.     CIERA PrabhakarN, RN    
38.4

## 2021-05-18 ENCOUNTER — MYC MEDICAL ADVICE (OUTPATIENT)
Dept: ANESTHESIOLOGY | Facility: CLINIC | Age: 51
End: 2021-05-18

## 2021-05-18 NOTE — TELEPHONE ENCOUNTER
RNCC called pt to follow up on pictures of incision site via [employer]. Follow up in clinic with Dr. Cam on 5/20/21 recommended, pt was agreeable to 1020 appointment.     CIERA PrabhakarN, RN

## 2021-05-20 ENCOUNTER — OFFICE VISIT (OUTPATIENT)
Dept: ANESTHESIOLOGY | Facility: CLINIC | Age: 51
End: 2021-05-20
Payer: COMMERCIAL

## 2021-05-20 VITALS — HEART RATE: 87 BPM | DIASTOLIC BLOOD PRESSURE: 85 MMHG | OXYGEN SATURATION: 100 % | SYSTOLIC BLOOD PRESSURE: 135 MMHG

## 2021-05-20 DIAGNOSIS — Z96.89 S/P INSERTION OF SPINAL CORD STIMULATOR: Primary | ICD-10-CM

## 2021-05-20 PROCEDURE — 99213 OFFICE O/P EST LOW 20 MIN: CPT | Performed by: ANESTHESIOLOGY

## 2021-05-20 ASSESSMENT — PAIN SCALES - GENERAL: PAINLEVEL: MODERATE PAIN (5)

## 2021-05-20 NOTE — LETTER
5/20/2021       RE: Phyllis Vela  [address]  [facility] [address]     Dear Colleague,    Thank you for referring your patient, Phyllis Vela, to the [facility] FOR COMPREHENSIVE PAIN MANAGEMENT [place] at [facility]. Please see a copy of my visit note below.    [facility] Comprehensive Chronic Pain Management : Progress Note    Date of visit: 5/24/2021    Interval history:  Phyllis Vela is a 50 year old female  is known to me for postlaminectomy pain syndrome.  In 2015 she had L4-L5 laminectomy.However her pain worsened after the surgery.   She tried to manage her pain with epidural injection which afforded good relief.  However the injections worn off quickly.  She then developed intermittent left leg numbness and numbness of her anterolateral thigh bilaterally.  She saw Dr. Humphrey Degroot at [facility],  who advised her for spinal cord stimulation.  However due to insurance changes, she was unable to schedule the procedure.  She then saw Dr. Floyd of neurosurgery.  She was referred to our pain clinic and saw Dr. Aponte.  She was diagnosed with sacroiliac joint dysfunction, postlaminectomy pain syndrome, and myofascial pain dysfunction syndrome.  She had good relief with the sacroiliac joint injection, however her pain continued and she elected to proceed with spinal cord stimulation trial and implantation by Dr. Aponte.  Her postoperative course was complicated by battery site pain and requirement of opioid use.  At the time,  she also experienced a fall after tripping over her dog.  She felt back to tweaking and noted pinching sensation to the superior part of the generator.  These  symptoms however gradually improved over time.  She saw me in October 2019.  At the time,  she reported mid-thoracic back pain, pocket site pain, and concern of migration of the spinal cord stimulator leads because of the  lack of effectiveness.  Imaging studies showed no changes in the stimulator lead position.  She was given muscle relaxant (Flexeril) and occasional tramadol.  In March 2021 she saw me again with increased back pain requested for reprogramming the device to improve her pain coverage.  At the time,  it  was discovered that her spinal cord stimulator battery was completely dead. She either needed replacement of the battery or revision of the entire SCS system.    She has been using the burst program which is only available in the Abbott device.  After several back-and-forth conversations and her discussion with the [employer] device rep, she elected to proceed with the [employer]onic SCS system implantation without a trial.  This was performed on 4/28/2021.  Today she came for 1 month wound check.      Her dressings over the wound found to be dry and intact.  The Steri-Strips were well adherent. The dressings were removed and Steri-Strips are left in place.  Steri-Strips are removed manually.The patient was further advised that if the patient develops any signs of infection, neurologic changes such as new numbness or weakness or any other significant acute change, the patient should call us immediately.    She has been working with [employer] rep [employer] about reprogramming the device.  She is  currently getting around 60% of the pain relief.  However she would try additional treatment to see if we can get some better pain relief.     [place] Prescription Monitoring Program:   Reviewed. No concerns     Review of Systems:  The 14 system ROS was reviewed and was negative except what is documented above and as follows.  Any bowel or bladder problems: none  Mood: okay    Physical Exam:  Vitals:    05/20/21 1026   BP: 135/85   Pulse: 87   SpO2: 100%       General: Awake in no apparent distress.   Eyes: Sclerae are anicteric. PERRLA, EOMI   Neck: supple, no masses.   Lungs: unlabored.   Heart: regular rate and rhythm   Abdomen:  soft non tender.  Extremities: Pulses are well palpable, no peripheral edema.   Musculoskeletal: 5/5 muscle strength in all extremities.   Neurologic exam:Sensation intact throughout all dermatomes bilateral upper extremities and lower extremities  Psychiatric; Normal affect.   Skin: Warm and Dry.  Wound in the mid back and buttock are healing appropriately.  No drainage redness or erythema noted.    Medications:  Current Outpatient Medications   Medication Sig Dispense Refill     albuterol (PROAIR HFA/PROVENTIL HFA/VENTOLIN HFA) 108 (90 BASE) MCG/ACT Inhaler Inhale 2 puffs into the lungs as needed       amphetamine-dextroamphetamine (ADDERALL XR) 30 MG per 24 hr capsule Take 20 mg by mouth every morning Takes two tablets in the morning equalling 40 mg a day.       Calcium-Magnesium-Zinc 333-133-5 MG TABS per tablet Take 1 tablet by mouth every morning        cetirizine (ZYRTEC) 10 MG tablet Take 10 mg by mouth every morning        chlorhexidine (HIBICLENS) 4 % liquid Wash night before and morning of surgery, per clinic instructions 118 mL 0     cholecalciferol (VITAMIN D3) 1000 UNIT tablet Take 1,000 Units by mouth every morning        CHROMIUM PO Take 1 tablet by mouth as needed        cyclobenzaprine (FLEXERIL) 10 MG tablet TAKE 1 TABLET(10 MG) BY MOUTH EVERY NIGHT AS NEEDED FOR MUSCLE SPASMS (Patient taking differently: At Bedtime TAKE 1 TABLET(10 MG) BY MOUTH EVERY NIGHT AS NEEDED FOR MUSCLE SPASMS) 30 tablet 4     dextromethorphan-guaiFENesin (MUCINEX DM)  MG 12 hr tablet Take 1 tablet by mouth every morning       fluticasone (FLONASE) 50 MCG/ACT nasal spray 2 times daily        lidocaine (XYLOCAINE) 5 % ointment Apply 1 g topically 4 times daily as needed for moderate pain 50 g 1     metFORMIN (GLUCOPHAGE) 500 MG tablet Take 500 mg by mouth       Multiple Vitamins-Minerals (QC WOMENS DAILY MULTIVITAMIN) TABS Take 1 tablet by mouth every morning        oxyCODONE-acetaminophen (PERCOCET) 5-325 MG tablet  Take 1 tablet by mouth every 6 hours as needed for severe pain (moderate to severe pain) Maximum of 4000 mg of acetaminophen in 24 hours. 20 tablet 0     traMADol (ULTRAM) 50 MG tablet Take 1 tablet (50 mg) by mouth every 6 hours as needed for severe pain 30 tablet 0     traMADol (ULTRAM) 50 MG tablet Take 1 tablet (50 mg) by mouth daily as needed for severe pain 30 tablet 0     traMADol (ULTRAM) 50 MG tablet Take 0.5-1 tablets (25-50 mg) by mouth nightly as needed for severe pain 10 tablet 0       Analgesic Medications:   Medications related to Pain Management (From now, onward)    None             LABORATORY VALUES:   Recent Labs   Lab Test 04/14/21  1020 12/09/14  2250    135   POTASSIUM 4.2 4.0   CHLORIDE 104 103   CO2 30 29   ANIONGAP 2* 3   GLC 86 89   BUN 11 8   CR 0.85 0.82   SUSAN 9.0 9.0       CBC RESULTS:   Recent Labs   Lab Test 04/14/21  1020   WBC 7.9   RBC 4.48   HGB 14.7   HCT 42.8   MCV 96   MCH 32.8   MCHC 34.3   RDW 12.4          Most Recent 3 INR's:  Recent Labs   Lab Test 04/14/21  1020   INR 0.90           ASSESSMENT:       Persistent spinal pain    Status post hemilaminectomy L4-5    Status post SCS implant (Abbott device)    SCS device battery malfunction    Status post SCS revision (changing from Abbott to Medtronic)     PLAN:     1. Medications.      Tramadol 50 mg 1.5 to 1 tablet daily as needed.  No additional medication dispensed today.  Patient states that she has started weaning of the opioid.        2. Interventional procedures:     None at this time.     3. Labs and imaging: None needed for pain management.      4. Rehab:  The patient is encouraged to stay active as tolerated.  Advised to use abdominal binder during wake hours.      5. Psychology: No current needs.      6. Disposition:  Follow-up in 6 months or sooner if any indicated.        Nando Cam MD, PHD

## 2021-05-24 NOTE — PROGRESS NOTES
[facility] for Comprehensive Chronic Pain Management : Progress Note    Date of visit: 5/24/2021    Interval history:  Phyllis Vela is a 50 year old female  is known to me for postlaminectomy pain syndrome.  In 2015 she had L4-L5 laminectomy.However her pain worsened after the surgery.   She tried to manage her pain with epidural injection which afforded good relief.  However the injections worn off quickly.  She then developed intermittent left leg numbness and numbness of her anterolateral thigh bilaterally.  She saw Dr. Humphrey Degroot at [facility],  who advised her for spinal cord stimulation.  However due to insurance changes, she was unable to schedule the procedure.  She then saw Dr. Floyd of neurosurgery.  She was referred to our pain clinic and saw Dr. Aponte.  She was diagnosed with sacroiliac joint dysfunction, postlaminectomy pain syndrome, and myofascial pain dysfunction syndrome.  She had good relief with the sacroiliac joint injection, however her pain continued and she elected to proceed with spinal cord stimulation trial and implantation by Dr. Aponte.  Her postoperative course was complicated by battery site pain and requirement of opioid use.  At the time,  she also experienced a fall after tripping over her dog.  She felt back to tweaking and noted pinching sensation to the superior part of the generator.  These  symptoms however gradually improved over time.  She saw me in October 2019.  At the time,  she reported mid-thoracic back pain, pocket site pain, and concern of migration of the spinal cord stimulator leads because of the lack of effectiveness.  Imaging studies showed no changes in the stimulator lead position.  She was given muscle relaxant (Flexeril) and occasional tramadol.  In March 2021 she saw me again with increased back pain requested for reprogramming the device to improve her pain coverage.  At the time,  it  was discovered that her spinal cord  stimulator battery was completely dead. She either needed replacement of the battery or revision of the entire SCS system.    She has been using the burst program which is only available in the Abbott device.  After several back-and-forth conversations and her discussion with the [employer] device rep, she elected to proceed with the Medtronic SCS system implantation without a trial.  This was performed on 4/28/2021.  Today she came for 1 month wound check.      Her dressings over the wound found to be dry and intact.  The Steri-Strips were well adherent. The dressings were removed and Steri-Strips are left in place.  Steri-Strips are removed manually.The patient was further advised that if the patient develops any signs of infection, neurologic changes such as new numbness or weakness or any other significant acute change, the patient should call us immediately.    She has been working with [employer] rep [employer] about reprogramming the device.  She is  currently getting around 60% of the pain relief.  However she would try additional treatment to see if we can get some better pain relief.     [place] Prescription Monitoring Program:   Reviewed. No concerns     Review of Systems:  The 14 system ROS was reviewed and was negative except what is documented above and as follows.  Any bowel or bladder problems: none  Mood: okay    Physical Exam:  Vitals:    05/20/21 1026   BP: 135/85   Pulse: 87   SpO2: 100%       General: Awake in no apparent distress.   Eyes: Sclerae are anicteric. PERRLA, EOMI   Neck: supple, no masses.   Lungs: unlabored.   Heart: regular rate and rhythm   Abdomen: soft non tender.  Extremities: Pulses are well palpable, no peripheral edema.   Musculoskeletal: 5/5 muscle strength in all extremities.   Neurologic exam:Sensation intact throughout all dermatomes bilateral upper extremities and lower extremities  Psychiatric; Normal affect.   Skin: Warm and Dry.  Wound in the mid back and buttock are  healing appropriately.  No drainage redness or erythema noted.    Medications:  Current Outpatient Medications   Medication Sig Dispense Refill     albuterol (PROAIR HFA/PROVENTIL HFA/VENTOLIN HFA) 108 (90 BASE) MCG/ACT Inhaler Inhale 2 puffs into the lungs as needed       amphetamine-dextroamphetamine (ADDERALL XR) 30 MG per 24 hr capsule Take 20 mg by mouth every morning Takes two tablets in the morning equalling 40 mg a day.       Calcium-Magnesium-Zinc 333-133-5 MG TABS per tablet Take 1 tablet by mouth every morning        cetirizine (ZYRTEC) 10 MG tablet Take 10 mg by mouth every morning        chlorhexidine (HIBICLENS) 4 % liquid Wash night before and morning of surgery, per clinic instructions 118 mL 0     cholecalciferol (VITAMIN D3) 1000 UNIT tablet Take 1,000 Units by mouth every morning        CHROMIUM PO Take 1 tablet by mouth as needed        cyclobenzaprine (FLEXERIL) 10 MG tablet TAKE 1 TABLET(10 MG) BY MOUTH EVERY NIGHT AS NEEDED FOR MUSCLE SPASMS (Patient taking differently: At Bedtime TAKE 1 TABLET(10 MG) BY MOUTH EVERY NIGHT AS NEEDED FOR MUSCLE SPASMS) 30 tablet 4     dextromethorphan-guaiFENesin (MUCINEX DM)  MG 12 hr tablet Take 1 tablet by mouth every morning       fluticasone (FLONASE) 50 MCG/ACT nasal spray 2 times daily        lidocaine (XYLOCAINE) 5 % ointment Apply 1 g topically 4 times daily as needed for moderate pain 50 g 1     metFORMIN (GLUCOPHAGE) 500 MG tablet Take 500 mg by mouth       Multiple Vitamins-Minerals (QC WOMENS DAILY MULTIVITAMIN) TABS Take 1 tablet by mouth every morning        oxyCODONE-acetaminophen (PERCOCET) 5-325 MG tablet Take 1 tablet by mouth every 6 hours as needed for severe pain (moderate to severe pain) Maximum of 4000 mg of acetaminophen in 24 hours. 20 tablet 0     traMADol (ULTRAM) 50 MG tablet Take 1 tablet (50 mg) by mouth every 6 hours as needed for severe pain 30 tablet 0     traMADol (ULTRAM) 50 MG tablet Take 1 tablet (50 mg) by mouth daily  as needed for severe pain 30 tablet 0     traMADol (ULTRAM) 50 MG tablet Take 0.5-1 tablets (25-50 mg) by mouth nightly as needed for severe pain 10 tablet 0       Analgesic Medications:   Medications related to Pain Management (From now, onward)    None             LABORATORY VALUES:   Recent Labs   Lab Test 04/14/21  1020 12/09/14  2250    135   POTASSIUM 4.2 4.0   CHLORIDE 104 103   CO2 30 29   ANIONGAP 2* 3   GLC 86 89   BUN 11 8   CR 0.85 0.82   SUSAN 9.0 9.0       CBC RESULTS:   Recent Labs   Lab Test 04/14/21  1020   WBC 7.9   RBC 4.48   HGB 14.7   HCT 42.8   MCV 96   MCH 32.8   MCHC 34.3   RDW 12.4          Most Recent 3 INR's:  Recent Labs   Lab Test 04/14/21  1020   INR 0.90           ASSESSMENT:       Persistent spinal pain    Status post hemilaminectomy L4-5    Status post SCS implant (Abbott device)    SCS device battery malfunction    Status post SCS revision (changing from Abbott to Medtronic)     PLAN:     1. Medications.      Tramadol 50 mg 1.5 to 1 tablet daily as needed.  No additional medication dispensed today.  Patient states that she has started weaning of the opioid.        2. Interventional procedures:     None at this time.     3. Labs and imaging: None needed for pain management.      4. Rehab:  The patient is encouraged to stay active as tolerated.  Advised to use abdominal binder during wake hours.      5. Psychology: No current needs.      6. Disposition:  Follow-up in 6 months or sooner if any indicated.           Nando Cam MD, PHD

## 2021-05-27 ENCOUNTER — TELEPHONE (OUTPATIENT)
Dept: ANESTHESIOLOGY | Facility: CLINIC | Age: 51
End: 2021-05-27

## 2021-05-27 DIAGNOSIS — G89.18 ACUTE POST-OPERATIVE PAIN: ICD-10-CM

## 2021-05-27 DIAGNOSIS — Z96.89 S/P INSERTION OF SPINAL CORD STIMULATOR: ICD-10-CM

## 2021-05-27 RX ORDER — TRAMADOL HYDROCHLORIDE 50 MG/1
50 TABLET ORAL EVERY 6 HOURS PRN
Qty: 30 TABLET | Refills: 0 | Status: SHIPPED | OUTPATIENT
Start: 2021-05-27 | End: 2021-06-11

## 2021-05-27 NOTE — TELEPHONE ENCOUNTER
Refill request    Medication: Tramadol Hcl 50 Mg Tablet      MNPMP Checked: Yes    Last refilled 05/17/21 for 30 tablets      Last clinic appointment: 5/06/21  Next clinic appointment: Will reach out to pt to schedule      Preferred pharmacy:    [facility]- [place], [place]- [address] at [facility] [address]

## 2021-09-19 ENCOUNTER — HEALTH MAINTENANCE LETTER (OUTPATIENT)
Age: 51
End: 2021-09-19

## 2021-09-22 ENCOUNTER — TELEPHONE (OUTPATIENT)
Dept: ANESTHESIOLOGY | Facility: CLINIC | Age: 51
End: 2021-09-22

## 2021-09-22 NOTE — TELEPHONE ENCOUNTER
EMT called patient, and left a message.      EMTcalled patient and informed her that the requested prescription was filled.     Pt informed EMT that she had scheduled an appointment with JESSICA Palmer on 10/4/21 @ 10:30AM       James Jules EMT

## 2021-10-01 NOTE — PROGRESS NOTES
[facility] Pain Management     Date of visit: 10/4/2021      Assessment:   Phyllis Vela is a 50 year old female  is known to me for postlaminectomy pain syndrome.  In 2015 she had L4-L5 laminectomy. However her pain worsened after the surgery. SHe is s/p SCS implant.        Visit Diagnoses:  1. Lumbar radiculopathy    2. Back muscle spasm    3. S/P insertion of spinal cord stimulator    4. Myofascial pain    5. Opioid contract exists        Plan:    1. Medications.        Phyllis reports excellent pain benefit with Tramadol and notes this medication allows her to be more active. Last refill was from June of this year. She is interested in continuing. It looks prn use of Tramadol for severe pain was part of Dr. Cam's plan. Refilled. Tramadol 50 mg 1/2 tab to 1 tab daily as needed. Urine drug screen and controlled substance agreement today.     Flexeril mg at bedtime- refilled today.      2. Interventional procedures:     Advised Phyllis contact [employer] for SCS reprogramming.      3. Labs and imaging: None needed for pain management.      4. Rehab:  rehab physical therapy with myofascial release ordered today to help with low back pain and scar tissue. [place] or [place] location.      5. Psychology: No current needs.      6. Disposition:  Follow-up with Dr. Cam in 3-6 months or sooner if any indicated.       Annette MACARIO [facility] Pain Management     -------------------------------------------------    Subjective:    Chief complaint:   Chief Complaint   Patient presents with     RECHECK     UMP RETURN, RM 10, patient reports, 5/10 pain in her lower back down to right leg       Interval history:  Phyllis Vela is a 51 year old female last seen on 5/20/2021.  she is a patient of Dr. Cam seen in follow up.     Recommendations/plan at the last visit included:  1. Medications.      Tramadol 50 mg 1.5 to 1 tablet daily as needed.  No additional medication dispensed today.  Patient  states that she has started weaning of the opioid.        2. Interventional procedures:     None at this time.     3. Labs and imaging: None needed for pain management.      4. Rehab:  The patient is encouraged to stay active as tolerated.  Advised to use abdominal binder during wake hours.      5. Psychology: No current needs.      6. Disposition:  Follow-up in 6 months or sooner if any indicated.             Since her last visit, Phyllis Vela reports:  -Her pain is about the same as it was at last visit.   -Her bilateral low back pain remains most bothersome, also continues to have right low calf pain as well.   -She had the Medtronic SCS placed in April of this year. Unfortunately she does not think that this SCS is as effective as previous [employer] Mor[employer] implant. She notes her current SCS hasn't been reprogrammed in a few months, knows she needs to have this done.   -She notes increased physical activity lately, has been doing more on a regular basis.  -She has taken Tramadol as needed for severe pain with good benefit, she would like a refill of this today. She states she usually takes about 20 tabs in 12 months. She took Percocet for only a short period of time after procedure but didn't like how it made her feel.   -Of note she has tried natural gummies that have been helpful for pain. She is not interested in certification for medical marijuana, states that it is too expensive.    HPI per Dr. Cam:  Phyllis Vela is a 50 year old female  is known to me for postlaminectomy pain syndrome.  In 2015 she had L4-L5 laminectomy.However her pain worsened after the surgery.   She tried to manage her pain with epidural injection which afforded good relief.  However the injections worn off quickly.  She then developed intermittent left leg numbness and numbness of her anterolateral thigh bilaterally.  She saw Dr. Humphrey Degroot at [facility],  who advised her for spinal cord stimulation.  However due to  insurance changes, she was unable to schedule the procedure.  She then saw Dr. Floyd of neurosurgery.  She was referred to our pain clinic and saw Dr. Aponte.  She was diagnosed with sacroiliac joint dysfunction, postlaminectomy pain syndrome, and myofascial pain dysfunction syndrome.  She had good relief with the sacroiliac joint injection, however her pain continued and she elected to proceed with spinal cord stimulation trial and implantation by Dr. Aponte.  Her postoperative course was complicated by battery site pain and requirement of opioid use.  At the time,  she also experienced a fall after tripping over her dog.  She felt back to tweaking and noted pinching sensation to the superior part of the generator.  These  symptoms however gradually improved over time.  She saw me in October 2019.  At the time,  she reported mid-thoracic back pain, pocket site pain, and concern of migration of the spinal cord stimulator leads because of the lack of effectiveness.  Imaging studies showed no changes in the stimulator lead position.  She was given muscle relaxant (Flexeril) and occasional tramadol.  In March 2021 she saw me again with increased back pain requested for reprogramming the device to improve her pain coverage.  At the time,  it  was discovered that her spinal cord stimulator battery was completely dead. She either needed replacement of the battery or revision of the entire SCS system.    She has been using the burst program which is only available in the [employer] device.  After several back-and-forth conversations and her discussion with the [employer] device rep, she elected to proceed with the [employer] SCS system implantation without a trial.  This was performed on 4/28/2021.  Today she came for 1 month wound check.      Her dressings over the wound found to be dry and intact.  The Steri-Strips were well adherent. The dressings were removed and Steri-Strips are left in place.  Steri-Strips are removed  manually.The patient was further advised that if the patient develops any signs of infection, neurologic changes such as new numbness or weakness or any other significant acute change, the patient should call us immediately.     She has been working with [employer] rep [employer] about reprogramming the device.  She is  currently getting around 60% of the pain relief.  However she would try additional treatment to see if we can get some better pain relief.    Pain Information:   Pain rating: averages 5/10 on a 0-10 scale.    Current pain medications:    Flexeril 10mg at bedtime prn   Tramadol 5mg prn     Current MME: 0    Review of [place] Prescription Monitoring Program (): No concern for abuse or misuse of controlled medications based on this report.     Annual Controlled Substance Agreement/UDS due date: not on file, completing today      Medications:  Current Outpatient Medications   Medication Sig Dispense Refill     albuterol (PROAIR HFA/PROVENTIL HFA/VENTOLIN HFA) 108 (90 BASE) MCG/ACT Inhaler Inhale 2 puffs into the lungs as needed       amphetamine-dextroamphetamine (ADDERALL XR) 30 MG per 24 hr capsule Take 20 mg by mouth every morning Takes two tablets in the morning equalling 40 mg a day.       Calcium-Magnesium-Zinc 333-133-5 MG TABS per tablet Take 1 tablet by mouth every morning        cetirizine (ZYRTEC) 10 MG tablet Take 10 mg by mouth every morning        chlorhexidine (HIBICLENS) 4 % liquid Wash night before and morning of surgery, per clinic instructions 118 mL 0     cholecalciferol (VITAMIN D3) 1000 UNIT tablet Take 1,000 Units by mouth every morning        CHROMIUM PO Take 1 tablet by mouth as needed        cyclobenzaprine (FLEXERIL) 10 MG tablet Take 1 tablet (10 mg) by mouth nightly as needed for muscle spasms 30 tablet 3     dextromethorphan-guaiFENesin (MUCINEX DM)  MG 12 hr tablet Take 1 tablet by mouth every morning       fluticasone (FLONASE) 50 MCG/ACT nasal spray 2 times daily         lidocaine (XYLOCAINE) 5 % ointment Apply 1 g topically 4 times daily as needed for moderate pain 50 g 1     metFORMIN (GLUCOPHAGE) 500 MG tablet Take 500 mg by mouth       Multiple Vitamins-Minerals (QC WOMENS DAILY MULTIVITAMIN) TABS Take 1 tablet by mouth every morning        traMADol (ULTRAM) 50 MG tablet Take 1 tablet (50 mg) by mouth every 6 hours as needed for severe pain 30 tablet 0       Medical History: any changes in medical history since they were last seen? No    Review of Systems: A 10-point review of systems was negative, with the exception of chronic pain issues.      Objective:    Physical Exam:  Blood pressure (!) 147/91, pulse 91, SpO2 100 %, not currently breastfeeding.  Constitutional: Well developed, well nourished, appears stated age.  Gait: Normal  HEENT: Head atraumatic, normocephalic. Eyes without conjunctival injection or jaundice. Oropharynx clear. Neck supple. No obvious neck masses.  Skin: No rash, lesions, or petechiae of exposed skin.   Psychiatric/mental status: Alert, without lethargy or stupor. Speech fluent. Appropriate affect. Mood normal. Able to follow commands without difficulty.       Phyllis to follow up with Primary Care provider regarding elevated blood pressure.    BILLING TIME DOCUMENTATION:   The total TIME spent on this patient on the date of the encounter/appointment was 35 minutes.      TOTAL TIME includes:   Time spent preparing to see the patient (reviewing records and tests)   Time spent face to face (or over the phone) with the patient   Time spent ordering tests, medications, procedures and referrals   Time spent Referring and communicating with other healthcare professionals   Time spent documenting clinical information in Epic

## 2021-10-04 ENCOUNTER — OFFICE VISIT (OUTPATIENT)
Dept: ANESTHESIOLOGY | Facility: CLINIC | Age: 51
End: 2021-10-04
Payer: COMMERCIAL

## 2021-10-04 VITALS — DIASTOLIC BLOOD PRESSURE: 91 MMHG | OXYGEN SATURATION: 100 % | HEART RATE: 91 BPM | SYSTOLIC BLOOD PRESSURE: 147 MMHG

## 2021-10-04 DIAGNOSIS — M62.830 BACK MUSCLE SPASM: ICD-10-CM

## 2021-10-04 DIAGNOSIS — M79.18 MYOFASCIAL PAIN: ICD-10-CM

## 2021-10-04 DIAGNOSIS — M54.16 LUMBAR RADICULOPATHY: Primary | ICD-10-CM

## 2021-10-04 DIAGNOSIS — Z96.89 S/P INSERTION OF SPINAL CORD STIMULATOR: ICD-10-CM

## 2021-10-04 DIAGNOSIS — Z79.891 OPIOID CONTRACT EXISTS: ICD-10-CM

## 2021-10-04 LAB — CREAT UR-MCNC: 29 MG/DL

## 2021-10-04 PROCEDURE — 99203 OFFICE O/P NEW LOW 30 MIN: CPT | Performed by: NURSE PRACTITIONER

## 2021-10-04 PROCEDURE — 82570 ASSAY OF URINE CREATININE: CPT | Mod: 59 | Performed by: PATHOLOGY

## 2021-10-04 PROCEDURE — 80307 DRUG TEST PRSMV CHEM ANLYZR: CPT | Mod: 90 | Performed by: PATHOLOGY

## 2021-10-04 RX ORDER — TRAMADOL HYDROCHLORIDE 50 MG/1
50 TABLET ORAL EVERY 6 HOURS PRN
Qty: 30 TABLET | Refills: 0 | Status: SHIPPED | OUTPATIENT
Start: 2021-10-04 | End: 2022-06-01

## 2021-10-04 RX ORDER — CYCLOBENZAPRINE HCL 10 MG
10 TABLET ORAL
Qty: 30 TABLET | Refills: 3 | Status: SHIPPED | OUTPATIENT
Start: 2021-10-04 | End: 2022-01-31

## 2021-10-04 ASSESSMENT — PAIN SCALES - GENERAL: PAINLEVEL: MODERATE PAIN (5)

## 2021-10-04 NOTE — PATIENT INSTRUCTIONS
1. Medications.      Tramadol 50 mg 1/2 tab to 1 tab daily as needed- refilled today.     Flexeril mg at bedtime- refilled today. Urine drug screen and controlled substance agreement today.      2. Interventional procedures:     Contact [employer] for SCS reprogramming.      3. Labs and imaging: None needed for pain management.      4. Rehab:  rehab physical therapy with myofascial release ordered today. See if available at [place] or [place] location.      5. Psychology: No current needs.      6. Disposition:  Follow-up with Dr. Cam in 3-6 months or sooner if any indicated.     7. Controlled substance agreement and urine drug screen performed today. A copy of the CSA was provided to you for your records.

## 2021-10-04 NOTE — LETTER
10/4/2021       RE: Phyllis Vela  [address]  [place] [address]     Dear Colleague,    Thank you for referring your patient, Phyllis Vela, to the [facility] [place] at . Please see a copy of my visit note below.    [facility] Pain Management     Date of visit: 10/4/2021      Assessment:   Phyllis Vela is a 50 year old female  is known to me for postlaminectomy pain syndrome.  In 2015 she had L4-L5 laminectomy. However her pain worsened after the surgery. SHe is s/p SCS implant.        Visit Diagnoses:  1. Lumbar radiculopathy    2. Back muscle spasm    3. S/P insertion of spinal cord stimulator    4. Myofascial pain    5. Opioid contract exists        Plan:    1. Medications.        Phyllis reports excellent pain benefit with Tramadol and notes this medication allows her to be more active. Last refill was from June of this year. She is interested in continuing. It looks prn use of Tramadol for severe pain was part of Dr. Cam's plan. Refilled. Tramadol 50 mg 1/2 tab to 1 tab daily as needed. Urine drug screen and controlled substance agreement today.     Flexeril mg at bedtime- refilled today.      2. Interventional procedures:     Advised Phyllis contact [employer] for SCS reprogramming.      3. Labs and imaging: None needed for pain management.      4. Rehab:  rehab physical therapy with myofascial release ordered today to help with low back pain and scar tissue. [place] or [place] location.      5. Psychology: No current needs.      6. Disposition:  Follow-up with Dr. Cam in 3-6 months or sooner if any indicated.       Annette MACARIO CNP  [facility] Pain Management     -------------------------------------------------    Subjective:    Chief complaint:   Chief Complaint   Patient presents with     RECHECK     UMP RETURN, RM 10, patient reports, 5/10 pain in her lower  back down to right leg       Interval history:  Phyllis Vela is a 51 year old female last seen on 5/20/2021.  she is a patient of Dr. Cam seen in follow up.     Recommendations/plan at the last visit included:  1. Medications.      Tramadol 50 mg 1.5 to 1 tablet daily as needed.  No additional medication dispensed today.  Patient states that she has started weaning of the opioid.        2. Interventional procedures:     None at this time.     3. Labs and imaging: None needed for pain management.      4. Rehab:  The patient is encouraged to stay active as tolerated.  Advised to use abdominal binder during wake hours.      5. Psychology: No current needs.      6. Disposition:  Follow-up in 6 months or sooner if any indicated.             Since her last visit, Phyllis Vela reports:  -Her pain is about the same as it was at last visit.   -Her bilateral low back pain remains most bothersome, also continues to have right low calf pain as well.   -She had the Medtronic SCS placed in April of this year. Unfortunately she does not think that this SCS is as effective as previous [employer] implant. She notes her current SCS hasn't been reprogrammed in a few months, knows she needs to have this done.   -She notes increased physical activity lately, has been doing more on a regular basis.  -She has taken Tramadol as needed for severe pain with good benefit, she would like a refill of this today. She states she usually takes about 20 tabs in 12 months. She took Percocet for only a short period of time after procedure but didn't like how it made her feel.   -Of note she has tried natural gummies that have been helpful for pain. She is not interested in certification for medical marijuana, states that it is too expensive.    HPI per Dr. Cam:  Phyllis Vela is a 50 year old female  is known to me for postlaminectomy pain syndrome.  In 2015 she had L4-L5 laminectomy.However her pain worsened after the surgery.   She  tried to manage her pain with epidural injection which afforded good relief.  However the injections worn off quickly.  She then developed intermittent left leg numbness and numbness of her anterolateral thigh bilaterally.  She saw Dr. Humphrey Degroot at [facility],  who advised her for spinal cord stimulation.  However due to insurance changes, she was unable to schedule the procedure.  She then saw Dr. Floyd of neurosurgery.  She was referred to our pain clinic and saw Dr. Aponte.  She was diagnosed with sacroiliac joint dysfunction, postlaminectomy pain syndrome, and myofascial pain dysfunction syndrome.  She had good relief with the sacroiliac joint injection, however her pain continued and she elected to proceed with spinal cord stimulation trial and implantation by Dr. Aponte.  Her postoperative course was complicated by battery site pain and requirement of opioid use.  At the time,  she also experienced a fall after tripping over her dog.  She felt back to tweaking and noted pinching sensation to the superior part of the generator.  These  symptoms however gradually improved over time.  She saw me in October 2019.  At the time,  she reported mid-thoracic back pain, pocket site pain, and concern of migration of the spinal cord stimulator leads because of the lack of effectiveness.  Imaging studies showed no changes in the stimulator lead position.  She was given muscle relaxant (Flexeril) and occasional tramadol.  In March 2021 she saw me again with increased back pain requested for reprogramming the device to improve her pain coverage.  At the time,  it  was discovered that her spinal cord stimulator battery was completely dead. She either needed replacement of the battery or revision of the entire SCS system.    She has been using the burst program which is only available in the [employer] device.  After several back-and-forth conversations and her discussion with the [employer] device rep, she elected to  proceed with the [employer] SCS system implantation without a trial.  This was performed on 4/28/2021.  Today she came for 1 month wound check.      Her dressings over the wound found to be dry and intact.  The Steri-Strips were well adherent. The dressings were removed and Steri-Strips are left in place.  Steri-Strips are removed manually.The patient was further advised that if the patient develops any signs of infection, neurologic changes such as new numbness or weakness or any other significant acute change, the patient should call us immediately.     She has been working with [employer] rep Noguera about reprogramming the device.  She is  currently getting around 60% of the pain relief.  However she would try additional treatment to see if we can get some better pain relief.    Pain Information:   Pain rating: averages 5/10 on a 0-10 scale.    Current pain medications:    Flexeril 10mg at bedtime prn   Tramadol 5mg prn     Current MME: 0    Review of [place] Prescription Monitoring Program (): No concern for abuse or misuse of controlled medications based on this report.     Annual Controlled Substance Agreement/UDS due date: not on file, completing today      Medications:  Current Outpatient Medications   Medication Sig Dispense Refill     albuterol (PROAIR HFA/PROVENTIL HFA/VENTOLIN HFA) 108 (90 BASE) MCG/ACT Inhaler Inhale 2 puffs into the lungs as needed       amphetamine-dextroamphetamine (ADDERALL XR) 30 MG per 24 hr capsule Take 20 mg by mouth every morning Takes two tablets in the morning equalling 40 mg a day.       Calcium-Magnesium-Zinc 333-133-5 MG TABS per tablet Take 1 tablet by mouth every morning        cetirizine (ZYRTEC) 10 MG tablet Take 10 mg by mouth every morning        chlorhexidine (HIBICLENS) 4 % liquid Wash night before and morning of surgery, per clinic instructions 118 mL 0     cholecalciferol (VITAMIN D3) 1000 UNIT tablet Take 1,000 Units by mouth every morning        CHROMIUM PO  Take 1 tablet by mouth as needed        cyclobenzaprine (FLEXERIL) 10 MG tablet Take 1 tablet (10 mg) by mouth nightly as needed for muscle spasms 30 tablet 3     dextromethorphan-guaiFENesin (MUCINEX DM)  MG 12 hr tablet Take 1 tablet by mouth every morning       fluticasone (FLONASE) 50 MCG/ACT nasal spray 2 times daily        lidocaine (XYLOCAINE) 5 % ointment Apply 1 g topically 4 times daily as needed for moderate pain 50 g 1     metFORMIN (GLUCOPHAGE) 500 MG tablet Take 500 mg by mouth       Multiple Vitamins-Minerals (QC WOMENS DAILY MULTIVITAMIN) TABS Take 1 tablet by mouth every morning        traMADol (ULTRAM) 50 MG tablet Take 1 tablet (50 mg) by mouth every 6 hours as needed for severe pain 30 tablet 0       Medical History: any changes in medical history since they were last seen? No    Review of Systems: A 10-point review of systems was negative, with the exception of chronic pain issues.      Objective:    Physical Exam:  Blood pressure (!) 147/91, pulse 91, SpO2 100 %, not currently breastfeeding.  Constitutional: Well developed, well nourished, appears stated age.  Gait: Normal  HEENT: Head atraumatic, normocephalic. Eyes without conjunctival injection or jaundice. Oropharynx clear. Neck supple. No obvious neck masses.  Skin: No rash, lesions, or petechiae of exposed skin.   Psychiatric/mental status: Alert, without lethargy or stupor. Speech fluent. Appropriate affect. Mood normal. Able to follow commands without difficulty.       Phyllis to follow up with Primary Care provider regarding elevated blood pressure.    BILLING TIME DOCUMENTATION:   The total TIME spent on this patient on the date of the encounter/appointment was 35 minutes.      TOTAL TIME includes:   Time spent preparing to see the patient (reviewing records and tests)   Time spent face to face (or over the phone) with the patient   Time spent ordering tests, medications, procedures and referrals   Time spent Referring and  communicating with other healthcare professionals   Time spent documenting clinical information in Epic               Again, thank you for allowing me to participate in the care of your patient.      Sincerely,    AMIRAH Grace CNP

## 2021-10-04 NOTE — NURSING NOTE
Patient presents with:  RECHECK: UMP RETURN, RM 10, patient reports, 5/10 pain in her lower back down to right leg      Moderate Pain (5)     Pain Medications     Opioid Combinations Refills Start End     oxyCODONE-acetaminophen (PERCOCET) 5-325 MG tablet    0 4/28/2021     Sig - Route: Take 1 tablet by mouth every 6 hours as needed for severe pain (moderate to severe pain) Maximum of 4000 mg of acetaminophen in 24 hours. - Oral    Class: E-Prescribe    Earliest Fill Date: 4/27/2021    Prior authorization: Closed - Prior Authorization not required for patient/medication    Opioid Agonists Refills Start End     traMADol (ULTRAM) 50 MG tablet    0 6/11/2021     Sig - Route: Take 1 tablet (50 mg) by mouth every 6 hours as needed for severe pain - Oral    Class: Local Print     traMADol (ULTRAM) 50 MG tablet    0 5/6/2021     Sig - Route: Take 1 tablet (50 mg) by mouth daily as needed for severe pain - Oral    Class: E-Prescribe     traMADol (ULTRAM) 50 MG tablet    0 3/4/2021     Sig - Route: Take 0.5-1 tablets (25-50 mg) by mouth nightly as needed for severe pain - Oral    Class: E-Prescribe    Prior authorization: Approved          What medications are you using for pain?   Tramadol, flexril        (Return Patients only) What refills are you needing today? Tramadol, flexril      James Jules, EMT

## 2021-10-06 LAB
AMPHET UR CFM-MCNC: 5120 NG/ML
AMPHET/CREAT UR: ABNORMAL NG/MG {CREAT}
CREATININE URINE MG/DL  (SYNCED VALUE): 29 MG/DL

## 2021-10-25 ENCOUNTER — THERAPY VISIT (OUTPATIENT)
Dept: PHYSICAL THERAPY | Facility: CLINIC | Age: 51
End: 2021-10-25
Attending: NURSE PRACTITIONER
Payer: COMMERCIAL

## 2021-10-25 DIAGNOSIS — M79.18 MYOFASCIAL PAIN: ICD-10-CM

## 2021-10-25 PROCEDURE — 97140 MANUAL THERAPY 1/> REGIONS: CPT | Mod: GP | Performed by: PHYSICAL THERAPIST

## 2021-10-25 PROCEDURE — 97161 PT EVAL LOW COMPLEX 20 MIN: CPT | Mod: GP | Performed by: PHYSICAL THERAPIST
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
## 2021-11-01 ENCOUNTER — THERAPY VISIT (OUTPATIENT)
Dept: PHYSICAL THERAPY | Facility: CLINIC | Age: 51
End: 2021-11-01
Payer: COMMERCIAL

## 2021-11-01 DIAGNOSIS — M79.18 MYOFASCIAL PAIN: ICD-10-CM

## 2021-11-01 PROCEDURE — 97110 THERAPEUTIC EXERCISES: CPT | Mod: GP | Performed by: PHYSICAL THERAPIST

## 2021-11-01 PROCEDURE — 97140 MANUAL THERAPY 1/> REGIONS: CPT | Mod: GP | Performed by: PHYSICAL THERAPIST

## 2021-11-08 ENCOUNTER — THERAPY VISIT (OUTPATIENT)
Dept: PHYSICAL THERAPY | Facility: CLINIC | Age: 51
End: 2021-11-08
Payer: COMMERCIAL

## 2021-11-08 DIAGNOSIS — M79.18 MYOFASCIAL PAIN: ICD-10-CM

## 2021-11-08 PROCEDURE — 97110 THERAPEUTIC EXERCISES: CPT | Mod: GP | Performed by: PHYSICAL THERAPIST

## 2021-11-08 PROCEDURE — 97140 MANUAL THERAPY 1/> REGIONS: CPT | Mod: GP | Performed by: PHYSICAL THERAPIST

## 2021-11-08 NOTE — PROGRESS NOTES
Subjective:  HPI  Physical Exam                    Objective:  System    Physical Exam    General     ROS    Assessment/Plan:    SUBJECTIVE  Subjective changes as noted by pt:  Pt notes decreased pain and increased flexibility     Current pain level: 4/10     Changes in function:  Pt notes increased ease with ambulation     Adverse reaction to treatment or activity:  None    OBJECTIVE  Changes in objective findings:  Improved tissue mobility over scars. Decreased muscle guarding lumbar paraspinals        ASSESSMENT  Phyllis continues to require intervention to meet STG and LTG's: PT  Patient's symptoms are resolving.  Response to therapy has shown an improvement in  pain level and function  Progress made towards STG/LTG?  Yes,     PLAN  Current treatment program is being advanced to more complex exercises.    PTA/ATC plan:  N/A    Please refer to the daily flowsheet for treatment today, total treatment time and time spent performing 1:1 timed codes.

## 2021-11-29 ENCOUNTER — THERAPY VISIT (OUTPATIENT)
Dept: PHYSICAL THERAPY | Facility: CLINIC | Age: 51
End: 2021-11-29
Payer: COMMERCIAL

## 2021-11-29 DIAGNOSIS — M79.18 MYOFASCIAL PAIN: ICD-10-CM

## 2021-11-29 PROCEDURE — 97110 THERAPEUTIC EXERCISES: CPT | Mod: GP | Performed by: PHYSICAL THERAPIST

## 2021-11-29 PROCEDURE — 97140 MANUAL THERAPY 1/> REGIONS: CPT | Mod: GP | Performed by: PHYSICAL THERAPIST

## 2021-11-29 NOTE — PROGRESS NOTES
Subjective:  HPI  Physical Exam                    Objective:  System    Physical Exam    General     ROS    Assessment/Plan:    SUBJECTIVE  Subjective changes as noted by pt:  Pt still notes tightness lower thoracic region bilaterally. Back of the shoulders are sore today secondary to starting weightlifting program at home.      Current pain level: 4/10     Changes in function:  Pt notes tightness and fatigued with prolonged standing at work.      Adverse reaction to treatment or activity:  None    OBJECTIVE  Changes in objective findings:  Hamstring flexibility improving. Adhesions noted inferior to rib cage bilaterally        ASSESSMENT  Phyllis continues to require intervention to meet STG and LTG's: PT  Patient's symptoms are resolving.  Response to therapy has shown an improvement in  ROM  and flexibility  Progress made towards STG/LTG?  Yes,     PLAN  Current treatment program is being advanced to more complex exercises.    PTA/ATC plan:  N/A    Please refer to the daily flowsheet for treatment today, total treatment time and time spent performing 1:1 timed codes.

## 2021-12-13 ENCOUNTER — THERAPY VISIT (OUTPATIENT)
Dept: PHYSICAL THERAPY | Facility: CLINIC | Age: 51
End: 2021-12-13
Payer: COMMERCIAL

## 2021-12-13 DIAGNOSIS — M79.18 MYOFASCIAL PAIN: ICD-10-CM

## 2021-12-13 PROCEDURE — 97140 MANUAL THERAPY 1/> REGIONS: CPT | Mod: GP | Performed by: PHYSICAL THERAPIST

## 2021-12-13 PROCEDURE — 97110 THERAPEUTIC EXERCISES: CPT | Mod: GP | Performed by: PHYSICAL THERAPIST

## 2022-01-09 ENCOUNTER — HEALTH MAINTENANCE LETTER (OUTPATIENT)
Age: 52
End: 2022-01-09

## 2022-01-28 ENCOUNTER — MYC MEDICAL ADVICE (OUTPATIENT)
Dept: ANESTHESIOLOGY | Facility: CLINIC | Age: 52
End: 2022-01-28
Payer: COMMERCIAL

## 2022-01-28 DIAGNOSIS — Z96.89 S/P INSERTION OF SPINAL CORD STIMULATOR: Primary | ICD-10-CM

## 2022-01-28 DIAGNOSIS — M62.830 BACK MUSCLE SPASM: ICD-10-CM

## 2022-01-31 RX ORDER — CYCLOBENZAPRINE HCL 10 MG
TABLET ORAL
Qty: 30 TABLET | Refills: 3 | Status: SHIPPED | OUTPATIENT
Start: 2022-01-31 | End: 2022-06-01

## 2022-01-31 NOTE — TELEPHONE ENCOUNTER
Medication refill sent to patient's pharmacy. No changes. Patient last seen 10/4/21.       Miri Dozier RN

## 2022-01-31 NOTE — TELEPHONE ENCOUNTER
RNCC returned call to pt. She was assessed for red flag symptoms that would necessity emergent evaluation - no red flag symptoms noted. She does endorse change in effectiveness of SCS, possible concerns for lead migration. Pt was scheduled for clinic appointment with Dr. Cam 2/3/22 at 1000, orders for thoracic and lumbar xrays placed per protocol. Pt advised to complete xrays prior to appt at any [facility] location. She verbalized understanding and is in agreement with the plan.     Dr. Cam updated. [employer] notified.

## 2022-02-02 ENCOUNTER — ANCILLARY PROCEDURE (OUTPATIENT)
Dept: GENERAL RADIOLOGY | Facility: CLINIC | Age: 52
End: 2022-02-02
Attending: ANESTHESIOLOGY
Payer: COMMERCIAL

## 2022-02-02 DIAGNOSIS — Z96.89 S/P INSERTION OF SPINAL CORD STIMULATOR: ICD-10-CM

## 2022-02-02 PROCEDURE — 72100 X-RAY EXAM L-S SPINE 2/3 VWS: CPT | Performed by: RADIOLOGY

## 2022-02-02 PROCEDURE — 72072 X-RAY EXAM THORAC SPINE 3VWS: CPT | Performed by: RADIOLOGY

## 2022-02-03 ENCOUNTER — OFFICE VISIT (OUTPATIENT)
Dept: ANESTHESIOLOGY | Facility: CLINIC | Age: 52
End: 2022-02-03
Payer: COMMERCIAL

## 2022-02-03 VITALS — DIASTOLIC BLOOD PRESSURE: 85 MMHG | SYSTOLIC BLOOD PRESSURE: 126 MMHG | HEART RATE: 91 BPM | OXYGEN SATURATION: 100 %

## 2022-02-03 DIAGNOSIS — M96.1 POSTLAMINECTOMY SYNDROME OF LUMBAR REGION: Primary | ICD-10-CM

## 2022-02-03 DIAGNOSIS — M54.17 LUMBOSACRAL RADICULOPATHY AT S1: ICD-10-CM

## 2022-02-03 PROCEDURE — 99214 OFFICE O/P EST MOD 30 MIN: CPT | Mod: GC | Performed by: ANESTHESIOLOGY

## 2022-02-03 ASSESSMENT — PAIN SCALES - GENERAL: PAINLEVEL: MODERATE PAIN (4)

## 2022-02-03 NOTE — NURSING NOTE
RN reviewed AVS with patient. Patient to contact clinic if any questions/concerns. Patient verbalized understanding.    Miri Dozier RN

## 2022-02-03 NOTE — PATIENT INSTRUCTIONS
Imaging:    MR Lumbar Spine w/o Contrast.     IMAGING SERVICES HOURS:    All imaging modalities are available from 7 a.m. - 9 p.m. Monday through Friday  X-ray, CT, MRI, and General Ultrasound appointments are available from 7 a.m. -3:30 p.m. on Saturdays  X-ray, CT and MRI appointments are available from 8 a.m. - 4:30 p.m. on Sundays  Please call [phone] to schedule imaging exams        Recommended Follow up:      Call clinic or [employer] message after MRI completed. MD will then contact you with results.          Please call [phone], option #1 to schedule your clinic appointment if you don't already have an appointment scheduled.        To speak with a nurse, schedule/reschedule/cancel a clinic appointment, or request a medication refill call: [phone], option #1.    You can also reach us by [employer]: [email or web address]

## 2022-02-03 NOTE — NURSING NOTE
Patient presents with:  RECHECK: [facility] RETURN, RM 14, patient reports , lower back, Groin, R leg, 4/10      Moderate Pain (4)     Pain Medications     Opioid Agonists Refills Start End     traMADol (ULTRAM) 50 MG tablet    0 10/4/2021     Sig - Route: Take 1 tablet (50 mg) by mouth every 6 hours as needed for severe pain - Oral    Class: E-Prescribe    Prior authorization: Approved          What medications are you using for pain?   Tramadol, flexeril         (Return Patients only) What refills are you needing today? N/A      James Jules, EMT

## 2022-02-03 NOTE — PROGRESS NOTES
[facility]    Date of visit: 2/3/2022    Chief complaint:   Chief Complaint   Patient presents with     RECHECK     [facility] RETURN, RM 14, patient reports , lower back, Groin, R leg, 4/10       Interval history:  51-year-old  female presented clinic today with acute back pain.  Patient is status post laminectomy and spinal cord stimulator implant.  Patient was doing fine until last Monday where she was lifting groceries and hurt her back.  She states she immediately felt pain in her back and numbness down the back of her right leg into her foot.  Pain has gotten slightly better and numbness has mostly resolved.  Patient denies red flags including urinary fecal incontinence saddle anesthesia.  She states that her right leg feels weak but can contribute to weakness in the leg versus pain in her back.  Pain is mostly in the right lower back and is a dull ache but tight and stabbing at times.  Pain is 5/10.  Her x-ray of the spine showed no migration of the leads.  She states that she had lumbar disc herniation at L4-L5 6 years ago which required decompression and laminectomy.    THORACIC SPINE THREE VIEWS;   LUMBAR SPINE TWO TO THREE VIEWS  2/2/2022 1:50 PM     INDICATION: Possible spinal cord stimulator lead migration. Status  post insertion of spinal cord stimulator.     COMPARISON: Radiographs 10/31/2019                                                                      IMPRESSION:   Thoracic spine: Hypoplastic or absent ribs at T12. Spinal cord  stimulator leads are present projecting over the dorsal spinal canal.  The more cephalad lead terminates at the lower T6 level. The more  inferior lead termination is at the T7-T8 interspace level. The  inferior lead appears similar to the 2019 exam. The more superior lead  termination appears slightly higher than the prior study where it  terminated at the T6-T7 interspace level.     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 Five lumbar vertebral  bodies  below this. Normal vertebral body heights. Low-grade retrolisthesis of  L2-L4. Mild to moderate interspace and endplate degeneration L4-L5 and  L5-S1. Mild to moderate lower lumbar spine facet arthropathy. Left  dorsal placement of a spinal cord stimulator device. Leads appear to  enter the spinal canal at the T12 level and appear intact.     JAYA GONZALEZ MD           Pain scores:  Pain intensity on average is 5 on a scale of 0-10.     Current pain treatments:   Tramadol, flexiril     Side Effects: no side effect    Medications:  Current Outpatient Medications   Medication Sig Dispense Refill     albuterol (PROAIR HFA/PROVENTIL HFA/VENTOLIN HFA) 108 (90 BASE) MCG/ACT Inhaler Inhale 2 puffs into the lungs as needed       amphetamine-dextroamphetamine (ADDERALL XR) 30 MG per 24 hr capsule Take 20 mg by mouth every morning Takes two tablets in the morning equalling 40 mg a day.       Calcium-Magnesium-Zinc 333-133-5 MG TABS per tablet Take 1 tablet by mouth every morning        cetirizine (ZYRTEC) 10 MG tablet Take 10 mg by mouth every morning        chlorhexidine (HIBICLENS) 4 % liquid Wash night before and morning of surgery, per clinic instructions 118 mL 0     cholecalciferol (VITAMIN D3) 1000 UNIT tablet Take 1,000 Units by mouth every morning        CHROMIUM PO Take 1 tablet by mouth as needed        cyclobenzaprine (FLEXERIL) 10 MG tablet TAKE 1 TABLET(10 MG) BY MOUTH EVERY NIGHT AS NEEDED FOR MUSCLE SPASMS 30 tablet 3     dextromethorphan-guaiFENesin (MUCINEX DM)  MG 12 hr tablet Take 1 tablet by mouth every morning       fluticasone (FLONASE) 50 MCG/ACT nasal spray 2 times daily        lidocaine (XYLOCAINE) 5 % ointment Apply 1 g topically 4 times daily as needed for moderate pain 50 g 1     metFORMIN (GLUCOPHAGE) 500 MG tablet Take 500 mg by mouth       Multiple Vitamins-Minerals (QC WOMENS DAILY MULTIVITAMIN) TABS Take 1 tablet by mouth every morning        traMADol (ULTRAM) 50 MG tablet  Take 1 tablet (50 mg) by mouth every 6 hours as needed for severe pain 30 tablet 0       Medical History: any changes in medical history since they were last seen? No    Review of Systems:  The 14 system ROS was reviewed from the intake questionnaire  Any bowel or bladder problems: deneis  Mood: good    Physical Exam:  Blood pressure 126/85, pulse 91, SpO2 100 %, not currently breastfeeding.  General: well developed   Gait: Normal  MSK exam: Right hip extension and right knee extension 4/5. 5/5 in all other limbs. Reflexes 2+ in LE, sensation intake in LE. Point tenderness in right paraspinal muscles.  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  No lead migration noted on x-ray.  Impedence testing done by [employer] rep was good. Hardware seems to be in good shape. Concern for acute radiculopathy given new numbness and pain down back of right leg.  MRI in 2015 showed moderate to severe disc height loss, ligamentum flavum thickening and facet hypertrophy.  She also had right hemilaminectomy at this level.  There is mild disc bulges at L4-L5 level which is minimally contacted with traversing L5 nerve roots.  In addition there is a severe disc height loss at the level of L5-S1.  There is moderate neuroforaminal stenosis bilaterally at the level of L5-S1.    Diagnosis  Status post SCS implant  New onset S1 radiculopathy after trauma  History of laminectomy and degenerative disc disease at L4 and L5    Plan:    1. Diagnostic Studies:  Will get MRI lumbar spine to rule out etiology of new radicular symptoms to the right lower extremity.  Patient has a SCS device which is MRI compatible.  [employer]ist[employer]ija vozila advised the patient how to turn off the stimulator prior to the  MRI.  2. Medication Management: She takes a small dose of tramadol occasionally.  She does not want tramadol refill:.  3. Further procedures recommended: We will review MRI.  Future pain procedure will be based on MRI result.  4. Reprogramming of the spinal cord stimulator performed today by [employer] rep Arnold Basilio  5. Follow up: 1 month.     Ephraim Fried MD, ESTRELLA  Pain fellow    Physician Attestation   I saw and evaluated Phyllis Vela.  I agree with above history, review of systems, physical exam and plan. I have reviewed the content of the documentation and have edited it as needed. I have personally performed the services documented here and the documentation accurately represents those services and the decisions I have made.     Nando Cam MD, PhD    [facility] [address]  [address] [facility] [id number] [phone]  Dept: [phone]

## 2022-02-03 NOTE — LETTER
2/3/2022       RE: Phyllis Vela  [address]  [place] [address]     Dear Colleague,    Thank you for referring your patient, Phyllis Vela, to the [facility] [place] at [facility]. Please see a copy of my visit note below.    [facility]    Date of visit: 2/3/2022    Chief complaint:   Chief Complaint   Patient presents with     RECHECK     [facility] RETURN, RM 14, patient reports , lower back, Groin, R leg, 4/10       Interval history:  51-year-old  female presented clinic today with acute back pain.  Patient is status post laminectomy and spinal cord stimulator implant.  Patient was doing fine until last Monday where she was lifting groceries and hurt her back.  She states she immediately felt pain in her back and numbness down the back of her right leg into her foot.  Pain has gotten slightly better and numbness has mostly resolved.  Patient denies red flags including urinary fecal incontinence saddle anesthesia.  She states that her right leg feels weak but can contribute to weakness in the leg versus pain in her back.  Pain is mostly in the right lower back and is a dull ache but tight and stabbing at times.  Pain is 5/10.  Her x-ray of the spine showed no migration of the leads.  She states that she had lumbar disc herniation at L4-L5 6 years ago which required decompression and laminectomy.    THORACIC SPINE THREE VIEWS;   LUMBAR SPINE TWO TO THREE VIEWS  2/2/2022 1:50 PM     INDICATION: Possible spinal cord stimulator lead migration. Status  post insertion of spinal cord stimulator.     COMPARISON: Radiographs 10/31/2019                                                                      IMPRESSION:   Thoracic spine: Hypoplastic or absent ribs at T12. Spinal cord  stimulator leads are present projecting over the dorsal spinal canal.  The more cephalad lead terminates at the  lower T6 level. The more  inferior lead termination is at the T7-T8 interspace level. The  inferior lead appears similar to the 2019 exam. The more superior lead  termination appears slightly higher than the prior study where it  terminated at the T6-T7 interspace level.     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 Five lumbar vertebral bodies  below this. Normal vertebral body heights. Low-grade retrolisthesis of  L2-L4. Mild to moderate interspace and endplate degeneration L4-L5 and  L5-S1. Mild to moderate lower lumbar spine facet arthropathy. Left  dorsal placement of a spinal cord stimulator device. Leads appear to  enter the spinal canal at the T12 level and appear intact.     JAYA GONZALEZ MD           Pain scores:  Pain intensity on average is 5 on a scale of 0-10.     Current pain treatments:   Tramadol, flexiril     Side Effects: no side effect    Medications:  Current Outpatient Medications   Medication Sig Dispense Refill     albuterol (PROAIR HFA/PROVENTIL HFA/VENTOLIN HFA) 108 (90 BASE) MCG/ACT Inhaler Inhale 2 puffs into the lungs as needed       amphetamine-dextroamphetamine (ADDERALL XR) 30 MG per 24 hr capsule Take 20 mg by mouth every morning Takes two tablets in the morning equalling 40 mg a day.       Calcium-Magnesium-Zinc 333-133-5 MG TABS per tablet Take 1 tablet by mouth every morning        cetirizine (ZYRTEC) 10 MG tablet Take 10 mg by mouth every morning        chlorhexidine (HIBICLENS) 4 % liquid Wash night before and morning of surgery, per clinic instructions 118 mL 0     cholecalciferol (VITAMIN D3) 1000 UNIT tablet Take 1,000 Units by mouth every morning        CHROMIUM PO Take 1 tablet by mouth as needed        cyclobenzaprine (FLEXERIL) 10 MG tablet TAKE 1 TABLET(10 MG) BY MOUTH EVERY NIGHT AS NEEDED FOR MUSCLE SPASMS 30 tablet 3     dextromethorphan-guaiFENesin (MUCINEX DM)  MG 12 hr tablet Take 1 tablet by mouth every morning       fluticasone (FLONASE) 50 MCG/ACT nasal  spray 2 times daily        lidocaine (XYLOCAINE) 5 % ointment Apply 1 g topically 4 times daily as needed for moderate pain 50 g 1     metFORMIN (GLUCOPHAGE) 500 MG tablet Take 500 mg by mouth       Multiple Vitamins-Minerals (QC WOMENS DAILY MULTIVITAMIN) TABS Take 1 tablet by mouth every morning        traMADol (ULTRAM) 50 MG tablet Take 1 tablet (50 mg) by mouth every 6 hours as needed for severe pain 30 tablet 0       Medical History: any changes in medical history since they were last seen? No    Review of Systems:  The 14 system ROS was reviewed from the intake questionnaire  Any bowel or bladder problems: deneis  Mood: good    Physical Exam:  Blood pressure 126/85, pulse 91, SpO2 100 %, not currently breastfeeding.  General: well developed   Gait: Normal  MSK exam: Right hip extension and right knee extension 4/5. 5/5 in all other limbs. Reflexes 2+ in LE, sensation intake in LE. Point tenderness in right paraspinal muscles.  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  No lead migration noted on x-ray.  Impedence testing done by [employer] rep was good. Hardware seems to be in good shape. Concern for acute radiculopathy given new numbness and pain down back of right leg.  MRI in 2015 showed moderate to severe disc height loss, ligamentum flavum thickening and facet hypertrophy.  She also had right hemilaminectomy at this level.  There is mild disc bulges at L4-L5 level which is minimally contacted with traversing L5 nerve roots.  In addition there is a severe disc height loss at the level of L5-S1.  There is moderate neuroforaminal stenosis bilaterally at the level of L5-S1.    Diagnosis  Status post SCS implant  New onset S1 radiculopathy after  trauma  History of laminectomy and degenerative disc disease at L4 and L5    Plan:    1. Diagnostic Studies:  Will get MRI lumbar spine to rule out etiology of new radicular symptoms to the right lower extremity.  Patient has a SCS device which is MRI compatible.  [employer] rep advised the patient how to turn off the stimulator prior to the MRI.  2. Medication Management: She takes a small dose of tramadol occasionally.  She does not want tramadol refill:.  3. Further procedures recommended: We will review MRI.  Future pain procedure will be based on MRI result.  4. Reprogramming of the spinal cord stimulator performed today by [employer] rep Arnold Basilio  5. Follow up: 1 month.     Ephraim Fried MD, ESTRELLA  Pain fellow    Physician Attestation   I saw and evaluated Phyllis Vela.  I agree with above history, review of systems, physical exam and plan. I have reviewed the content of the documentation and have edited it as needed. I have personally performed the services documented here and the documentation accurately represents those services and the decisions I have made.     Nando Cam MD, PhD    [facility] [address]  5TH FLOOR  [facility] [id number] [phone]  Dept: [phone]

## 2022-02-09 PROBLEM — M79.18 MYOFASCIAL PAIN: Status: RESOLVED | Noted: 2021-10-25 | Resolved: 2022-02-09

## 2022-05-01 ENCOUNTER — HEALTH MAINTENANCE LETTER (OUTPATIENT)
Age: 52
End: 2022-05-01

## 2022-06-01 ENCOUNTER — TELEPHONE (OUTPATIENT)
Dept: ANESTHESIOLOGY | Facility: CLINIC | Age: 52
End: 2022-06-01
Payer: COMMERCIAL

## 2022-06-01 DIAGNOSIS — M54.16 LUMBAR RADICULOPATHY: ICD-10-CM

## 2022-06-01 DIAGNOSIS — M62.830 BACK MUSCLE SPASM: ICD-10-CM

## 2022-06-01 RX ORDER — CYCLOBENZAPRINE HCL 10 MG
TABLET ORAL
Qty: 30 TABLET | Refills: 3 | Status: SHIPPED | OUTPATIENT
Start: 2022-06-01 | End: 2024-05-23

## 2022-06-01 RX ORDER — TRAMADOL HYDROCHLORIDE 50 MG/1
50 TABLET ORAL EVERY 6 HOURS PRN
Qty: 30 TABLET | Refills: 0 | Status: SHIPPED | OUTPATIENT
Start: 2022-06-01 | End: 2024-05-23

## 2022-06-01 NOTE — TELEPHONE ENCOUNTER
Patient needs follow up appointment with Dr. Cam, for medication review.   Please assist pt in scheduling.     Buffy Benedict LPN

## 2022-06-01 NOTE — TELEPHONE ENCOUNTER
Refill request    Medication: Tramadol      Last clinic appointment: 2/3/22  Next clinic appointment: Not scheduled. Will reach out to pt to schedule.     Last Drug Screen Collected: 10/4/21  Controlled Substance Agreement signed: 10/4/21      Preferred pharmacy:     [id number] - [place], [place] - [address] AT [facility] [id number]

## 2022-10-11 ENCOUNTER — E-VISIT (OUTPATIENT)
Dept: URGENT CARE | Facility: URGENT CARE | Age: 52
End: 2022-10-11

## 2022-10-11 DIAGNOSIS — B96.89 ACUTE BACTERIAL SINUSITIS: Primary | ICD-10-CM

## 2022-10-11 DIAGNOSIS — J01.90 ACUTE BACTERIAL SINUSITIS: Primary | ICD-10-CM

## 2022-10-11 PROCEDURE — 99421 OL DIG E/M SVC 5-10 MIN: CPT | Performed by: PHYSICIAN ASSISTANT

## 2022-10-11 RX ORDER — DOXYCYCLINE HYCLATE 100 MG
100 TABLET ORAL 2 TIMES DAILY
Qty: 14 TABLET | Refills: 0 | Status: SHIPPED | OUTPATIENT
Start: 2022-10-11 | End: 2022-10-18

## 2022-10-11 NOTE — PATIENT INSTRUCTIONS
When to Use Antibiotics    Antibiotics are medicines used to treat infections caused by bacteria. They don t work for an illness caused by a virus. And they don't work for an allergic reaction. In fact, taking antibiotics for reasons other than an infection by bacteria can cause problems. You may have side effects from the medicine. And if you need an antibiotic in the future, it may not work well. This is because the bacteria can become immune to the medicine. You can also get a type of diarrhea that's hard to treat. This diarrhea is called C. diff.   When antibiotics likely won t help  Your healthcare provider won t usually give you antibiotics for the conditions listed below. You can help by not asking for them if you have:     A cold. This type of illness is caused by a virus. It can cause a runny nose, stuffed-up nose, sneezing, coughing, and headache. You may also have mild body aches and low fever. A cold gets better on its own in a few days to a week.    The flu (influenza). This is a respiratory illness caused by a virus. The flu usually goes away on its own in a week or so. It can cause fever, body aches, sore throat, and tiredness.    Bronchitis. This is an infection in the lungs. It is most often caused by a virus. You may have coughing, phlegm, body aches, and a low fever. A common type of bronchitis is known as a chest cold. This is called acute bronchitis. This often happens after you have a respiratory infection like a cold. Bronchitis can take weeks to go away. Antibiotics often don t help.    Most sore throats. Sore throats are most often caused by viruses. Your throat may feel scratchy or achy. It may hurt to swallow. You may also have a low fever and body aches. A sore throat usually gets better in a few days.    Most outer ear infections. An ear infection may be caused by a virus or bacteria. It causes pain in the ear. Antibiotics by mouth usually don t help. Low-dose antibiotic ear drops  work much better.    Some inner ear infections. An inner ear infection (otitis media) can be caused by a virus in the ear. It can also cause pain and a high fever. Most older children with low-grade fever don't need to be treated with antibiotics.    Most sinus infections. This is also known as sinusitis. This kind of infection causes sinus pain and swelling, and a runny nose. In most cases, it goes away on its own. Antibiotics don t make recovery quicker.    Allergic rhinitis. This is a set of symptoms caused by an allergic reaction. You may have sneezing, a runny nose, itchy or watery eyes, or a sore throat. Allergies are not treated with antibiotics.    Low fever. A mild fever that s less than 100.4 F (38 C) most likely doesn t need to be treated with antibiotics.   When antibiotics can help  Antibiotics can be used to treat:                                                       Strep throat. This is a throat infection caused by a certain type of bacteria. Symptoms of strep throat include a sore throat, white patches on the tonsils, red spots on the roof of the mouth, fever, body aches, and nausea and vomiting. Strep throat almost never causes a cough.    Urinary tract infection (UTI). This is an infection of the bladder and the tube that takes urine out of the body. It is caused by bacteria. It can cause burning pain and urine that s cloudy or tinted with blood. UTIs are very common. Antibiotics usually help treat them.    Some outer ear infections. In some cases, a healthcare provider may prescribe antibiotics by mouth for an ear infection. You may need a test to show the cause of the ear infection.    Some sinus infections. In some cases, your healthcare provider may give you antibiotics. He or she may first need to make sure your symptoms aren t caused by something else. This may be a virus, fungus, allergies, or air pollutants such as smoke.   Your healthcare provider may give you antibiotics if you have a  condition that can affect your immune system. This includes diabetes or cancer.  Self-care at home  If your infection can t be treated with antibiotics, you can take other steps to feel better. Try the remedies below. In general:     Rest and sleep as much as needed.    Drink water and other clear fluids.    Don t smoke. Stay away from smoke from other people.    Use over-the-counter medicine such as acetaminophen or ibuprofen to ease pain or fever, as directed by your healthcare provider.  To treat sinus pain or nasal stuffiness:    Put a warm, moist cloth on your face where you feel sinus pain or pressure.    Try a nasal spray with medicine or saline. Use as directed by your healthcare provider.    Breathe in steam from a hot shower.    Use a humidifier or cool mist vaporizer.   To quiet a cough:     Use a humidifier or cool mist vaporizer.    Breathe in steam from a hot shower.    Suck on cough lozenges.   To sooth a sore throat:     Suck on ice chips, frozen ice pops, or lozenges.    Use a sore throat spray.    Use a humidifier or cool mist vaporizer.    Gargle with saltwater.    Drink warm liquids.    Take ibuprofen to reduce swelling and pain.  To ease ear pain:     Hold a warm, moist washcloth on the ear for 10 minutes at a time.  [employer] last reviewed this educational content on 12/1/2019 2000-2021 [employer]. All rights reserved. This information is not intended as a substitute for professional medical care. Always follow your healthcare professional's instructions.        Dear Phyllis Vela    After reviewing your responses, I've been able to diagnose you with?a sinus infection caused by bacteria.?     Based on your responses and diagnosis, I have prescribed doxycycline to treat your symptoms. I have sent this to your pharmacy.?     It is also important to stay well hydrated, get lots of rest and take over-the-counter decongestants,?tylenol?or ibuprofen if you?are able to?take those  medications per your primary care provider to help relieve discomfort.?     It is important that you take?all of?your prescribed medication even if your symptoms are improving after a few doses.? Taking?all of?your medicine helps prevent the symptoms from returning.?     If your symptoms worsen, you develop severe headache, vomiting, high fever (>102), or are not improving in 7 days, please contact your primary care provider for an appointment or visit any of our convenient Walk-in Care or Urgent Care Centers to be seen which can be found on our website?here.?     Thanks again for choosing?us?as your health care partner,?   ?  Olvin Prince PA-C?

## 2022-11-21 ENCOUNTER — HEALTH MAINTENANCE LETTER (OUTPATIENT)
Age: 52
End: 2022-11-21

## 2023-06-02 ENCOUNTER — HEALTH MAINTENANCE LETTER (OUTPATIENT)
Age: 53
End: 2023-06-02

## 2023-10-23 NOTE — ANESTHESIA POSTPROCEDURE EVALUATION
Patient: Phyllis Vela    Procedure(s):  Insertion Bilateral Dorsal Column Stimulator (Trial)     - Wound Class: I-Clean    Diagnosis:Pain   Diagnosis Additional Information: No value filed.    Anesthesia Type:  General    Note:  Anesthesia Post Evaluation    Patient location during evaluation: PACU  Patient participation: Able to fully participate in evaluation  Level of consciousness: awake and alert  Pain management: adequate  Airway patency: patent  Cardiovascular status: hemodynamically stable  Respiratory status: acceptable  Hydration status: stable  PONV: none     Anesthetic complications: None          Last vitals:  Vitals:    05/11/18 1550 05/11/18 1600 05/11/18 1610   BP: 123/86 127/80 124/84   Pulse:      Resp: 16 16 16   Temp: 36.6  C (97.9  F)     SpO2: 98% 100% 90%         Electronically Signed By: Chalino Escobar MD  May 11, 2018  4:32 PM   Otezla Counseling: The side effects of Otezla were discussed with the patient, including but not limited to worsening or new depression, weight loss, diarrhea, nausea, upper respiratory tract infection, and headache. Patient instructed to call the office should any adverse effect occur.  The patient verbalized understanding of the proper use and possible adverse effects of Otezla.  All the patient's questions and concerns were addressed. High Dose Vitamin A Pregnancy And Lactation Text: High dose vitamin A therapy is contraindicated during pregnancy and breast feeding. Qbrexza Pregnancy And Lactation Text: There is no available data on Qbrexza use in pregnant women.  There is no available data on Qbrexza use in lactation. Libtayo Pregnancy And Lactation Text: This medication is contraindicated in pregnancy and when breast feeding. Acitretin Counseling:  I discussed with the patient the risks of acitretin including but not limited to hair loss, dry lips/skin/eyes, liver damage, hyperlipidemia, depression/suicidal ideation, photosensitivity.  Serious rare side effects can include but are not limited to pancreatitis, pseudotumor cerebri, bony changes, clot formation/stroke/heart attack.  Patient understands that alcohol is contraindicated since it can result in liver toxicity and significantly prolong the elimination of the drug by many years. Taltz Counseling: I discussed with the patient the risks of ixekizumab including but not limited to immunosuppression, serious infections, worsening of inflammatory bowel disease and drug reactions.  The patient understands that monitoring is required including a PPD at baseline and must alert us or the primary physician if symptoms of infection or other concerning signs are noted. Colchicine Pregnancy And Lactation Text: This medication is Pregnancy Category C and isn't considered safe during pregnancy. It is excreted in breast milk. Cephalexin Pregnancy And Lactation Text: This medication is Pregnancy Category B and considered safe during pregnancy.  It is also excreted in breast milk but can be used safely for shorter doses. Carac Pregnancy And Lactation Text: This medication is Pregnancy Category X and contraindicated in pregnancy and in women who may become pregnant. It is unknown if this medication is excreted in breast milk. Detail Level: Simple Rituxan Pregnancy And Lactation Text: This medication is Pregnancy Category C and it isn't know if it is safe during pregnancy. It is unknown if this medication is excreted in breast milk but similar antibodies are known to be excreted. Zyclara Pregnancy And Lactation Text: This medication is Pregnancy Category C. It is unknown if this medication is excreted in breast milk. Thalidomide Pregnancy And Lactation Text: This medication is Pregnancy Category X and is absolutely contraindicated during pregnancy. It is unknown if it is excreted in breast milk. Litfulo Pregnancy And Lactation Text: Based on animal studies, Lifulo may cause embryo-fetal harm when administered to pregnant women.  The medication should not be used in pregnancy.  Breastfeeding is not recommended during treatment. Minocycline Counseling: Patient advised regarding possible photosensitivity and discoloration of the teeth, skin, lips, tongue and gums.  Patient instructed to avoid sunlight, if possible.  When exposed to sunlight, patients should wear protective clothing, sunglasses, and sunscreen.  The patient was instructed to call the office immediately if the following severe adverse effects occur:  hearing changes, easy bruising/bleeding, severe headache, or vision changes.  The patient verbalized understanding of the proper use and possible adverse effects of minocycline.  All of the patient's questions and concerns were addressed. Eucrisa Counseling: Patient may experience a mild burning sensation during topical application. Eucrisa is not approved in children less than 2 years of age. Tazorac Counseling:  Patient advised that medication is irritating and drying.  Patient may need to apply sparingly and wash off after an hour before eventually leaving it on overnight.  The patient verbalized understanding of the proper use and possible adverse effects of tazorac.  All of the patient's questions and concerns were addressed. Olumiant Counseling: I discussed with the patient the risks of Olumiant therapy including but not limited to upper respiratory tract infections, shingles, cold sores, and nausea. Live vaccines should be avoided.  This medication has been linked to serious infections; higher rate of mortality; malignancy and lymphoproliferative disorders; major adverse cardiovascular events; thrombosis; gastrointestinal perforations; neutropenia; lymphopenia; anemia; liver enzyme elevations; and lipid elevations. Minocycline Pregnancy And Lactation Text: This medication is Pregnancy Category D and not consider safe during pregnancy. It is also excreted in breast milk. Eucrisa Pregnancy And Lactation Text: This medication has not been assigned a Pregnancy Risk Category but animal studies failed to show danger with the topical medication. It is unknown if the medication is excreted in breast milk. Dapsone Counseling: I discussed with the patient the risks of dapsone including but not limited to hemolytic anemia, agranulocytosis, rashes, methemoglobinemia, kidney failure, peripheral neuropathy, headaches, GI upset, and liver toxicity.  Patients who start dapsone require monitoring including baseline LFTs and weekly CBCs for the first month, then every month thereafter.  The patient verbalized understanding of the proper use and possible adverse effects of dapsone.  All of the patient's questions and concerns were addressed. Tazorac Pregnancy And Lactation Text: This medication is not safe during pregnancy. It is unknown if this medication is excreted in breast milk. Dupixent Counseling: I discussed with the patient the risks of dupilumab including but not limited to eye infection and irritation, cold sores, injection site reactions, worsening of asthma, allergic reactions and increased risk of parasitic infection.  Live vaccines should be avoided while taking dupilumab. Dupilumab will also interact with certain medications such as warfarin and cyclosporine. The patient understands that monitoring is required and they must alert us or the primary physician if symptoms of infection or other concerning signs are noted. Terbinafine Pregnancy And Lactation Text: This medication is Pregnancy Category B and is considered safe during pregnancy. It is also excreted in breast milk and breast feeding isn't recommended. Tranexamic Acid Counseling:  Patient advised of the small risk of bleeding problems with tranexamic acid. They were also instructed to call if they developed any nausea, vomiting or diarrhea. All of the patient's questions and concerns were addressed. Taltz Pregnancy And Lactation Text: The risk during pregnancy and breastfeeding is uncertain with this medication. Cyclosporine Pregnancy And Lactation Text: This medication is Pregnancy Category C and it isn't know if it is safe during pregnancy. This medication is excreted in breast milk. Topical Sulfur Applications Counseling: Topical Sulfur Counseling: Patient counseled that this medication may cause skin irritation or allergic reactions.  In the event of skin irritation, the patient was advised to reduce the amount of the drug applied or use it less frequently.   The patient verbalized understanding of the proper use and possible adverse effects of topical sulfur application.  All of the patient's questions and concerns were addressed. Albendazole Pregnancy And Lactation Text: This medication is Pregnancy Category C and it isn't known if it is safe during pregnancy. It is also excreted in breast milk. Mirvaso Counseling: Mirvaso is a topical medication which can decrease superficial blood flow where applied. Side effects are uncommon and include stinging, redness and allergic reactions. Low Dose Naltrexone Pregnancy And Lactation Text: Naltrexone is pregnancy category C.  There have been no adequate and well-controlled studies in pregnant women.  It should be used in pregnancy only if the potential benefit justifies the potential risk to the fetus.   Limited data indicates that naltrexone is minimally excreted into breastmilk. Spironolactone Counseling: Patient advised regarding risks of diarrhea, abdominal pain, hyperkalemia, birth defects (for female patients), liver toxicity and renal toxicity. The patient may need blood work to monitor liver and kidney function and potassium levels while on therapy. The patient verbalized understanding of the proper use and possible adverse effects of spironolactone.  All of the patient's questions and concerns were addressed. Spironolactone Pregnancy And Lactation Text: This medication can cause feminization of the male fetus and should be avoided during pregnancy. The active metabolite is also found in breast milk. Mirvaso Pregnancy And Lactation Text: This medication has not been assigned a Pregnancy Risk Category. It is unknown if the medication is excreted in breast milk. Ivermectin Counseling:  Patient instructed to take medication on an empty stomach with a full glass of water.  Patient informed of potential adverse effects including but not limited to nausea, diarrhea, dizziness, itching, and swelling of the extremities or lymph nodes.  The patient verbalized understanding of the proper use and possible adverse effects of ivermectin.  All of the patient's questions and concerns were addressed. Calcipotriene Pregnancy And Lactation Text: The use of this medication during pregnancy or lactation is not recommended as there is insufficient data. Opioid Counseling: I discussed with the patient the potential side effects of opioids including but not limited to addiction, altered mental status, and depression. I stressed avoiding alcohol, benzodiazepines, muscle relaxants and sleep aids unless specifically okayed by a physician. The patient verbalized understanding of the proper use and possible adverse effects of opioids. All of the patient's questions and concerns were addressed. They were instructed to flush the remaining pills down the toilet if they did not need them for pain. Niacinamide Counseling: I recommended taking niacin or niacinamide, also know as vitamin B3, twice daily. Recent evidence suggests that taking vitamin B3 (500 mg twice daily) can reduce the risk of actinic keratoses and non-melanoma skin cancers. Side effects of vitamin B3 include flushing and headache. Acitretin Pregnancy And Lactation Text: This medication is Pregnancy Category X and should not be given to women who are pregnant or may become pregnant in the future. This medication is excreted in breast milk. Fluconazole Counseling:  Patient counseled regarding adverse effects of fluconazole including but not limited to headache, diarrhea, nausea, upset stomach, liver function test abnormalities, taste disturbance, and stomach pain.  There is a rare possibility of liver failure that can occur when taking fluconazole.  The patient understands that monitoring of LFTs and kidney function test may be required, especially at baseline. The patient verbalized understanding of the proper use and possible adverse effects of fluconazole.  All of the patient's questions and concerns were addressed. Odomzo Counseling- I discussed with the patient the risks of Odomzo including but not limited to nausea, vomiting, diarrhea, constipation, weight loss, changes in the sense of taste, decreased appetite, muscle spasms, and hair loss.  The patient verbalized understanding of the proper use and possible adverse effects of Odomzo.  All of the patient's questions and concerns were addressed. Rhofade Counseling: Rhofade is a topical medication which can decrease superficial blood flow where applied. Side effects are uncommon and include stinging, redness and allergic reactions. Include Pregnancy/Lactation Warning?: No Otezla Pregnancy And Lactation Text: This medication is Pregnancy Category C and it isn't known if it is safe during pregnancy. It is unknown if it is excreted in breast milk. Siliq Counseling:  I discussed with the patient the risks of Siliq including but not limited to new or worsening depression, suicidal thoughts and behavior, immunosuppression, malignancy, posterior leukoencephalopathy syndrome, and serious infections.  The patient understands that monitoring is required including a PPD at baseline and must alert us or the primary physician if symptoms of infection or other concerning signs are noted. There is also a special program designed to monitor depression which is required with Siliq. Clindamycin Counseling: I counseled the patient regarding use of clindamycin as an antibiotic for prophylactic and/or therapeutic purposes. Clindamycin is active against numerous classes of bacteria, including skin bacteria. Side effects may include nausea, diarrhea, gastrointestinal upset, rash, hives, yeast infections, and in rare cases, colitis. Calcipotriene Counseling:  I discussed with the patient the risks of calcipotriene including but not limited to erythema, scaling, itching, and irritation. Tranexamic Acid Pregnancy And Lactation Text: It is unknown if this medication is safe during pregnancy or breast feeding. Clindamycin Pregnancy And Lactation Text: This medication can be used in pregnancy if certain situations. Clindamycin is also present in breast milk. Cantharidin Counseling:  I discussed with the patient the risks of Cantharidin including but not limited to pain, redness, burning, itching, and blistering. Fluconazole Pregnancy And Lactation Text: This medication is Pregnancy Category C and it isn't know if it is safe during pregnancy. It is also excreted in breast milk. Topical Clindamycin Counseling: Patient counseled that this medication may cause skin irritation or allergic reactions.  In the event of skin irritation, the patient was advised to reduce the amount of the drug applied or use it less frequently.   The patient verbalized understanding of the proper use and possible adverse effects of clindamycin.  All of the patient's questions and concerns were addressed. Dupixent Pregnancy And Lactation Text: This medication likely crosses the placenta but the risk for the fetus is uncertain. This medication is excreted in breast milk. Methotrexate Counseling:  Patient counseled regarding adverse effects of methotrexate including but not limited to nausea, vomiting, abnormalities in liver function tests. Patients may develop mouth sores, rash, diarrhea, and abnormalities in blood counts. The patient understands that monitoring is required including LFT's and blood counts.  There is a rare possibility of scarring of the liver and lung problems that can occur when taking methotrexate. Persistent nausea, loss of appetite, pale stools, dark urine, cough, and shortness of breath should be reported immediately. Patient advised to discontinue methotrexate treatment at least three months before attempting to become pregnant.  I discussed the need for folate supplements while taking methotrexate.  These supplements can decrease side effects during methotrexate treatment. The patient verbalized understanding of the proper use and possible adverse effects of methotrexate.  All of the patient's questions and concerns were addressed. Quinolones Counseling:  I discussed with the patient the risks of fluoroquinolones including but not limited to GI upset, allergic reaction, drug rash, diarrhea, dizziness, photosensitivity, yeast infections, liver function test abnormalities, tendonitis/tendon rupture. Hydroquinone Counseling:  Patient advised that medication may result in skin irritation, lightening (hypopigmentation), dryness, and burning.  In the event of skin irritation, the patient was advised to reduce the amount of the drug applied or use it less frequently.  Rarely, spots that are treated with hydroquinone can become darker (pseudoochronosis).  Should this occur, patient instructed to stop medication and call the office. The patient verbalized understanding of the proper use and possible adverse effects of hydroquinone.  All of the patient's questions and concerns were addressed. Dapsone Pregnancy And Lactation Text: This medication is Pregnancy Category C and is not considered safe during pregnancy or breast feeding. Olumiant Pregnancy And Lactation Text: Based on animal studies, Olumiant may cause embryo-fetal harm when administered to pregnant women.  The medication should not be used in pregnancy.  Breastfeeding is not recommended during treatment. Tremfya Counseling: I discussed with the patient the risks of guselkumab including but not limited to immunosuppression, serious infections, and drug reactions.  The patient understands that monitoring is required including a PPD at baseline and must alert us or the primary physician if symptoms of infection or other concerning signs are noted. Topical Sulfur Applications Pregnancy And Lactation Text: This medication is Pregnancy Category C and has an unknown safety profile during pregnancy. It is unknown if this topical medication is excreted in breast milk. Opzelura Counseling:  I discussed with the patient the risks of Opzelura including but not limited to nasopharngitis, bronchitis, ear infection, eosinophila, hives, diarrhea, folliculitis, tonsillitis, and rhinorrhea.  Taken orally, this medication has been linked to serious infections; higher rate of mortality; malignancy and lymphoproliferative disorders; major adverse cardiovascular events; thrombosis; thrombocytopenia, anemia, and neutropenia; and lipid elevations. Niacinamide Pregnancy And Lactation Text: These medications are considered safe during pregnancy. Wartpeel Counseling:  I discussed with the patient the risks of Wartpeel including but not limited to erythema, scaling, itching, weeping, crusting, and pain. Opioid Pregnancy And Lactation Text: These medications can lead to premature delivery and should be avoided during pregnancy. These medications are also present in breast milk in small amounts. Methotrexate Pregnancy And Lactation Text: This medication is Pregnancy Category X and is known to cause fetal harm. This medication is excreted in breast milk. Cantharidin Pregnancy And Lactation Text: This medication has not been proven safe during pregnancy. It is unknown if this medication is excreted in breast milk. Cimetidine Counseling:  I discussed with the patient the risks of Cimetidine including but not limited to gynecomastia, headache, diarrhea, nausea, drowsiness, arrhythmias, pancreatitis, skin rashes, psychosis, bone marrow suppression and kidney toxicity. Aklief counseling:  Patient advised to apply a pea-sized amount only at bedtime and wait 30 minutes after washing their face before applying.  If too drying, patient may add a non-comedogenic moisturizer.  The most commonly reported side effects including irritation, redness, scaling, dryness, stinging, burning, itching, and increased risk of sunburn.  The patient verbalized understanding of the proper use and possible adverse effects of retinoids.  All of the patient's questions and concerns were addressed. Winlevi Counseling:  I discussed with the patient the risks of topical clascoterone including but not limited to erythema, scaling, itching, and stinging. Patient voiced their understanding. Oxybutynin Counseling:  I discussed with the patient the risks of oxybutynin including but not limited to skin rash, drowsiness, dry mouth, difficulty urinating, and blurred vision. Solaraze Counseling:  I discussed with the patient the risks of Solaraze including but not limited to erythema, scaling, itching, weeping, crusting, and pain. Simponi Counseling:  I discussed with the patient the risks of golimumab including but not limited to myelosuppression, immunosuppression, autoimmune hepatitis, demyelinating diseases, lymphoma, and serious infections.  The patient understands that monitoring is required including a PPD at baseline and must alert us or the primary physician if symptoms of infection or other concerning signs are noted. Oxybutynin Pregnancy And Lactation Text: This medication is Pregnancy Category B and is considered safe during pregnancy. It is unknown if it is excreted in breast milk. Griseofulvin Counseling:  I discussed with the patient the risks of griseofulvin including but not limited to photosensitivity, cytopenia, liver damage, nausea/vomiting and severe allergy.  The patient understands that this medication is best absorbed when taken with a fatty meal (e.g., ice cream or french fries). Azathioprine Counseling:  I discussed with the patient the risks of azathioprine including but not limited to myelosuppression, immunosuppression, hepatotoxicity, lymphoma, and infections.  The patient understands that monitoring is required including baseline LFTs, Creatinine, possible TPMP genotyping and weekly CBCs for the first month and then every 2 weeks thereafter.  The patient verbalized understanding of the proper use and possible adverse effects of azathioprine.  All of the patient's questions and concerns were addressed. Doxycycline Counseling:  Patient counseled regarding possible photosensitivity and increased risk for sunburn.  Patient instructed to avoid sunlight, if possible.  When exposed to sunlight, patients should wear protective clothing, sunglasses, and sunscreen.  The patient was instructed to call the office immediately if the following severe adverse effects occur:  hearing changes, easy bruising/bleeding, severe headache, or vision changes.  The patient verbalized understanding of the proper use and possible adverse effects of doxycycline.  All of the patient's questions and concerns were addressed. Valtrex Counseling: I discussed with the patient the risks of valacyclovir including but not limited to kidney damage, nausea, vomiting and severe allergy.  The patient understands that if the infection seems to be worsening or is not improving, they are to call. 5-Fu Counseling: 5-Fluorouracil Counseling:  I discussed with the patient the risks of 5-fluorouracil including but not limited to erythema, scaling, itching, weeping, crusting, and pain. Enbrel Counseling:  I discussed with the patient the risks of etanercept including but not limited to myelosuppression, immunosuppression, autoimmune hepatitis, demyelinating diseases, lymphoma, and infections.  The patient understands that monitoring is required including a PPD at baseline and must alert us or the primary physician if symptoms of infection or other concerning signs are noted. Rinvoq Counseling: I discussed with the patient the risks of Rinvoq therapy including but not limited to upper respiratory tract infections, shingles, cold sores, bronchitis, nausea, cough, fever, acne, and headache. Live vaccines should be avoided.  This medication has been linked to serious infections; higher rate of mortality; malignancy and lymphoproliferative disorders; major adverse cardiovascular events; thrombosis; thrombocytopenia, anemia, and neutropenia; lipid elevations; liver enzyme elevations; and gastrointestinal perforations. Gabapentin Counseling: I discussed with the patient the risks of gabapentin including but not limited to dizziness, somnolence, fatigue and ataxia. Imiquimod Counseling:  I discussed with the patient the risks of imiquimod including but not limited to erythema, scaling, itching, weeping, crusting, and pain.  Patient understands that the inflammatory response to imiquimod is variable from person to person and was educated regarded proper titration schedule.  If flu-like symptoms develop, patient knows to discontinue the medication and contact us. Enbrel Pregnancy And Lactation Text: This medication is Pregnancy Category B and is considered safe during pregnancy. It is unknown if this medication is excreted in breast milk. Azathioprine Pregnancy And Lactation Text: This medication is Pregnancy Category D and isn't considered safe during pregnancy. It is unknown if this medication is excreted in breast milk. Prednisone Counseling:  I discussed with the patient the risks of prolonged use of prednisone including but not limited to weight gain, insomnia, osteoporosis, mood changes, diabetes, susceptibility to infection, glaucoma and high blood pressure.  In cases where prednisone use is prolonged, patients should be monitored with blood pressure checks, serum glucose levels and an eye exam.  Additionally, the patient may need to be placed on GI prophylaxis, PCP prophylaxis, and calcium and vitamin D supplementation and/or a bisphosphonate.  The patient verbalized understanding of the proper use and the possible adverse effects of prednisone.  All of the patient's questions and concerns were addressed. Opzelura Pregnancy And Lactation Text: There is insufficient data to evaluate drug-associated risk for major birth defects, miscarriage, or other adverse maternal or fetal outcomes.  There is a pregnancy registry that monitors pregnancy outcomes in pregnant persons exposed to the medication during pregnancy.  It is unknown if this medication is excreted in breast milk.  Do not breastfeed during treatment and for about 4 weeks after the last dose. Winlevi Pregnancy And Lactation Text: This medication is considered safe during pregnancy and breastfeeding. Dutasteride Male Counseling: Dustasteride Counseling:  I discussed with the patient the risks of use of dutasteride including but not limited to decreased libido, decreased ejaculate volume, and gynecomastia. Women who can become pregnant should not handle medication.  All of the patient's questions and concerns were addressed. Nsaids Counseling: NSAID Counseling: I discussed with the patient that NSAIDs should be taken with food. Prolonged use of NSAIDs can result in the development of stomach ulcers.  Patient advised to stop taking NSAIDs if abdominal pain occurs.  The patient verbalized understanding of the proper use and possible adverse effects of NSAIDs.  All of the patient's questions and concerns were addressed. Aklief Pregnancy And Lactation Text: It is unknown if this medication is safe to use during pregnancy.  It is unknown if this medication is excreted in breast milk.  Breastfeeding women should use the topical cream on the smallest area of the skin for the shortest time needed while breastfeeding.  Do not apply to nipple and areola. Azelaic Acid Counseling: Patient counseled that medicine may cause skin irritation and to avoid applying near the eyes.  In the event of skin irritation, the patient was advised to reduce the amount of the drug applied or use it less frequently.   The patient verbalized understanding of the proper use and possible adverse effects of azelaic acid.  All of the patient's questions and concerns were addressed. Doxepin Counseling:  Patient advised that the medication is sedating and not to drive a car after taking this medication. Patient informed of potential adverse effects including but not limited to dry mouth, urinary retention, and blurry vision.  The patient verbalized understanding of the proper use and possible adverse effects of doxepin.  All of the patient's questions and concerns were addressed. Adbry Counseling: I discussed with the patient the risks of tralokinumab including but not limited to eye infection and irritation, cold sores, injection site reactions, worsening of asthma, allergic reactions and increased risk of parasitic infection.  Live vaccines should be avoided while taking tralokinumab. The patient understands that monitoring is required and they must alert us or the primary physician if symptoms of infection or other concerning signs are noted. VTAMA Counseling: I discussed with the patient that VTAMA is not for use in the eyes, mouth or mouth. They should call the office if they develop any signs of allergic reactions to VTAMA. The patient verbalized understanding of the proper use and possible adverse effects of VTAMA.  All of the patient's questions and concerns were addressed. Solaraze Pregnancy And Lactation Text: This medication is Pregnancy Category B and is considered safe. There is some data to suggest avoiding during the third trimester. It is unknown if this medication is excreted in breast milk. Griseofulvin Pregnancy And Lactation Text: This medication is Pregnancy Category X and is known to cause serious birth defects. It is unknown if this medication is excreted in breast milk but breast feeding should be avoided. Xolair Counseling:  Patient informed of potential adverse effects including but not limited to fever, muscle aches, rash and allergic reactions.  The patient verbalized understanding of the proper use and possible adverse effects of Xolair.  All of the patient's questions and concerns were addressed. Doxycycline Pregnancy And Lactation Text: This medication is Pregnancy Category D and not consider safe during pregnancy. It is also excreted in breast milk but is considered safe for shorter treatment courses. Propranolol Counseling:  I discussed with the patient the risks of propranolol including but not limited to low heart rate, low blood pressure, low blood sugar, restlessness and increased cold sensitivity. They should call the office if they experience any of these side effects. Azithromycin Counseling:  I discussed with the patient the risks of azithromycin including but not limited to GI upset, allergic reaction, drug rash, diarrhea, and yeast infections. Arava Counseling:  Patient counseled regarding adverse effects of Arava including but not limited to nausea, vomiting, abnormalities in liver function tests. Patients may develop mouth sores, rash, diarrhea, and abnormalities in blood counts. The patient understands that monitoring is required including LFTs and blood counts.  There is a rare possibility of scarring of the liver and lung problems that can occur when taking methotrexate. Persistent nausea, loss of appetite, pale stools, dark urine, cough, and shortness of breath should be reported immediately. Patient advised to discontinue Arava treatment and consult with a physician prior to attempting conception. The patient will have to undergo a treatment to eliminate Arava from the body prior to conception. Valtrex Pregnancy And Lactation Text: this medication is Pregnancy Category B and is considered safe during pregnancy. This medication is not directly found in breast milk but it's metabolite acyclovir is present. Rinvoq Pregnancy And Lactation Text: Based on animal studies, Rinvoq may cause embryo-fetal harm when administered to pregnant women.  The medication should not be used in pregnancy.  Breastfeeding is not recommended during treatment and for 6 days after the last dose. Rifampin Counseling: I discussed with the patient the risks of rifampin including but not limited to liver damage, kidney damage, red-orange body fluids, nausea/vomiting and severe allergy. Topical Ketoconazole Counseling: Patient counseled that this medication may cause skin irritation or allergic reactions.  In the event of skin irritation, the patient was advised to reduce the amount of the drug applied or use it less frequently.   The patient verbalized understanding of the proper use and possible adverse effects of ketoconazole.  All of the patient's questions and concerns were addressed. Rifampin Pregnancy And Lactation Text: This medication is Pregnancy Category C and it isn't know if it is safe during pregnancy. It is also excreted in breast milk and should not be used if you are breast feeding. Xolair Pregnancy And Lactation Text: This medication is Pregnancy Category B and is considered safe during pregnancy. This medication is excreted in breast milk. Sotyktu Counseling:  I discussed the most common side effects of Sotyktu including: common cold, sore throat, sinus infections, cold sores, canker sores, folliculitis, and acne.? I also discussed more serious side effects of Sotyktu including but not limited to: serious allergic reactions; increased risk for infections such as TB; cancers such as lymphomas; rhabdomyolysis and elevated CPK; and elevated triglycerides and liver enzymes.? Itraconazole Counseling:  I discussed with the patient the risks of itraconazole including but not limited to liver damage, nausea/vomiting, neuropathy, and severe allergy.  The patient understands that this medication is best absorbed when taken with acidic beverages such as non-diet cola or ginger ale.  The patient understands that monitoring is required including baseline LFTs and repeat LFTs at intervals.  The patient understands that they are to contact us or the primary physician if concerning signs are noted. Ilumya Counseling: I discussed with the patient the risks of tildrakizumab including but not limited to immunosuppression, malignancy, posterior leukoencephalopathy syndrome, and serious infections.  The patient understands that monitoring is required including a PPD at baseline and must alert us or the primary physician if symptoms of infection or other concerning signs are noted. Humira Counseling:  I discussed with the patient the risks of adalimumab including but not limited to myelosuppression, immunosuppression, autoimmune hepatitis, demyelinating diseases, lymphoma, and serious infections.  The patient understands that monitoring is required including a PPD at baseline and must alert us or the primary physician if symptoms of infection or other concerning signs are noted. Glycopyrrolate Counseling:  I discussed with the patient the risks of glycopyrrolate including but not limited to skin rash, drowsiness, dry mouth, difficulty urinating, and blurred vision. Dutasteride Pregnancy And Lactation Text: This medication is absolutely contraindicated in women, especially during pregnancy and breast feeding. Feminization of male fetuses is possible if taking while pregnant. Bexarotene Counseling:  I discussed with the patient the risks of bexarotene including but not limited to hair loss, dry lips/skin/eyes, liver abnormalities, hyperlipidemia, pancreatitis, depression/suicidal ideation, photosensitivity, drug rash/allergic reactions, hypothyroidism, anemia, leukopenia, infection, cataracts, and teratogenicity.  Patient understands that they will need regular blood tests to check lipid profile, liver function tests, white blood cell count, thyroid function tests and pregnancy test if applicable. Nsaids Pregnancy And Lactation Text: These medications are considered safe up to 30 weeks gestation. It is excreted in breast milk. Picato Counseling:  I discussed with the patient the risks of Picato including but not limited to erythema, scaling, itching, weeping, crusting, and pain. Olanzapine Counseling- I discussed with the patient the common side effects of olanzapine including but are not limited to: lack of energy, dry mouth, increased appetite, sleepiness, tremor, constipation, dizziness, changes in behavior, or restlessness.  Explained that teenagers are more likely to experience headaches, abdominal pain, pain in the arms or legs, tiredness, and sleepiness.  Serious side effects include but are not limited: increased risk of death in elderly patients who are confused, have memory loss, or dementia-related psychosis; hyperglycemia; increased cholesterol and triglycerides; and weight gain. Bexarotene Pregnancy And Lactation Text: This medication is Pregnancy Category X and should not be given to women who are pregnant or may become pregnant. This medication should not be used if you are breast feeding. Doxepin Pregnancy And Lactation Text: This medication is Pregnancy Category C and it isn't known if it is safe during pregnancy. It is also excreted in breast milk and breast feeding isn't recommended. Soolantra Counseling: I discussed with the patients the risks of topial Soolantra. This is a medicine which decreases the number of mites and inflammation in the skin. You experience burning, stinging, eye irritation or allergic reactions.  Please call our office if you develop any problems from using this medication. Skyrizi Counseling: I discussed with the patient the risks of risankizumab-rzaa including but not limited to immunosuppression, and serious infections.  The patient understands that monitoring is required including a PPD at baseline and must alert us or the primary physician if symptoms of infection or other concerning signs are noted. Azelaic Acid Pregnancy And Lactation Text: This medication is considered safe during pregnancy and breast feeding. Azithromycin Pregnancy And Lactation Text: This medication is considered safe during pregnancy and is also secreted in breast milk. Propranolol Pregnancy And Lactation Text: This medication is Pregnancy Category C and it isn't known if it is safe during pregnancy. It is excreted in breast milk. Cellcept Counseling:  I discussed with the patient the risks of mycophenolate mofetil including but not limited to infection/immunosuppression, GI upset, hypokalemia, hypercholesterolemia, bone marrow suppression, lymphoproliferative disorders, malignancy, GI ulceration/bleed/perforation, colitis, interstitial lung disease, kidney failure, progressive multifocal leukoencephalopathy, and birth defects.  The patient understands that monitoring is required including a baseline creatinine and regular CBC testing. In addition, patient must alert us immediately if symptoms of infection or other concerning signs are noted. Adbry Pregnancy And Lactation Text: It is unknown if this medication will adversely affect pregnancy or breast feeding. Vtama Pregnancy And Lactation Text: It is unknown if this medication can cause problems during pregnancy and breastfeeding. Erythromycin Counseling:  I discussed with the patient the risks of erythromycin including but not limited to GI upset, allergic reaction, drug rash, diarrhea, increase in liver enzymes, and yeast infections. Drysol Counseling:  I discussed with the patient the risks of drysol/aluminum chloride including but not limited to skin rash, itching, irritation, burning. Cibinqo Counseling: I discussed with the patient the risks of Cibinqo therapy including but not limited to common cold, nausea, headache, cold sores, increased blood CPK levels, dizziness, UTIs, fatigue, acne, and vomitting. Live vaccines should be avoided.  This medication has been linked to serious infections; higher rate of mortality; malignancy and lymphoproliferative disorders; major adverse cardiovascular events; thrombosis; thrombocytopenia and lymphopenia; lipid elevations; and retinal detachment. Erythromycin Pregnancy And Lactation Text: This medication is Pregnancy Category B and is considered safe during pregnancy. It is also excreted in breast milk. Cimzia Counseling:  I discussed with the patient the risks of Cimzia including but not limited to immunosuppression, allergic reactions and infections.  The patient understands that monitoring is required including a PPD at baseline and must alert us or the primary physician if symptoms of infection or other concerning signs are noted. Clofazimine Counseling:  I discussed with the patient the risks of clofazimine including but not limited to skin and eye pigmentation, liver damage, nausea/vomiting, gastrointestinal bleeding and allergy. Topical Metronidazole Counseling: Metronidazole is a topical antibiotic medication. You may experience burning, stinging, redness, or allergic reactions.  Please call our office if you develop any problems from using this medication. Klisyri Counseling:  I discussed with the patient the risks of Klisyri including but not limited to erythema, scaling, itching, weeping, crusting, and pain. Sarecycline Counseling: Patient advised regarding possible photosensitivity and discoloration of the teeth, skin, lips, tongue and gums.  Patient instructed to avoid sunlight, if possible.  When exposed to sunlight, patients should wear protective clothing, sunglasses, and sunscreen.  The patient was instructed to call the office immediately if the following severe adverse effects occur:  hearing changes, easy bruising/bleeding, severe headache, or vision changes.  The patient verbalized understanding of the proper use and possible adverse effects of sarecycline.  All of the patient's questions and concerns were addressed. Sotyktu Pregnancy And Lactation Text: There is insufficient data to evaluate whether or not Sotyktu is safe to use during pregnancy.? ?It is not known if Sotyktu passes into breast milk and whether or not it is safe to use when breastfeeding.?? Glycopyrrolate Pregnancy And Lactation Text: This medication is Pregnancy Category B and is considered safe during pregnancy. It is unknown if it is excreted breast milk. Finasteride Male Counseling: Finasteride Counseling:  I discussed with the patient the risks of use of finasteride including but not limited to decreased libido, decreased ejaculate volume, gynecomastia, and depression. Women should not handle medication.  All of the patient's questions and concerns were addressed. Finasteride Pregnancy And Lactation Text: This medication is absolutely contraindicated during pregnancy. It is unknown if it is excreted in breast milk. Erivedge Counseling- I discussed with the patient the risks of Erivedge including but not limited to nausea, vomiting, diarrhea, constipation, weight loss, changes in the sense of taste, decreased appetite, muscle spasms, and hair loss.  The patient verbalized understanding of the proper use and possible adverse effects of Erivedge.  All of the patient's questions and concerns were addressed. Protopic Counseling: Patient may experience a mild burning sensation during topical application. Protopic is not approved in children less than 2 years of age. There have been case reports of hematologic and skin malignancies in patients using topical calcineurin inhibitors although causality is questionable. Zoryve Counseling:  I discussed with the patient that Zoryve is not for use in the eyes, mouth or vagina. The most commonly reported side effects include diarrhea, headache, insomnia, application site pain, upper respiratory tract infections, and urinary tract infections.  All of the patient's questions and concerns were addressed. Olanzapine Pregnancy And Lactation Text: This medication is pregnancy category C.   There are no adequate and well controlled trials with olanzapine in pregnant females.  Olanzapine should be used during pregnancy only if the potential benefit justifies the potential risk to the fetus.   In a study in lactating healthy women, olanzapine was excreted in breast milk.  It is recommended that women taking olanzapine should not breast feed. Isotretinoin Counseling: Patient should get monthly blood tests, not donate blood, not drive at night if vision affected, not share medication, and not undergo elective surgery for 6 months after tx completed. Side effects reviewed, pt to contact office should one occur. Hydroxyzine Counseling: Patient advised that the medication is sedating and not to drive a car after taking this medication.  Patient informed of potential adverse effects including but not limited to dry mouth, urinary retention, and blurry vision.  The patient verbalized understanding of the proper use and possible adverse effects of hydroxyzine.  All of the patient's questions and concerns were addressed. Infliximab Counseling:  I discussed with the patient the risks of infliximab including but not limited to myelosuppression, immunosuppression, autoimmune hepatitis, demyelinating diseases, lymphoma, and serious infections.  The patient understands that monitoring is required including a PPD at baseline and must alert us or the primary physician if symptoms of infection or other concerning signs are noted. Soolantra Pregnancy And Lactation Text: This medication is Pregnancy Category C. This medication is considered safe during breast feeding. Bactrim Counseling:  I discussed with the patient the risks of sulfa antibiotics including but not limited to GI upset, allergic reaction, drug rash, diarrhea, dizziness, photosensitivity, and yeast infections.  Rarely, more serious reactions can occur including but not limited to aplastic anemia, agranulocytosis, methemoglobinemia, blood dyscrasias, liver or kidney failure, lung infiltrates or desquamative/blistering drug rashes. Benzoyl Peroxide Counseling: Patient counseled that medicine may cause skin irritation and bleach clothing.  In the event of skin irritation, the patient was advised to reduce the amount of the drug applied or use it less frequently.   The patient verbalized understanding of the proper use and possible adverse effects of benzoyl peroxide.  All of the patient's questions and concerns were addressed. SSKI Counseling:  I discussed with the patient the risks of SSKI including but not limited to thyroid abnormalities, metallic taste, GI upset, fever, headache, acne, arthralgias, paraesthesias, lymphadenopathy, easy bleeding, arrhythmias, and allergic reaction. Sski Pregnancy And Lactation Text: This medication is Pregnancy Category D and isn't considered safe during pregnancy. It is excreted in breast milk. Bactrim Pregnancy And Lactation Text: This medication is Pregnancy Category D and is known to cause fetal risk.  It is also excreted in breast milk. Benzoyl Peroxide Pregnancy And Lactation Text: This medication is Pregnancy Category C. It is unknown if benzoyl peroxide is excreted in breast milk. Cibinqo Pregnancy And Lactation Text: It is unknown if this medication will adversely affect pregnancy or breast feeding.  You should not take this medication if you are currently pregnant or planning a pregnancy or while breastfeeding. Hydroxyzine Pregnancy And Lactation Text: This medication is not safe during pregnancy and should not be taken. It is also excreted in breast milk and breast feeding isn't recommended. Topical Retinoid counseling:  Patient advised to apply a pea-sized amount only at bedtime and wait 30 minutes after washing their face before applying.  If too drying, patient may add a non-comedogenic moisturizer. The patient verbalized understanding of the proper use and possible adverse effects of retinoids.  All of the patient's questions and concerns were addressed. Stelara Counseling:  I discussed with the patient the risks of ustekinumab including but not limited to immunosuppression, malignancy, posterior leukoencephalopathy syndrome, and serious infections.  The patient understands that monitoring is required including a PPD at baseline and must alert us or the primary physician if symptoms of infection or other concerning signs are noted. Ketoconazole Counseling:   Patient counseled regarding improving absorption with orange juice.  Adverse effects include but are not limited to breast enlargement, headache, diarrhea, nausea, upset stomach, liver function test abnormalities, taste disturbance, and stomach pain.  There is a rare possibility of liver failure that can occur when taking ketoconazole. The patient understands that monitoring of LFTs may be required, especially at baseline. The patient verbalized understanding of the proper use and possible adverse effects of ketoconazole.  All of the patient's questions and concerns were addressed. Cyclophosphamide Counseling:  I discussed with the patient the risks of cyclophosphamide including but not limited to hair loss, hormonal abnormalities, decreased fertility, abdominal pain, diarrhea, nausea and vomiting, bone marrow suppression and infection. The patient understands that monitoring is required while taking this medication. Cimzia Pregnancy And Lactation Text: This medication crosses the placenta but can be considered safe in certain situations. Cimzia may be excreted in breast milk. Metronidazole Counseling:  I discussed with the patient the risks of metronidazole including but not limited to seizures, nausea/vomiting, a metallic taste in the mouth, nausea/vomiting and severe allergy. Elidel Counseling: Patient may experience a mild burning sensation during topical application. Elidel is not approved in children less than 2 years of age. There have been case reports of hematologic and skin malignancies in patients using topical calcineurin inhibitors although causality is questionable. Hydroxychloroquine Counseling:  I discussed with the patient that a baseline ophthalmologic exam is needed at the start of therapy and every year thereafter while on therapy. A CBC may also be warranted for monitoring.  The side effects of this medication were discussed with the patient, including but not limited to agranulocytosis, aplastic anemia, seizures, rashes, retinopathy, and liver toxicity. Patient instructed to call the office should any adverse effect occur.  The patient verbalized understanding of the proper use and possible adverse effects of Plaquenil.  All the patient's questions and concerns were addressed. Xeljanz Counseling: I discussed with the patient the risks of Xeljanz therapy including increased risk of infection, liver issues, headache, diarrhea, or cold symptoms. Live vaccines should be avoided. They were instructed to call if they have any problems. Topical Metronidazole Pregnancy And Lactation Text: This medication is Pregnancy Category B and considered safe during pregnancy.  It is also considered safe to use while breastfeeding. Klisyri Pregnancy And Lactation Text: It is unknown if this medication can harm a developing fetus or if it is excreted in breast milk. Xelmarkoz Pregnancy And Lactation Text: This medication is Pregnancy Category D and is not considered safe during pregnancy.  The risk during breast feeding is also uncertain. Hydroxychloroquine Pregnancy And Lactation Text: This medication has been shown to cause fetal harm but it isn't assigned a Pregnancy Risk Category. There are small amounts excreted in breast milk. Minoxidil Counseling: Minoxidil is a topical medication which can increase blood flow where it is applied. It is uncertain how this medication increases hair growth. Side effects are uncommon and include stinging and allergic reactions. Tetracycline Counseling: Patient counseled regarding possible photosensitivity and increased risk for sunburn.  Patient instructed to avoid sunlight, if possible.  When exposed to sunlight, patients should wear protective clothing, sunglasses, and sunscreen.  The patient was instructed to call the office immediately if the following severe adverse effects occur:  hearing changes, easy bruising/bleeding, severe headache, or vision changes.  The patient verbalized understanding of the proper use and possible adverse effects of tetracycline.  All of the patient's questions and concerns were addressed. Patient understands to avoid pregnancy while on therapy due to potential birth defects. Cyclophosphamide Pregnancy And Lactation Text: This medication is Pregnancy Category D and it isn't considered safe during pregnancy. This medication is excreted in breast milk. Isotretinoin Pregnancy And Lactation Text: This medication is Pregnancy Category X and is considered extremely dangerous during pregnancy. It is unknown if it is excreted in breast milk. Birth Control Pills Counseling: Birth Control Pill Counseling: I discussed with the patient the potential side effects of OCPs including but not limited to increased risk of stroke, heart attack, thrombophlebitis, deep venous thrombosis, hepatic adenomas, breast changes, GI upset, headaches, and depression.  The patient verbalized understanding of the proper use and possible adverse effects of OCPs. All of the patient's questions and concerns were addressed. Oral Minoxidil Counseling- I discussed with the patient the risks of oral minoxidil including but not limited to shortness of breath, swelling of the feet or ankles, dizziness, lightheadedness, unwanted hair growth and allergic reaction.  The patient verbalized understanding of the proper use and possible adverse effects of oral minoxidil.  All of the patient's questions and concerns were addressed. Protopic Pregnancy And Lactation Text: This medication is Pregnancy Category C. It is unknown if this medication is excreted in breast milk when applied topically. Cephalexin Counseling: I counseled the patient regarding use of cephalexin as an antibiotic for prophylactic and/or therapeutic purposes. Cephalexin (commonly prescribed under brand name Keflex) is a cephalosporin antibiotic which is active against numerous classes of bacteria, including most skin bacteria. Side effects may include nausea, diarrhea, gastrointestinal upset, rash, hives, yeast infections, and in rare cases, hepatitis, kidney disease, seizures, fever, confusion, neurologic symptoms, and others. Patients with severe allergies to penicillin medications are cautioned that there is about a 10% incidence of cross-reactivity with cephalosporins. When possible, patients with penicillin allergies should use alternatives to cephalosporins for antibiotic therapy. Carac Counseling:  I discussed with the patient the risks of Carac including but not limited to erythema, scaling, itching, weeping, crusting, and pain. Metronidazole Pregnancy And Lactation Text: This medication is Pregnancy Category B and considered safe during pregnancy.  It is also excreted in breast milk. Libtayo Counseling- I discussed with the patient the risks of Libtayo including but not limited to nausea, vomiting, diarrhea, and bone or muscle pain.  The patient verbalized understanding of the proper use and possible adverse effects of Libtayo.  All of the patient's questions and concerns were addressed. Rituxan Counseling:  I discussed with the patient the risks of Rituxan infusions. Side effects can include infusion reactions, severe drug rashes including mucocutaneous reactions, reactivation of latent hepatitis and other infections and rarely progressive multifocal leukoencephalopathy.  All of the patient's questions and concerns were addressed. Zyclara Counseling:  I discussed with the patient the risks of imiquimod including but not limited to erythema, scaling, itching, weeping, crusting, and pain.  Patient understands that the inflammatory response to imiquimod is variable from person to person and was educated regarded proper titration schedule.  If flu-like symptoms develop, patient knows to discontinue the medication and contact us. Ketoconazole Pregnancy And Lactation Text: This medication is Pregnancy Category C and it isn't know if it is safe during pregnancy. It is also excreted in breast milk and breast feeding isn't recommended. Thalidomide Counseling: I discussed with the patient the risks of thalidomide including but not limited to birth defects, anxiety, weakness, chest pain, dizziness, cough and severe allergy. Colchicine Counseling:  Patient counseled regarding adverse effects including but not limited to stomach upset (nausea, vomiting, stomach pain, or diarrhea).  Patient instructed to limit alcohol consumption while taking this medication.  Colchicine may reduce blood counts especially with prolonged use.  The patient understands that monitoring of kidney function and blood counts may be required, especially at baseline. The patient verbalized understanding of the proper use and possible adverse effects of colchicine.  All of the patient's questions and concerns were addressed. Litfulo Counseling: I discussed with the patient the risks of Litfulo therapy including but not limited to upper respiratory tract infections, shingles, cold sores, and nausea. Live vaccines should be avoided.  This medication has been linked to serious infections; higher rate of mortality; malignancy and lymphoproliferative disorders; major adverse cardiovascular events; thrombosis; gastrointestinal perforations; neutropenia; lymphopenia; anemia; liver enzyme elevations; and lipid elevations. Cosentyx Counseling:  I discussed with the patient the risks of Cosentyx including but not limited to worsening of Crohn's disease, immunosuppression, allergic reactions and infections.  The patient understands that monitoring is required including a PPD at baseline and must alert us or the primary physician if symptoms of infection or other concerning signs are noted. Topical Steroids Counseling: I discussed with the patient that prolonged use of topical steroids can result in the increased appearance of superficial blood vessels (telangiectasias), lightening (hypopigmentation) and thinning of the skin (atrophy).  Patient understands to avoid using high potency steroids in skin folds, the groin or the face.  The patient verbalized understanding of the proper use and possible adverse effects of topical steroids.  All of the patient's questions and concerns were addressed. Topical Steroids Applications Pregnancy And Lactation Text: Most topical steroids are considered safe to use during pregnancy and lactation.  Any topical steroid applied to the breast or nipple should be washed off before breastfeeding. Terbinafine Counseling: Patient counseling regarding adverse effects of terbinafine including but not limited to headache, diarrhea, rash, upset stomach, liver function test abnormalities, itching, taste/smell disturbance, nausea, abdominal pain, and flatulence.  There is a rare possibility of liver failure that can occur when taking terbinafine.  The patient understands that a baseline LFT and kidney function test may be required. The patient verbalized understanding of the proper use and possible adverse effects of terbinafine.  All of the patient's questions and concerns were addressed. Albendazole Counseling:  I discussed with the patient the risks of albendazole including but not limited to cytopenia, kidney damage, nausea/vomiting and severe allergy.  The patient understands that this medication is being used in an off-label manner. Cyclosporine Counseling:  I discussed with the patient the risks of cyclosporine including but not limited to hypertension, gingival hyperplasia,myelosuppression, immunosuppression, liver damage, kidney damage, neurotoxicity, lymphoma, and serious infections. The patient understands that monitoring is required including baseline blood pressure, CBC, CMP, lipid panel and uric acid, and then 1-2 times monthly CMP and blood pressure. Low Dose Naltrexone Counseling- I discussed with the patient the potential risks and side effects of low dose naltrexone including but not limited to: more vivid dreams, headaches, nausea, vomiting, abdominal pain, fatigue, dizziness, and anxiety. Qbrexza Counseling:  I discussed with the patient the risks of Qbrexza including but not limited to headache, mydriasis, blurred vision, dry eyes, nasal dryness, dry mouth, dry throat, dry skin, urinary hesitation, and constipation.  Local skin reactions including erythema, burning, stinging, and itching can also occur. Birth Control Pills Pregnancy And Lactation Text: This medication should be avoided if pregnant and for the first 30 days post-partum. Oral Minoxidil Pregnancy And Lactation Text: This medication should only be used when clearly needed if you are pregnant, attempting to become pregnant or breast feeding. High Dose Vitamin A Counseling: Side effects reviewed, pt to contact office should one occur.

## 2023-11-26 ENCOUNTER — HEALTH MAINTENANCE LETTER (OUTPATIENT)
Age: 53
End: 2023-11-26

## 2024-04-30 ENCOUNTER — TELEPHONE (OUTPATIENT)
Dept: ANESTHESIOLOGY | Facility: CLINIC | Age: 54
End: 2024-04-30
Payer: COMMERCIAL

## 2024-04-30 NOTE — TELEPHONE ENCOUNTER
Armando Chery,    I have this patient scheduled on 5/23 at 7 AM with Dr. Cam. (She's also on the waitlist.)    She is not certain if she needs at [facility] at the appointment.     She told me that she took a fall in October 2023 and has been having increased flare ups since. She's experiencing one now, which consists of numbness and tingling in her right leg.     I asked her to also communication this information directly via [employer], and she said she would follow up later today.    Let me know if you need anything else on my end.    Thank you,  Uma

## 2024-04-30 NOTE — TELEPHONE ENCOUNTER
[facility] Call Center    Phone Message    May a detailed message be left on voicemail: yes     Reason for Call: Pt is requesting an appointment with Dr. Cam.  She was last seen in February of 2022.  I told Pt she would need a new referral since it's been over a year since she's been seen and she got upset and said that Dr. Cam placed her stimulator and he has to see her without a referral.  Please call Pt back to let her know if ok to schedule without a new referral.  Thanks.

## 2024-05-21 ENCOUNTER — TRANSFERRED RECORDS (OUTPATIENT)
Dept: HEALTH INFORMATION MANAGEMENT | Facility: CLINIC | Age: 54
End: 2024-05-21

## 2024-05-23 ENCOUNTER — OFFICE VISIT (OUTPATIENT)
Dept: ANESTHESIOLOGY | Facility: CLINIC | Age: 54
End: 2024-05-23
Payer: COMMERCIAL

## 2024-05-23 VITALS — OXYGEN SATURATION: 99 % | SYSTOLIC BLOOD PRESSURE: 130 MMHG | HEART RATE: 90 BPM | DIASTOLIC BLOOD PRESSURE: 88 MMHG

## 2024-05-23 DIAGNOSIS — M96.1 POSTLAMINECTOMY SYNDROME: Primary | ICD-10-CM

## 2024-05-23 DIAGNOSIS — Z96.89 S/P INSERTION OF SPINAL CORD STIMULATOR: ICD-10-CM

## 2024-05-23 DIAGNOSIS — M54.16 LUMBAR RADICULOPATHY: ICD-10-CM

## 2024-05-23 PROCEDURE — 99214 OFFICE O/P EST MOD 30 MIN: CPT | Performed by: ANESTHESIOLOGY

## 2024-05-23 RX ORDER — TRAMADOL HYDROCHLORIDE 50 MG/1
50 TABLET ORAL DAILY PRN
Qty: 15 TABLET | Refills: 0 | Status: SHIPPED | OUTPATIENT
Start: 2024-05-23

## 2024-05-23 RX ORDER — CYCLOBENZAPRINE HCL 10 MG
TABLET ORAL
Qty: 30 TABLET | Refills: 3 | Status: SHIPPED | OUTPATIENT
Start: 2024-05-23

## 2024-05-23 ASSESSMENT — PAIN SCALES - GENERAL: PAINLEVEL: MODERATE PAIN (4)

## 2024-05-23 ASSESSMENT — PAIN SCALES - PAIN ENJOYMENT GENERAL ACTIVITY SCALE (PEG)
PEG_TOTALSCORE: 5.33
INTERFERED_GENERAL_ACTIVITY: 5
AVG_PAIN_PASTWEEK: 4
INTERFERED_ENJOYMENT_LIFE: 7

## 2024-05-23 NOTE — LETTER
5/23/2024       RE: Pyhllis Vela  [address]  [facility] [address]       Dear Colleague,    Thank you for referring your patient, Phyllis Vela, to the [facility] [place] at [facility]. Please see a copy of my visit note below.    [facility] : Progress Note    Date of visit: 5/23/2024    Chief Complaint   Patient presents with    Follow Up     Follow-up Lower back pain - SCS         Interval history:  Phyllis Vela is a 53 year old  female who is known to me for postlaminectomy pain syndrome status post medical dorsal stimulator. Past medical history significant for anxiety, depression, chronic low back pain, and ADHD for which she has been taking Adderal.     She reports that she recently had a work-related injury and developed increased low back pain with radicular symptoms to the front and back of the thigh.  She also reports radicular symptoms with right lower extremity along lumbar L5 distribution.  She has been using spinal cord dorsal column stimulator but reports that the stimulator does not cover this new pain.  She had an MRI of the [facility].  Reports are not available.    Currently, has been managing her pain with Flexeril.  In the past she had taken tramadol 50 mg but not recently, she requests a small dose of tramadol for recent pain flare.   review shows no opioid prescription the last few months.    Her x-ray of the spine showed no migration of the leads. She states that she had lumbar disc herniation at L4-L5 6 years ago which required decompression and laminectomy. MRI in 2015 showed moderate to severe disc height loss, ligamentum flavum thickening and facet hypertrophy. She also had right hemilaminectomy at this level. There is mild disc bulges at L4-L5 level which is minimally contacted with  traversing L5 nerve roots. In addition there is a severe disc height loss at the level of L5-S1. There is moderate neuroforaminal stenosis bilaterally at the level of L5-S1.     Recommendations/plan at the last visit included:  Diagnostic Studies:  Will get MRI lumbar spine to rule out etiology of new radicular symptoms to the right lower extremity.  Patient has a SCS device which is MRI compatible.  [employer] rep advised the patient how to turn off the stimulator prior to the MRI.  Medication Management: She takes a small dose of tramadol occasionally.  She does not want tramadol refill:.  Further procedures recommended: We will review MRI.  Future pain procedure will be based on MRI result.  Reprogramming of the spinal cord stimulator performed today by [employer] rep Arnold Basilio  Follow up: 1 month.         [place] Prescription Monitoring Program:   Reviewed. No concerns     Review of Systems:  The 14 system ROS was reviewed and was negative except what is documented above and as follows.  Any bowel or bladder problems: none  Mood: okay    Physical Exam:  Vitals:    05/23/24 0648   BP: (!) 144/94   BP Location: Right arm   Patient Position: Chair   Cuff Size: Adult Large   Pulse: 90   SpO2: 99%       General: Awake in no apparent distress.   Eyes: Sclerae are anicteric. PERRLA, EOMI   Neck: supple, no masses.   Lungs: unlabored.   Heart: regular rate and rhythm   Abdomen: soft non tender.  Extremities: Pulses are well palpable, no peripheral edema.   Musculoskeletal: 5/5 muscle strength in all extremities.   Neurologic exam:Sensation intact throughout all dermatomes bilateral upper extremities and lower extremities  Psychiatric; Normal affect.   Skin: Warm and Dry.    Medications:  Current Outpatient Medications   Medication Sig Dispense Refill    albuterol (PROAIR HFA/PROVENTIL HFA/VENTOLIN HFA) 108 (90 BASE) MCG/ACT Inhaler Inhale 2 puffs into the lungs as needed      amphetamine-dextroamphetamine (ADDERALL  XR) 30 MG per 24 hr capsule Take 20 mg by mouth every morning Takes two tablets in the morning equalling 40 mg a day.      Calcium-Magnesium-Zinc 333-133-5 MG TABS per tablet Take 1 tablet by mouth every morning       cetirizine (ZYRTEC) 10 MG tablet Take 10 mg by mouth every morning       chlorhexidine (HIBICLENS) 4 % liquid Wash night before and morning of surgery, per clinic instructions 118 mL 0    cholecalciferol (VITAMIN D3) 1000 UNIT tablet Take 1,000 Units by mouth every morning       CHROMIUM PO Take 1 tablet by mouth as needed       cyclobenzaprine (FLEXERIL) 10 MG tablet TAKE 1 TABLET(10 MG) BY MOUTH EVERY NIGHT AS NEEDED FOR MUSCLE SPASMS 30 tablet 3    dextromethorphan-guaiFENesin (MUCINEX DM)  MG 12 hr tablet Take 1 tablet by mouth every morning      lidocaine (XYLOCAINE) 5 % ointment Apply 1 g topically 4 times daily as needed for moderate pain 50 g 1    metFORMIN (GLUCOPHAGE) 500 MG tablet Take 500 mg by mouth      Multiple Vitamins-Minerals (QC WOMENS DAILY MULTIVITAMIN) TABS Take 1 tablet by mouth every morning       traMADol (ULTRAM) 50 MG tablet Take 1 tablet (50 mg) by mouth every 6 hours as needed for severe pain 30 tablet 0    fluticasone (FLONASE) 50 MCG/ACT nasal spray 2 times daily          Analgesic Medications:   Medications related to Pain Management (From now, onward)      None               LABORATORY VALUES:   Recent Labs   Lab Test 04/14/21  1020      POTASSIUM 4.2   CHLORIDE 104   CO2 30   ANIONGAP 2*   GLC 86   BUN 11   CR 0.85   SUSAN 9.0       CBC RESULTS:   Recent Labs   Lab Test 04/14/21  1020   WBC 7.9   RBC 4.48   HGB 14.7   HCT 42.8   MCV 96   MCH 32.8   MCHC 34.3   RDW 12.4          Most Recent 3 INR's:  Recent Labs   Lab Test 04/14/21  1020   INR 0.90           ASSESSMENT:       Diagnoses         Codes Comments    Postlaminectomy syndrome    -  Primary M96.1     Lumbar radiculopathy     M54.16     S/P insertion of spinal cord stimulator     Z96.89              PLAN:    Diagnostic Studies: will review the most recent MRI performed at [facility] orth[facility].   Medication Management: She takes a small dose of tramadol occasionally.  15 tablets dispensed today with no refill.  Also I refilled Flexeril prescription.  PT referral for core-strengthening exercises to relieve back pain include the pelvic tilt, cat-cow pose, bird dog, high and low planks, crunches, and exercises performed using a Swiss ball (or exercise ball).  Further procedures recommended: Discussed about lumbar epidural steroid injection for radiculopathy symptoms.  She will try conservative therapy for now.  Reprogramming of the spinal cord stimulator performed today by [employer] rep Arnold Basilio  Follow up: As needed      Assessment will be ongoing with changes in treatment as indicated.  Benefits/risks/alternatives to treatment have been reviewed and the patient has been instructed to contact this office if they have any questions or concerns.  This plan of care has been discussed with the patient and the patient is in agreement.         Again, thank you for allowing me to participate in the care of your patient.      Sincerely,    Nando Cam MD

## 2024-05-23 NOTE — PROGRESS NOTES
[facility] for Comprehensive Chronic Pain Management : Progress Note    Date of visit: 5/23/2024    Chief Complaint   Patient presents with    Follow Up     Follow-up Lower back pain - SCS         Interval history:  Phyllis Vela is a 53 year old  female who is known to me for postlaminectomy pain syndrome status post medical dorsal stimulator. Past medical history significant for anxiety, depression, chronic low back pain, and ADHD for which she has been taking Adderal.     She reports that she recently had a work-related injury and developed increased low back pain with radicular symptoms to the front and back of the thigh.  She also reports radicular symptoms with right lower extremity along lumbar L5 distribution.  She has been using spinal cord dorsal column stimulator but reports that the stimulator does not cover this new pain.  She had an MRI of the [facility].  Reports are not available.    Currently, has been managing her pain with Flexeril.  In the past she had taken tramadol 50 mg but not recently, she requests a small dose of tramadol for recent pain flare.   review shows no opioid prescription the last few months.    Her x-ray of the spine showed no migration of the leads. She states that she had lumbar disc herniation at L4-L5 6 years ago which required decompression and laminectomy. MRI in 2015 showed moderate to severe disc height loss, ligamentum flavum thickening and facet hypertrophy. She also had right hemilaminectomy at this level. There is mild disc bulges at L4-L5 level which is minimally contacted with traversing L5 nerve roots. In addition there is a severe disc height loss at the level of L5-S1. There is moderate neuroforaminal stenosis bilaterally at the level of L5-S1.     Recommendations/plan at the last visit included:  Diagnostic Studies:  Will get MRI lumbar spine to rule out etiology of new radicular symptoms to the right lower  extremity.  Patient has a SCS device which is MRI compatible.  [employer] rep advised the patient how to turn off the stimulator prior to the MRI.  Medication Management: She takes a small dose of tramadol occasionally.  She does not want tramadol refill:.  Further procedures recommended: We will review MRI.  Future pain procedure will be based on MRI result.  Reprogramming of the spinal cord stimulator performed today by [employer] rep Arnold Basilio  Follow up: 1 month.         [place] Prescription Monitoring Program:   Reviewed. No concerns     Review of Systems:  The 14 system ROS was reviewed and was negative except what is documented above and as follows.  Any bowel or bladder problems: none  Mood: okay    Physical Exam:  Vitals:    05/23/24 0648   BP: (!) 144/94   BP Location: Right arm   Patient Position: Chair   Cuff Size: Adult Large   Pulse: 90   SpO2: 99%       General: Awake in no apparent distress.   Eyes: Sclerae are anicteric. PERRLA, EOMI   Neck: supple, no masses.   Lungs: unlabored.   Heart: regular rate and rhythm   Abdomen: soft non tender.  Extremities: Pulses are well palpable, no peripheral edema.   Musculoskeletal: 5/5 muscle strength in all extremities.   Neurologic exam:Sensation intact throughout all dermatomes bilateral upper extremities and lower extremities  Psychiatric; Normal affect.   Skin: Warm and Dry.    Medications:  Current Outpatient Medications   Medication Sig Dispense Refill    albuterol (PROAIR HFA/PROVENTIL HFA/VENTOLIN HFA) 108 (90 BASE) MCG/ACT Inhaler Inhale 2 puffs into the lungs as needed      amphetamine-dextroamphetamine (ADDERALL XR) 30 MG per 24 hr capsule Take 20 mg by mouth every morning Takes two tablets in the morning equalling 40 mg a day.      Calcium-Magnesium-Zinc 333-133-5 MG TABS per tablet Take 1 tablet by mouth every morning       cetirizine (ZYRTEC) 10 MG tablet Take 10 mg by mouth every morning       chlorhexidine (HIBICLENS) 4 % liquid Wash night  before and morning of surgery, per clinic instructions 118 mL 0    cholecalciferol (VITAMIN D3) 1000 UNIT tablet Take 1,000 Units by mouth every morning       CHROMIUM PO Take 1 tablet by mouth as needed       cyclobenzaprine (FLEXERIL) 10 MG tablet TAKE 1 TABLET(10 MG) BY MOUTH EVERY NIGHT AS NEEDED FOR MUSCLE SPASMS 30 tablet 3    dextromethorphan-guaiFENesin (MUCINEX DM)  MG 12 hr tablet Take 1 tablet by mouth every morning      lidocaine (XYLOCAINE) 5 % ointment Apply 1 g topically 4 times daily as needed for moderate pain 50 g 1    metFORMIN (GLUCOPHAGE) 500 MG tablet Take 500 mg by mouth      Multiple Vitamins-Minerals (QC WOMENS DAILY MULTIVITAMIN) TABS Take 1 tablet by mouth every morning       traMADol (ULTRAM) 50 MG tablet Take 1 tablet (50 mg) by mouth every 6 hours as needed for severe pain 30 tablet 0    fluticasone (FLONASE) 50 MCG/ACT nasal spray 2 times daily          Analgesic Medications:   Medications related to Pain Management (From now, onward)      None               LABORATORY VALUES:   Recent Labs   Lab Test 04/14/21  1020      POTASSIUM 4.2   CHLORIDE 104   CO2 30   ANIONGAP 2*   GLC 86   BUN 11   CR 0.85   SUSAN 9.0       CBC RESULTS:   Recent Labs   Lab Test 04/14/21  1020   WBC 7.9   RBC 4.48   HGB 14.7   HCT 42.8   MCV 96   MCH 32.8   MCHC 34.3   RDW 12.4          Most Recent 3 INR's:  Recent Labs   Lab Test 04/14/21  1020   INR 0.90           ASSESSMENT:       Diagnoses         Codes Comments    Postlaminectomy syndrome    -  Primary M96.1     Lumbar radiculopathy     M54.16     S/P insertion of spinal cord stimulator     Z96.89             PLAN:    Diagnostic Studies: will review the most recent MRI performed at [facility].   Medication Management: She takes a small dose of tramadol occasionally.  15 tablets dispensed today with no refill.  Also I refilled Flexeril prescription.  PT referral for core-strengthening exercises to relieve back pain include the  pelvic tilt, cat-cow pose, bird dog, high and low planks, crunches, and exercises performed using a Swiss ball (or exercise ball).  Further procedures recommended: Discussed about lumbar epidural steroid injection for radiculopathy symptoms.  She will try conservative therapy for now.  Reprogramming of the spinal cord stimulator performed today by [employer] rep Arnold Basilio  Follow up: As needed      Assessment will be ongoing with changes in treatment as indicated.  Benefits/risks/alternatives to treatment have been reviewed and the patient has been instructed to contact this office if they have any questions or concerns.  This plan of care has been discussed with the patient and the patient is in agreement.     Nando Cam MD, PHD

## 2024-05-23 NOTE — PATIENT INSTRUCTIONS
Medications:    cyclobenzaprine (FLEXERIL) 10 MG tablet.     traMADol (ULTRAM) 50 MG tablet      *Please provide the clinic with a minium of 1 week notice, on all prescription refills.       Referrals:     Physical Therapy Referral placed-   If you have not heard from the scheduling office within 2 business days, please call [phone] for all locations       Recommended Follow up:      Follow up as needed.     To speak with a nurse, schedule/reschedule/cancel a clinic appointment, or request a medication refill call: [phone]    You can also reach us by [employer]: [email or web address]

## 2024-05-23 NOTE — NURSING NOTE
Patient presents with:  Follow Up: Follow-up Lower back pain - SCS      Moderate Pain (4)     Pain Medications       Opioid Agonists Refills Start End     traMADol (ULTRAM) 50 MG tablet 0 6/1/2022 --    Sig - Route: Take 1 tablet (50 mg) by mouth every 6 hours as needed for severe pain - Oral    Class: E-Prescribe    Prior authorization: Approved            What medications are you using for pain? SCS, Tramadol, Flexeril    (New patients only) Have you been seen by another pain clinic/ provider? no    (Return Patients only) What refills are you needing today? no

## 2024-06-23 ENCOUNTER — HEALTH MAINTENANCE LETTER (OUTPATIENT)
Age: 54
End: 2024-06-23

## 2024-07-17 ENCOUNTER — TELEPHONE (OUTPATIENT)
Dept: ANESTHESIOLOGY | Facility: CLINIC | Age: 54
End: 2024-07-17
Payer: COMMERCIAL

## 2024-07-17 NOTE — TELEPHONE ENCOUNTER
Left Voicemail (1st Attempt) and Sent Mychart (1st Attempt) for the patient to call back and schedule the following:    Appointment type: Return pain  Provider: Dr. Cam  Return date: First available  Specialty phone number: [phone]

## 2024-08-14 ENCOUNTER — OFFICE VISIT (OUTPATIENT)
Dept: ANESTHESIOLOGY | Facility: CLINIC | Age: 54
End: 2024-08-14
Payer: OTHER MISCELLANEOUS

## 2024-08-14 VITALS
HEART RATE: 105 BPM | SYSTOLIC BLOOD PRESSURE: 138 MMHG | OXYGEN SATURATION: 98 % | RESPIRATION RATE: 16 BRPM | DIASTOLIC BLOOD PRESSURE: 93 MMHG

## 2024-08-14 DIAGNOSIS — M51.360 DISCOGENIC LOW BACK PAIN: Primary | ICD-10-CM

## 2024-08-14 PROCEDURE — 99214 OFFICE O/P EST MOD 30 MIN: CPT | Mod: GC | Performed by: ANESTHESIOLOGY

## 2024-08-14 ASSESSMENT — PAIN SCALES - PAIN ENJOYMENT GENERAL ACTIVITY SCALE (PEG)
PEG_TOTALSCORE: 5
AVG_PAIN_PASTWEEK: 5
INTERFERED_ENJOYMENT_LIFE: 5
INTERFERED_GENERAL_ACTIVITY: 5

## 2024-08-14 ASSESSMENT — PAIN SCALES - GENERAL: PAINLEVEL: MODERATE PAIN (5)

## 2024-08-14 NOTE — PATIENT INSTRUCTIONS
Procedures:    Call to schedule your procedure: [phone] option #2  INJECTION, ANESTHETIC/STEROID, TRANSFORAMINAL EPIDURAL; LUMBAR/SACRAL, SINGLE LEVEL    Your pre-procedure instructions are below, please call our clinic if you have any questions.      Recommended Follow up:      Follow up as needed.       To speak with a nurse, schedule/reschedule/cancel a clinic appointment, or request a medication refill call: [phone].    You can also reach us by [employer]: [email or web address]      Procedure Information:     Please call [phone] option #2 to schedule, reschedule, or cancel your procedure appointment.   Phones are answered Monday - Friday from 08:00 - 4:30pm.  Leave a voicemail with your name, birth date, and phone number if no one is available to take your call.        You no longer need to test for COVID- 19 prior to your procedure/surgery, unless your physician specifically requests that you test. If you experience COVID symptoms or have tested positive for COVID-19 within 14 days of your scheduled surgery or procedure, please update our office right away and your procedure may have to be postponed.       The procedure center staff will call you several days before the procedure to review important information that you will need to know for the day of the procedure.     Please contact the clinic if you have further questions about this information [phone].        Information related to Scheduling and Pre-Procedure Instructions:    If you must reschedule your procedure more than two times, you must follow up in clinic before rescheduling again.      Preparing for your procedure    CAUTION - FAILURE TO FOLLOW THESE PRE-PROCEDURE INSTRUCTIONS WILL RESULT IN YOUR PROCEDURE BEING RESCHEDULED.    Your Procedure: INJECTION, ANESTHETIC/STEROID, TRANSFORAMINAL EPIDURAL; LUMBAR/SACRAL, SINGLE LEVEL        You must have a  take you home after your procedure. Transportation by  taxi or para-transit is okay as long as you have a responsible adult accompany you. You must provide your 's full name and contact number at time of check in.     Fasting Protocol Please have nothing to eat or drink 2 hour prior to arrival.     Medications If you take any medications, DO NOT STOP. Take your medications as usual the day of your procedure with a sip of water AT LEAST 2 HOURS PRIOR TO ARRIVAL.    Antibiotics If you are currently taking antibiotics, you must complete the entire dose 7 days prior to your scheduled procedure. You must be clear of any signs or symptoms of infection. If you begin antibiotics, please contact our clinic for instructions.     Fever, Chills, or Rash If you experience a fever of higher than 100 degrees, chills, rash, or open wounds during the one week before your procedure, please call the clinic to see if you may proceed with your procedure.        To speak with a nurse, schedule/reschedule/cancel a clinic appointment, or request a medication refill call: [phone]    You can also reach us by [employer]: [email or web address]

## 2024-08-14 NOTE — NURSING NOTE
Patient presents with:  Pain  RECHECK: Follow up, would like an injection      Moderate Pain (5)     Pain Medications       Opioid Agonists Refills Start End     traMADol (ULTRAM) 50 MG tablet 0 5/23/2024 --    Sig - Route: Take 1 tablet (50 mg) by mouth daily as needed for severe pain - Oral    Class: E-Prescribe    No prior authorization was found for this prescription.    Found prior authorization for another prescription for the same medication: Approved          What medications are you using for pain? Tramadol, Ibuprofen      (Return Patients only) What refills are you needing today? no    Expectations: discuss steroid injection and see what the future holds     Zulema Winston LPN

## 2024-08-14 NOTE — PROGRESS NOTES
[facility] for Comprehensive Chronic Pain Management : Progress Note    Date of visit: 08/14/2024    LBP      Interval history:  Phyllis Vela is a 53 year old with postlaminectomy pain syndrome s/p medical dorsal stimulator. Past medical history significant for anxiety, depression, chronic low back pain, and ADHD for which she has been taking Adderal.      She presented with low back pain with radicular symptoms to  back of the thigh, posterior calf, going down on top of her foot.  She also reports radicular symptoms with right lower extremity along lumbar L5-S1 distribution.  She has been using spinal cord dorsal column stimulator but reports that the stimulator does not cover this new pain.  She had an MRI of the [facility] showing disc bulging at L3, coming into contact with the right-sided L3 nerve root. A steroid epidural injection was done in the middle of June with 50% decrease in her LBP with no improvement in her radicular pain           Since the last visit  Her x-ray reviewed  showed no migration of the leads.  She had an MRI of the [facility] showing disc bulging at L3, coming into contact with the right-sided L3 nerve root.   She is status post L3-L4 epidural steroid injection, which improved back pain but not the radicular component of the pain.  Her MRI shows severe disc height loss at L4-L5 and L5-S1 which likely contributing to the current pain.  She is interested in lower lumbar epidural steroid injection to be performed at [facility] Prescription Monitoring Program:   Reviewed. No concerns     Review of Systems:  The 14 system ROS was reviewed and was negative except what is documented above and as follows.  Any bowel or bladder problems: none  Mood: okay    Physical Exam:  Vital signs:      BP: (!) 138/93 Pulse: 105   Resp: 16 SpO2: 98 %          Estimated body mass index is 25.44 kg/m  as calculated from the following:    Height  "as of 5/6/21: 1.689 m (5' 6.5\").    Weight as of 5/6/21: 72.6 kg (160 lb).           Musculoskeletal: 5/5 muscle strength in all extremities.   Neurologic exam:Sensation intact throughout all dermatomes bilateral upper extremities and lower extremities  SI joint examination  CHRISS: Positive  Gaenslen's: Positive  Iliac SI distraction: Positive  Thigh thrust: Positive  Total: 4/6     Normal ROM in lumbar flexion, extension  Nontender to palpation of the midline lumbar spine or lumbar paraspinals bilaterally  Normal sensation to light touch in the L3-S1 distributions bilaterally   No ankle clonus bilaterally    Straight leg raise: Positive right 50 degree    Medications:  Tramadol PRN  Flexeril PRN      Assessment:    Postlaminectomy syndrome   Lumbar radiculopathy     S/P insertion of spinal cord stimulator    SI joint pain  Recommendations    Diagnostic Studies:  Will get MRI images from [facility].  Not available in our system.   Medication Management: She takes a small dose of tramadol occasionally.  No medicine dispensed today  Further procedures recommended: External referral for L5-S1 epidural steroid injection per her request.   Reprogramming of the spinal cord stimulator performed today by [employer] rep  Follow up: 6 month.         Nette Barbosa MD  Chronic Pain Fellow PGY-6     Physician Attestation   I saw and evaluated Phyllis Vela.  I agree with above history, review of systems, physical exam and plan. I have reviewed the content of the documentation and have edited it as needed. I have personally performed the services documented here and the documentation accurately represents those services and the decisions I have made.     Nando Cma MD, PhD    [facility] FOR COMPREHENSIVE PAIN MANAGEMENT [address]  5TH FLOOR  [facility] [address] [phone]  Dept: [phone]                          "

## 2024-08-14 NOTE — LETTER
8/14/2024       RE: Phyllis Vela  [address] [place] [facility] [address]     Dear Colleague,    Thank you for referring your patient, Phyllis Vela, to the [facility] FOR COMPREHENSIVE PAIN MANAGEMENT [place] at [facility]. Please see a copy of my visit note below.    [facility] for Comprehensive Chronic Pain Management : Progress Note    Date of visit: 08/14/2024    LBP      Interval history:  Phyllis Vela is a 53 year old with postlaminectomy pain syndrome s/p medical dorsal stimulator. Past medical history significant for anxiety, depression, chronic low back pain, and ADHD for which she has been taking Adderal.      She presented with low back pain with radicular symptoms to  back of the thigh, posterior calf, going down on top of her foot.  She also reports radicular symptoms with right lower extremity along lumbar L5-S1 distribution.  She has been using spinal cord dorsal column stimulator but reports that the stimulator does not cover this new pain.  She had an MRI of the [facility] showing disc bulging at L3, coming into contact with the right-sided L3 nerve root. A steroid epidural injection was done in the middle of June with 50% decrease in her LBP with no improvement in her radicular pain           Since the last visit  Her x-ray reviewed  showed no migration of the leads.  She had an MRI of the [facility] showing disc bulging at L3, coming into contact with the right-sided L3 nerve root.   She is status post L3-L4 epidural steroid injection, which improved back pain but not the radicular component of the pain.  Her MRI shows severe disc height loss at L4-L5 and L5-S1 which likely contributing to the current pain.  She is interested in lower lumbar epidural steroid injection to be performed at [facility] Prescription Monitoring Program:   Reviewed. No concerns     Review  "of Systems:  The 14 system ROS was reviewed and was negative except what is documented above and as follows.  Any bowel or bladder problems: none  Mood: okay    Physical Exam:  Vital signs:      BP: (!) 138/93 Pulse: 105   Resp: 16 SpO2: 98 %          Estimated body mass index is 25.44 kg/m  as calculated from the following:    Height as of 5/6/21: 1.689 m (5' 6.5\").    Weight as of 5/6/21: 72.6 kg (160 lb).           Musculoskeletal: 5/5 muscle strength in all extremities.   Neurologic exam:Sensation intact throughout all dermatomes bilateral upper extremities and lower extremities  SI joint examination  CHRISS: Positive  Gaenslen's: Positive  Iliac SI distraction: Positive  Thigh thrust: Positive  Total: 4/6     Normal ROM in lumbar flexion, extension  Nontender to palpation of the midline lumbar spine or lumbar paraspinals bilaterally  Normal sensation to light touch in the L3-S1 distributions bilaterally   No ankle clonus bilaterally    Straight leg raise: Positive right 50 degree    Medications:  Tramadol PRN  Flexeril PRN      Assessment:    Postlaminectomy syndrome   Lumbar radiculopathy     S/P insertion of spinal cord stimulator    SI joint pain  Recommendations    Diagnostic Studies:  Will get MRI images from [facility].  Not available in our system.   Medication Management: She takes a small dose of tramadol occasionally.  No medicine dispensed today  Further procedures recommended: External referral for L5-S1 epidural steroid injection per her request.   Reprogramming of the spinal cord stimulator performed today by [employer] rep  Follow up: 6 month.         Nette Barbosa MD  Chronic Pain Fellow PGY-6     Physician Attestation  I saw and evaluated Phyllis Vela.  I agree with above history, review of systems, physical exam and plan. I have reviewed the content of the documentation and have edited it as needed. I have personally performed the services documented here and the documentation accurately " represents those services and the decisions I have made.     Nando Cam MD, PhD    [facility] FOR COMPREHENSIVE PAIN MANAGEMENT [address]  5TH FLOOR  [facility] [id number] [phone]  Dept: [phone]                            Again, thank you for allowing me to participate in the care of your patient.      Sincerely,    Nando Cam MD

## 2024-09-10 ENCOUNTER — TELEPHONE (OUTPATIENT)
Dept: ANESTHESIOLOGY | Facility: CLINIC | Age: 54
End: 2024-09-10
Payer: COMMERCIAL

## 2024-09-10 NOTE — TELEPHONE ENCOUNTER
[facility] Call Center    Phone Message    May a detailed message be left on voicemail: yes     Reason for Call: Asya from [facility] called.  She has questions about the Epidural Injection order that she received from Dr. Cam.  Looking for laterality and levels for her injection.  She is scheduled in 2 weeks. Please call her back to let her know.  Thanks.

## 2024-09-11 NOTE — TELEPHONE ENCOUNTER
RN returned call to [facility] and spoke with staff. Writer confirmed the injection order is for Left L5/S1 Epidural Steroid Injection.     Miri Dozier RN

## 2024-09-19 ENCOUNTER — TRANSFERRED RECORDS (OUTPATIENT)
Dept: HEALTH INFORMATION MANAGEMENT | Facility: CLINIC | Age: 54
End: 2024-09-19
Payer: COMMERCIAL

## 2024-11-25 NOTE — PROGRESS NOTES
Subjective:  HPI  Physical Exam                    Objective:  System    Physical Exam    General     ROS    Assessment/Plan:    SUBJECTIVE  Subjective changes as noted by pt:  Pt notes overall body soreness after dancing at a wedding over the weekend.      Current pain level: 4/10     Changes in function:  Pt notes soreness after dancing and wearing high heels     Adverse reaction to treatment or activity:  None    OBJECTIVE  Changes in objective findings:    Decreased muscle guarding and adhesions quadratus lumborum and lumbar paraspinals         ASSESSMENT  Phyllis continues to require intervention to meet STG and LTG's: PT  Patient's symptoms are resolving.  Response to therapy has shown an improvement in  Muscle guarding  Progress made towards STG/LTG?  Yes,     PLAN  Current treatment program is being advanced to more complex exercises.    PTA/ATC plan:  N/A    Please refer to the daily flowsheet for treatment today, total treatment time and time spent performing 1:1 timed codes.        
Subjective:SUBJECTIVE  Subjective changes as noted by pt:  Pt notes overall body soreness after dancing at a wedding over the weekend.      Current pain level: 4/10     Changes in function:  Pt notes soreness after dancing and wearing high heels     Adverse reaction to treatment or activity:  None     OBJECTIVE  Changes in objective findings:    Decreased muscle guarding and adhesions quadratus lumborum and lumbar paraspinals         HPI  Physical Exam                    Objective:  System    Physical Exam    General     ROS    Assessment/Plan:    ASSESSMENT/PLAN  Updated problem list and treatment plan: Diagnosis 1:  Myofascial pain  Pain -  hot/cold therapy, manual therapy, self management, education and home program  Decreased ROM/flexibility - manual therapy, therapeutic exercise, therapeutic activity and home program  Decreased strength - therapeutic exercise, therapeutic activities and home program  Progress toward STG/LTGs have been made:  Yes,   Assessment of Progress: The patient's condition is improving.  Self Management Plans:  Patient has been instructed in a home treatment program.  I have re-evaluated this patient and find that the nature, scope, duration and intensity of the therapy is appropriate for the medical condition of the patient.  Phyllis continues to require the following intervention to meet STG and LT's:  PT    Recommendations:  Pt has not returned for treatment since 12-13-21. Pt discharged at this time.     Please refer to the daily flowsheet for treatment today, total treatment time and time spent performing 1:1 timed codes.                    
yes...

## 2024-12-10 ENCOUNTER — TELEPHONE (OUTPATIENT)
Dept: ANESTHESIOLOGY | Facility: CLINIC | Age: 54
End: 2024-12-10
Payer: COMMERCIAL

## 2024-12-10 NOTE — TELEPHONE ENCOUNTER
Patient confirmed scheduled appointment:     Date: 1/2/25  Time: 9:15 AM  Visit type: Return Pain  Visit mode: In Person  Provider: Dr. Nando Cam  [place] [facility]    Additional Notes: Scheduled with [facility] per Su Crain from in-basket request from Miri Dozier; send message to  re: adding rep to appt. eyr

## 2025-01-02 ENCOUNTER — OFFICE VISIT (OUTPATIENT)
Dept: ANESTHESIOLOGY | Facility: CLINIC | Age: 55
End: 2025-01-02
Payer: OTHER MISCELLANEOUS

## 2025-01-02 VITALS — OXYGEN SATURATION: 97 % | DIASTOLIC BLOOD PRESSURE: 92 MMHG | HEART RATE: 84 BPM | SYSTOLIC BLOOD PRESSURE: 146 MMHG

## 2025-01-02 DIAGNOSIS — M96.1 POSTLAMINECTOMY SYNDROME OF LUMBAR REGION: Primary | ICD-10-CM

## 2025-01-02 DIAGNOSIS — Z96.89 S/P INSERTION OF SPINAL CORD STIMULATOR: ICD-10-CM

## 2025-01-02 DIAGNOSIS — Z02.6 ENCOUNTER RELATED TO WORKER'S COMPENSATION CLAIM: ICD-10-CM

## 2025-01-02 DIAGNOSIS — M51.360 DISCOGENIC LOW BACK PAIN: ICD-10-CM

## 2025-01-02 DIAGNOSIS — M54.16 LUMBAR RADICULOPATHY: ICD-10-CM

## 2025-01-02 RX ORDER — TRAMADOL HYDROCHLORIDE 50 MG/1
50 TABLET ORAL DAILY PRN
Qty: 15 TABLET | Refills: 0 | Status: SHIPPED | OUTPATIENT
Start: 2025-01-02

## 2025-01-02 RX ORDER — PSEUDOEPHEDRINE HCL 30 MG/1
30 TABLET, FILM COATED ORAL EVERY 4 HOURS PRN
COMMUNITY

## 2025-01-02 RX ORDER — CYCLOBENZAPRINE HCL 10 MG
TABLET ORAL
Qty: 60 TABLET | Refills: 3 | Status: SHIPPED | OUTPATIENT
Start: 2025-01-02

## 2025-01-02 ASSESSMENT — PAIN SCALES - GENERAL
PAINLEVEL_OUTOF10: MODERATE PAIN (4)
PAINLEVEL_OUTOF10: MODERATE PAIN (4)

## 2025-01-02 NOTE — NURSING NOTE
Chief Complaint   Patient presents with    RECHECK       Right leg weakness and nerve pain, numbness. Down lateral side of legs. Needs flexeril refill.    Aidee Chahal, EMT

## 2025-01-02 NOTE — PROGRESS NOTES
[facility] : Consultation Note    Patient: Phyllis Vela Age: 54 year old   MRN: [id number] Referred by:  No ref. provider found     Date of Visit: January 2, 2025    Reason for consultation:    Phyllis Vela is a 54 year old female who is seen in consultation today at the request of her provider,Dr. Albarado ref. provider found for a comprehensive evaluation and management of pain.  Primary Care Provider is Sonya Beasley      Chief complaints:    Chief Complaint   Patient presents with    RECHECK         History of Present illness:       Phyllis Vela is a 54-year-old female with a significant history of chronic lower back pain secondary to postlaminectomy pain syndrome, managed with a spinal cord stimulator. She reports a recent exacerbation of her pain, which prompted her to receive an epidural steroid injection at [facility]. Her lumbar spine surgery history includes a right L4-5 discectomy performed by Dr. Mai on May 8, 2015. Since then, she has had two spinal cord stimulators placed: the first in 2018 by Dr. Vallecillo at [facility] pain clinic, followed by a replacement in 2021 by me. She reports that the current stimulator is still functioning, but unfortunately, it has not been effective in managing all of her symptoms following a work-related injury on April 25, 2024.    Phyllis has also undergone 18 weeks of physical therapy, which she found beneficial; however, her therapist has indicated that there is little more they can do for her at this point. She has received two injections: one in June 2024 for a right L3-4 transforaminal, which is wearing off, and a left L5-S1 transforaminal injection on September 19, 2024, which provided good relief. Both injections were administered at [facility].     Her MRI of the lumbar spine show degeneration of the discs at the L4-5 and L5-S1 levels. At the L5-S1 level, there is anterior pressure on  the exiting 5th nerves bilaterally, with the right side being more affected, consistent with her clinical presentation.     Phyllis reports that her symptoms have been worsening overall. She now experiences right-sided L5 distribution pain, along with numbness and tingling in both thighs. She notes that previously, changing positions or sleeping in a recliner provided relief, but this is no longer effective. She also experiences increased tingling in her bilateral lower extremities when lying down, requiring her to shift positions frequently for comfort. In addition to her spinal cord stimulator, she has tried dry needling in the past and is currently using Flexeril as needed for her discomfort.    Today, Phyllis presented for spinal cord stimulator reprogramming and additional imaging to rule out lead migration. She also requested refills for her medications, Flexeril and tramadol. Additionally, she wanted to get a second opinion from an orthopedic surgeon regarding possible surgical options for her L5 radiculopathy symptoms, which are new and not currently addressed by her spinal cord stimulator.      [place] Prescription Monitoring Program:   Reviewed. No concerns    Review of Systems:  ROS    Patient Supplied Answers To the  Pain Questionnaire      2/19/2019    11:35 AM    Pain -  Patient Entered Questionnaire/Answers   What number best describes your pain right now:  0 = No pain  to  10 = Worst pain imaginable 4                5/21/2018     7:31 AM 7/10/2018     1:03 PM 8/28/2018     9:07 AM   JESÚS-7 SCORE   Total Score 1 (minimal anxiety) 0 (minimal anxiety) 1 (minimal anxiety)   Total Score 1 0 1            1/2/2025     9:05 AM 5/23/2024     6:49 AM   PHQ-2 ( 1999 [employer])   Q1: Little interest or pleasure in doing things 0 0   Q2: Feeling down, depressed or hopeless 0 0   PHQ-2 Score 0 0             No data to display                   Past Medical History:  Past Medical History:   Diagnosis Date    ADD  (attention deficit disorder) 2013    ADHD (attention deficit hyperactivity disorder)     Chronic back pain 2012    DDD (degenerative disc disease), lumbar 2011    Depressive disorder     Diabetes (H)     border line    Generalized anxiety disorder 2011    Head injury     concussion as a child    Hyperglycemia 2010    Hyperglyceridemia 2010    Insomnia 10/31/2013    Lumbar stenosis 2011    Other chronic pain     PONV (postoperative nausea and vomiting)     Postlaminectomy syndrome of lumbar region        Past Surgical History:  Past Surgical History:   Procedure Laterality Date    BACK SURGERY Right 2015    Right L4/5 hemilaminectomy, discectomy    BREAST SURGERY Right 06/10/2004    breast biopsy     SECTION       SECTION       SECTION      CHOLECYSTECTOMY      INJECT SACROILIAC JOINT Right 2017    Procedure: INJECT SACROILIAC JOINT;  Right Sacroiliac Joint Injection;  Surgeon: Felix Aponte MD;  Location: [facility] OR    INSERT STIMULATOR DORSAL COLUMN Bilateral 2018    Procedure: INSERT STIMULATOR DORSAL COLUMN;  Bilateral Spinal Cord Stimulator Implant;  Surgeon: Felix Aponte MD;  Location: [facility] OR    INSERT STIMULATOR DORSAL COLUMN TRIAL Bilateral 2018    Procedure: INSERT STIMULATOR DORSAL COLUMN TRIAL;  Insertion Bilateral Dorsal Column Stimulator (Trial)    ;  Surgeon: eFlix Aponte MD;  Location: [facility] OR    REPLACE BATTERY STIMULATOR DORSAL COLUMN N/A 2021    Procedure: Replacement of spinal cord stimulator electrode and placement of spinal cord stimulator generator/battery over buttock, removal of old spinal cord stimulator system;  Surgeon: Nando Cam MD;  [place] [facility] [place]       Medications:  Current Outpatient Medications   Medication Sig Dispense Refill    albuterol (PROAIR HFA/PROVENTIL HFA/VENTOLIN HFA) 108 (90 BASE) MCG/ACT Inhaler Inhale 2 puffs  into the lungs as needed      amphetamine-dextroamphetamine (ADDERALL XR) 30 MG per 24 hr capsule Take 20 mg by mouth every morning. Takes two tablets in the morning equalling 40 mg a day.      Calcium-Magnesium-Zinc 333-133-5 MG TABS per tablet Take 1 tablet by mouth every morning       cetirizine (ZYRTEC) 10 MG tablet Take 10 mg by mouth every morning       chlorhexidine (HIBICLENS) 4 % liquid Wash night before and morning of surgery, per clinic instructions 118 mL 0    cholecalciferol (VITAMIN D3) 1000 UNIT tablet Take 1,000 Units by mouth every morning       CHROMIUM PO Take 1 tablet by mouth as needed       cyclobenzaprine (FLEXERIL) 10 MG tablet TAKE 1 TABLET(10 MG) BY MOUTH EVERY NIGHT AS NEEDED FOR MUSCLE SPASMS 30 tablet 3    dextromethorphan-guaiFENesin (MUCINEX DM)  MG 12 hr tablet Take 1 tablet by mouth every morning      lidocaine (XYLOCAINE) 5 % ointment Apply 1 g topically 4 times daily as needed for moderate pain 50 g 1    metFORMIN (GLUCOPHAGE) 500 MG tablet Take 500 mg by mouth      Multiple Vitamins-Minerals (QC WOMENS DAILY MULTIVITAMIN) TABS Take 1 tablet by mouth every morning       pseudoePHEDrine (SUDAFED) 30 MG tablet Take 30 mg by mouth every 4 hours as needed for congestion. Gets OTC, doesn't know exact dose      traMADol (ULTRAM) 50 MG tablet Take 1 tablet (50 mg) by mouth daily as needed for severe pain 15 tablet 0    fluticasone (FLONASE) 50 MCG/ACT nasal spray 2 times daily            Medications related to Pain Management:   Medications related to Pain Management (From now, onward)      None            Allergies:       Allergies   Allergen Reactions    Sulfa Antibiotics Hives    Amitriptyline      Other reaction(s): Other, see comments  Hair loss    Lyrica [Pregabalin]      Other reaction(s): Edema,generalized  Other reaction(s): Edema    Nortriptyline      Other reaction(s): Alopecia  Other reaction(s): Other, see comments  Hairloss, increased blood pressure per patient     Amoxicillin Rash     And itching     Clindamycin Rash     Airway impermeant     Gabapentin Rash       Social History:    Social History     Socioeconomic History    Marital status:      Spouse name: Not on file    Number of children: Not on file    Years of education: Not on file    Highest education level: Not on file   Occupational History    Not on file   Tobacco Use    Smoking status: Every Day     Current packs/day: 0.50     Average packs/day: 0.5 packs/day for 34.2 years (17.1 ttl pk-yrs)     Types: Cigarettes     Start date: 10/30/1990    Smokeless tobacco: Never   Substance and Sexual Activity    Alcohol use: Yes     Alcohol/week: 2.0 standard drinks of alcohol     Types: 2 Cans of beer per week     Comment: WEEKLY    Drug use: No    Sexual activity: Yes     Partners: Male   Other Topics Concern    Not on file   Social History Narrative    Not on file     Social Drivers of Health     Financial Resource Strain: High Risk (12/23/2021)    Received from [facility]    Financial Resource Strain     Difficulty of Paying Living Expenses: Not on file     Difficulty of Paying Living Expenses: Not on file   Food Insecurity: Not on file   Transportation Needs: Not on file   Physical Activity: Not on file   Stress: Not on file   Social Connections: Unknown (12/23/2021)    Received from [facility]    Social Connections     Frequency of Communication with Friends and Family: Not on file   Interpersonal Safety: Not on file   Housing Stability: Not on file     Social History     Social History Narrative    Not on file         Family history:  Family History   Problem Relation Age of Onset    Aortic aneurysm Mother     Diabetes Mother     Heart Disease Mother     Hypertension Mother     Lung Cancer Mother     Hypertension Father     Lung Cancer Father     Diabetes Sister     Sleep Apnea Sister     Diabetes Maternal Grandmother     Dementia Maternal  Grandfather     Breast Cancer Maternal Aunt     Diabetes Maternal Aunt     Dementia Maternal Uncle     Diabetes Paternal Uncle     Breast Cancer Paternal Aunt     Pancreatic Cancer Paternal Aunt     Breast Cancer Maternal Cousin     Breast Cancer Maternal Cousin     Seizure Disorder Son          Physical Exam:  Vitals:    01/02/25 0907   BP: (!) 146/92   BP Location: Right arm   Patient Position: Sitting   Cuff Size: Adult Large   Pulse: 84   SpO2: 97%       General: Awake in no apparent distress.   Eyes: Sclerae are anicteric. PERRLA, EOMI   Neck: supple, no masses.   Lungs: unlabored.   Heart: regular rate and rhythm   Abdomen: soft non tender.  Extremities: Pulses are well palpable, no peripheral edema.   Musculoskeletal: All muscle groups are normal in bulk and tone. The patient changes position without pain behavior. The patient walks with a normal gait. Posture is normal. Muscle strength was rated at 5/5 in all groups in the extremities. Examination of the joints reveals preserved range of motion..   Neurologic exam: Sensation to light touch intact throughout all dermatomes bilateral upper extremities and lower extremities  Psychiatric; Normal affect.   Skin: Warm and Dry.       LABORATORY VALUES:   Recent Labs   Lab Test 04/14/21  1020      POTASSIUM 4.2   CHLORIDE 104   CO2 30   ANIONGAP 2*   GLC 86   BUN 11   CR 0.85   SUSAN 9.0       CBC RESULTS:   Recent Labs   Lab Test 04/14/21  1020   WBC 7.9   RBC 4.48   HGB 14.7   HCT 42.8   MCV 96   MCH 32.8   MCHC 34.3   RDW 12.4          Most Recent 3 INR's:  Recent Labs   Lab Test 04/14/21  1020   INR 0.90         No images are attached to the encounter.     ASSESSMENT/PLAN:                             ASSESSMENT:       Diagnoses         Codes Comments    Postlaminectomy syndrome of lumbar region    -  Primary M96.1     S/P insertion of spinal cord stimulator     Z96.89     Lumbar radiculopathy     M54.16     Discogenic low back pain     M51.360      Encounter related to worker's compensation claim     Z02.6              PLAN:    - Medications.     Flexeril 10 mg twice daily as needed  Tramadol 50 mg 4 times daily as needed        - Interventional procedures:  Reprogram the spinal cord stimulator done today with increasing the frequency    - Labs and imaging: Thoracic spine x-ray ordered to evaluate migration of the spinal cord stimulator    - Rehab: The patient is also encouraged to stay active as tolerated.     - Psychology: No current needs.    - Integrated medicine: We will refer the patient for acupuncture therapy.    - Disposition: Referral to Dr. Hargrove for opinion about surgical intervention      Assessment will be ongoing with changes in treatment as indicated.  Benefits/risks/alternatives to treatment have been reviewed and the patient has been instructed to contact this office if they have any questions or concerns.  This plan of care has been discussed with the patient and the patient is in agreement.     Nando Cam MD, PHD

## 2025-01-02 NOTE — LETTER
1/2/2025       RE: Phyllis Vela  [address]  [facility] [address]     Dear Colleague,    Thank you for referring your patient, Phyllis Vela, to the [facility] [place] at [facility]. Please see a copy of my visit note below.    [facility] : Consultation Note    Patient: Phyllis Vela Age: 54 year old   MRN: [id number] Referred by:  No ref. provider found     Date of Visit: January 2, 2025    Reason for consultation:    Phyllis Vela is a 54 year old female who is seen in consultation today at the request of her provider,Dr. Albarado ref. provider found for a comprehensive evaluation and management of pain.  Primary Care Provider is Sonya Beasley      Chief complaints:    Chief Complaint   Patient presents with     RECHECK         History of Present illness:       Phyllis Vela is a 54-year-old female with a significant history of chronic lower back pain secondary to postlaminectomy pain syndrome, managed with a spinal cord stimulator. She reports a recent exacerbation of her pain, which prompted her to receive an epidural steroid injection at [facility]. Her lumbar spine surgery history includes a right L4-5 discectomy performed by Dr. Mai on May 8, 2015. Since then, she has had two spinal cord stimulators placed: the first in 2018 by Dr. Vallecillo at [facility] pain clinic, followed by a replacement in 2021 by me. She reports that the current stimulator is still functioning, but unfortunately, it has not been effective in managing all of her symptoms following a work-related injury on April 25, 2024.    Phyllis has also undergone 18 weeks of physical therapy, which she found beneficial; however, her therapist has indicated that there is little more they can do for her at this point. She has received two injections: one in June 2024 for a  right L3-4 transforaminal, which is wearing off, and a left L5-S1 transforaminal injection on September 19, 2024, which provided good relief. Both injections were administered at [facility].     Her MRI of the lumbar spine show degeneration of the discs at the L4-5 and L5-S1 levels. At the L5-S1 level, there is anterior pressure on the exiting 5th nerves bilaterally, with the right side being more affected, consistent with her clinical presentation.     Phyllis reports that her symptoms have been worsening overall. She now experiences right-sided L5 distribution pain, along with numbness and tingling in both thighs. She notes that previously, changing positions or sleeping in a recliner provided relief, but this is no longer effective. She also experiences increased tingling in her bilateral lower extremities when lying down, requiring her to shift positions frequently for comfort. In addition to her spinal cord stimulator, she has tried dry needling in the past and is currently using Flexeril as needed for her discomfort.    Today, Phyllis presented for spinal cord stimulator reprogramming and additional imaging to rule out lead migration. She also requested refills for her medications, Flexeril and tramadol. Additionally, she wanted to get a second opinion from an orthopedic surgeon regarding possible surgical options for her L5 radiculopathy symptoms, which are new and not currently addressed by her spinal cord stimulator.      [place] Prescription Monitoring Program:   Reviewed. No concerns    Review of Systems:  ROS    Patient Supplied Answers To the  Pain Questionnaire      2/19/2019    11:35 AM    Pain -  Patient Entered Questionnaire/Answers   What number best describes your pain right now:  0 = No pain  to  10 = Worst pain imaginable 4                5/21/2018     7:31 AM 7/10/2018     1:03 PM 8/28/2018     9:07 AM   JESÚS-7 SCORE   Total Score 1 (minimal anxiety) 0 (minimal anxiety) 1 (minimal  anxiety)   Total Score 1 0 1            2025     9:05 AM 2024     6:49 AM   PHQ-2 (  [employer])   Q1: Little interest or pleasure in doing things 0 0   Q2: Feeling down, depressed or hopeless 0 0   PHQ-2 Score 0 0             No data to display                   Past Medical History:  Past Medical History:   Diagnosis Date     ADD (attention deficit disorder) 2013     ADHD (attention deficit hyperactivity disorder)      Chronic back pain 2012     DDD (degenerative disc disease), lumbar 2011     Depressive disorder      Diabetes (H)     border line     Generalized anxiety disorder 2011     Head injury     concussion as a child     Hyperglycemia 2010     Hyperglyceridemia 2010     Insomnia 10/31/2013     Lumbar stenosis 2011     Other chronic pain      PONV (postoperative nausea and vomiting)      Postlaminectomy syndrome of lumbar region        Past Surgical History:  Past Surgical History:   Procedure Laterality Date     BACK SURGERY Right 2015    Right L4/5 hemilaminectomy, discectomy     BREAST SURGERY Right 06/10/2004    breast biopsy      SECTION        SECTION        SECTION       CHOLECYSTECTOMY       INJECT SACROILIAC JOINT Right 2017    Procedure: INJECT SACROILIAC JOINT;  Right Sacroiliac Joint Injection;  Surgeon: Felix Aponte MD;  Location: [facility] OR     INSERT STIMULATOR DORSAL COLUMN Bilateral 2018    Procedure: INSERT STIMULATOR DORSAL COLUMN;  Bilateral Spinal Cord Stimulator Implant;  Surgeon: Felix Aponte MD;  Location: [facility] OR     INSERT STIMULATOR DORSAL COLUMN TRIAL Bilateral 2018    Procedure: INSERT STIMULATOR DORSAL COLUMN TRIAL;  Insertion Bilateral Dorsal Column Stimulator (Trial)    ;  Surgeon: Felix Aponte MD;  Location: [facility] OR     REPLACE BATTERY STIMULATOR DORSAL COLUMN N/A 2021    Procedure: Replacement of spinal cord  stimulator electrode and placement of spinal cord stimulator generator/battery over buttock, removal of old spinal cord stimulator system;  Surgeon: Nando Cam MD;  Location: [facility] OR       Medications:  Current Outpatient Medications   Medication Sig Dispense Refill     albuterol (PROAIR HFA/PROVENTIL HFA/VENTOLIN HFA) 108 (90 BASE) MCG/ACT Inhaler Inhale 2 puffs into the lungs as needed       amphetamine-dextroamphetamine (ADDERALL XR) 30 MG per 24 hr capsule Take 20 mg by mouth every morning. Takes two tablets in the morning equalling 40 mg a day.       Calcium-Magnesium-Zinc 333-133-5 MG TABS per tablet Take 1 tablet by mouth every morning        cetirizine (ZYRTEC) 10 MG tablet Take 10 mg by mouth every morning        chlorhexidine (HIBICLENS) 4 % liquid Wash night before and morning of surgery, per clinic instructions 118 mL 0     cholecalciferol (VITAMIN D3) 1000 UNIT tablet Take 1,000 Units by mouth every morning        CHROMIUM PO Take 1 tablet by mouth as needed        cyclobenzaprine (FLEXERIL) 10 MG tablet TAKE 1 TABLET(10 MG) BY MOUTH EVERY NIGHT AS NEEDED FOR MUSCLE SPASMS 30 tablet 3     dextromethorphan-guaiFENesin (MUCINEX DM)  MG 12 hr tablet Take 1 tablet by mouth every morning       lidocaine (XYLOCAINE) 5 % ointment Apply 1 g topically 4 times daily as needed for moderate pain 50 g 1     metFORMIN (GLUCOPHAGE) 500 MG tablet Take 500 mg by mouth       Multiple Vitamins-Minerals (QC WOMENS DAILY MULTIVITAMIN) TABS Take 1 tablet by mouth every morning        pseudoePHEDrine (SUDAFED) 30 MG tablet Take 30 mg by mouth every 4 hours as needed for congestion. Gets OTC, doesn't know exact dose       traMADol (ULTRAM) 50 MG tablet Take 1 tablet (50 mg) by mouth daily as needed for severe pain 15 tablet 0     fluticasone (FLONASE) 50 MCG/ACT nasal spray 2 times daily            Medications related to Pain Management:   Medications related to Pain Management (From now, onward)      None             Allergies:       Allergies   Allergen Reactions     Sulfa Antibiotics Hives     Amitriptyline      Other reaction(s): Other, see comments  Hair loss     Lyrica [Pregabalin]      Other reaction(s): Edema,generalized  Other reaction(s): Edema     Nortriptyline      Other reaction(s): Alopecia  Other reaction(s): Other, see comments  Hairloss, increased blood pressure per patient     Amoxicillin Rash     And itching      Clindamycin Rash     Airway impermeant      Gabapentin Rash       Social History:    Social History     Socioeconomic History     Marital status:      Spouse name: Not on file     Number of children: Not on file     Years of education: Not on file     Highest education level: Not on file   Occupational History     Not on file   Tobacco Use     Smoking status: Every Day     Current packs/day: 0.50     Average packs/day: 0.5 packs/day for 34.2 years (17.1 ttl pk-yrs)     Types: Cigarettes     Start date: 10/30/1990     Smokeless tobacco: Never   Substance and Sexual Activity     Alcohol use: Yes     Alcohol/week: 2.0 standard drinks of alcohol     Types: 2 Cans of beer per week     Comment: WEEKLY     Drug use: No     Sexual activity: Yes     Partners: Male   Other Topics Concern     Not on file   Social History Narrative     Not on file     Social Drivers of Health     Financial Resource Strain: High Risk (12/23/2021)    Received from [employer]    Financial Resource Strain      Difficulty of Paying Living Expenses: Not on file      Difficulty of Paying Living Expenses: Not on file   Food Insecurity: Not on file   Transportation Needs: Not on file   Physical Activity: Not on file   Stress: Not on file   Social Connections: Unknown (12/23/2021)    Received from [employer]    Social Connections      Frequency of Communication with Friends and Family: Not on file   Interpersonal Safety: Not on file   Housing Stability: Not on  file     Social History     Social History Narrative     Not on file         Family history:  Family History   Problem Relation Age of Onset     Aortic aneurysm Mother      Diabetes Mother      Heart Disease Mother      Hypertension Mother      Lung Cancer Mother      Hypertension Father      Lung Cancer Father      Diabetes Sister      Sleep Apnea Sister      Diabetes Maternal Grandmother      Dementia Maternal Grandfather      Breast Cancer Maternal Aunt      Diabetes Maternal Aunt      Dementia Maternal Uncle      Diabetes Paternal Uncle      Breast Cancer Paternal Aunt      Pancreatic Cancer Paternal Aunt      Breast Cancer Maternal Cousin      Breast Cancer Maternal Cousin      Seizure Disorder Son          Physical Exam:  Vitals:    01/02/25 0907   BP: (!) 146/92   BP Location: Right arm   Patient Position: Sitting   Cuff Size: Adult Large   Pulse: 84   SpO2: 97%       General: Awake in no apparent distress.   Eyes: Sclerae are anicteric. PERRLA, EOMI   Neck: supple, no masses.   Lungs: unlabored.   Heart: regular rate and rhythm   Abdomen: soft non tender.  Extremities: Pulses are well palpable, no peripheral edema.   Musculoskeletal: All muscle groups are normal in bulk and tone. The patient changes position without pain behavior. The patient walks with a normal gait. Posture is normal. Muscle strength was rated at 5/5 in all groups in the extremities. Examination of the joints reveals preserved range of motion..   Neurologic exam: Sensation to light touch intact throughout all dermatomes bilateral upper extremities and lower extremities  Psychiatric; Normal affect.   Skin: Warm and Dry.       LABORATORY VALUES:   Recent Labs   Lab Test 04/14/21  1020      POTASSIUM 4.2   CHLORIDE 104   CO2 30   ANIONGAP 2*   GLC 86   BUN 11   CR 0.85   SUSAN 9.0       CBC RESULTS:   Recent Labs   Lab Test 04/14/21  1020   WBC 7.9   RBC 4.48   HGB 14.7   HCT 42.8   MCV 96   MCH 32.8   MCHC 34.3   RDW 12.4           Most Recent 3 INR's:  Recent Labs   Lab Test 04/14/21  1020   INR 0.90         No images are attached to the encounter.     ASSESSMENT/PLAN:                             ASSESSMENT:       Diagnoses         Codes Comments    Postlaminectomy syndrome of lumbar region    -  Primary M96.1     S/P insertion of spinal cord stimulator     Z96.89     Lumbar radiculopathy     M54.16     Discogenic low back pain     M51.360     Encounter related to worker's compensation claim     Z02.6              PLAN:    - Medications.     Flexeril 10 mg twice daily as needed  Tramadol 50 mg 4 times daily as needed        - Interventional procedures:  Reprogram the spinal cord stimulator done today with increasing the frequency    - Labs and imaging: Thoracic spine x-ray ordered to evaluate migration of the spinal cord stimulator    - Rehab: The patient is also encouraged to stay active as tolerated.     - Psychology: No current needs.    - Integrated medicine: We will refer the patient for acupuncture therapy.    - Disposition: Referral to Dr. Hargrove for opinion about surgical intervention      Assessment will be ongoing with changes in treatment as indicated.  Benefits/risks/alternatives to treatment have been reviewed and the patient has been instructed to contact this office if they have any questions or concerns.  This plan of care has been discussed with the patient and the patient is in agreement.     Nando Cam MD, PHD      Again, thank you for allowing me to participate in the care of your patient.      Sincerely,    Nando Cam MD

## 2025-01-02 NOTE — PATIENT INSTRUCTIONS
Medications:    cyclobenzaprine (FLEXERIL) 10 MG tablet. TAKE 1 TABLET(10 MG) BY MOUTH EVERY NIGHT AS NEEDED FOR MUSCLE SPASMS.      traMADol (ULTRAM) 50 MG tablet.  Take 1 tablet (50 mg) by mouth daily as needed for severe pain.      *Please provide the clinic with a minium of 1 week notice, on all prescription refills.       Referrals:    Orthopedics Referral placed.      Imaging:    Thoracic Spine Xray completed.    IMAGING SERVICES HOURS:    All imaging modalities are available from 7 a.m. - 9 p.m. Monday through Friday  X-ray, CT, MRI, and General Ultrasound appointments are available from 7 a.m. -3:30 p.m. on Saturdays  X-ray, CT and MRI appointments are available from 8 a.m. - 4:30 p.m. on Sundays  Please call [phone] to schedule imaging exams       Treatment planning:    SCS reprogrammed with [employer] today.      Recommended Follow up:      Follow up as needed.      To speak with a nurse, schedule/reschedule/cancel a clinic appointment, or request a medication refill call: [phone]    You can also reach us by [employer]: [email or web address]

## 2025-01-13 ENCOUNTER — TELEPHONE (OUTPATIENT)
Dept: NEUROSURGERY | Facility: CLINIC | Age: 55
End: 2025-01-13
Payer: COMMERCIAL

## 2025-01-13 NOTE — TELEPHONE ENCOUNTER
[facility] Call Center    Phone Message    May a detailed message be left on voicemail: yes     Reason for Call: Other: Anais pt's [facility] called to schedule an appt based off of Neurosurgery referral. Per triage notes pt was referred to Dr Hogan in Ortho and has an appt with him already. Anais stated that patient was referred to either Dr Hogan or Dr Chairez and pt would rather be seen in Neurosurgery as she has already seen an ortho spine provider and would like a neurosurgery opinion. Writer let Anais know that Dr Chairez is not accepting new pts at this time and referral would need to be reviewed. Anais understood and asked for a call back once triage was completed to schedule and stated pt [place] [facility] [place]. Please call Anais to scheduled when ready. [phone]      Action Taken: Message routed to:  [facility] ([facility] Neurosurgery    Travel Screening: Not Applicable     Date of Service:

## 2025-01-15 DIAGNOSIS — M96.1 POSTLAMINECTOMY SYNDROME OF LUMBAR REGION: Primary | ICD-10-CM

## 2025-01-15 NOTE — TELEPHONE ENCOUNTER
See phone message from [facility].  I called back & spoke to [facility] & relayed reply from Neurosurgery staff that  does not do work comp pts so I advised pt keep NEW appt scheduled with  on 1-30-25 & she agreed & stated she will notify the pt & has our ph# to give to pt.  Call back prn.    Whit Monge RN,.

## 2025-01-23 NOTE — TELEPHONE ENCOUNTER
Action 25 MV 4.21pm   Action Taken Imaging request faxed to [facility] and [facility]        SPINE PATIENTS - NEW PROTOCOL PREVISIT    RECORDS RECEIVED FROM: internal   REASON FOR VISIT: SCS, postlaminectomy pain syndrome. SCS doesn't cover L5 radiculopathy symptom    PROVIDER: Dr. Cardoso   DATE OF APPT: 25   NOTES (FOR ALL VISITS) STATUS DETAILS   OFFICE NOTE from referring provider Internal Dr Nando Cam @ [facility] Pain:  25 mychart encounter  25  (Additional encounters)   OFFICE NOTE from other specialist Received/CE Dr Annette Corona @ [facility]:  24    Bertin HILLS @ [facility]:  24    Dr Felix Floyd @ [facility] Neurosurg:  10/30/17    Dr Viral Arias @  Neurosur17   OPERATIVE REPORT Internal/CE [facility]:  21  Replacement of spinal cord stimulator electrode and placement of spinal cord stimulator generator/battery over buttock, removal of old spinal cord stimulator system     [facility]:  18  Bilateral Spinal Cord Stimulator Implant     [facility]:  18  Insertion Bilateral Dorsal Column Stimulator (Trial)     [facility]:  5/8/15  MINIMALLY INVASIVE DISCECTOMY, RIGHT L4-5 ** PRONE POSITION **    MEDICATION LIST Internal    IMAGING  (FOR ALL VISITS)     MRI (HEAD, NECK, SPINE) In process [facility]:  MRI Lumbar Spine 24   XRAY (SPINE) *NEUROSURGERY* In process [facility]:  XR Thoracic Spine 25  XR Lumbar Spine 22  XR Thoracic Spine 22  XR Thoracic Lumbar 10/31/19  (Additional encounters)    [facility]:  MRI Lumbar Spine 16  MRI Lumbar Spine 10/9/15  MRI Lumbar Spine 6/6/15  MRI Lumbar Spine 3/16/16   CT (HEAD, NECK, SPINE) In process [facility]:  CT Lumbar Spine 16

## 2025-02-06 ENCOUNTER — TELEPHONE (OUTPATIENT)
Dept: NEUROSURGERY | Facility: CLINIC | Age: 55
End: 2025-02-06
Payer: COMMERCIAL

## 2025-02-06 DIAGNOSIS — M96.1 POSTLAMINECTOMY SYNDROME OF LUMBAR REGION: Primary | ICD-10-CM

## 2025-02-06 NOTE — CONFIDENTIAL NOTE
EOS ordered for patient. Message sent to scheduling for EOS prior to Dr. Cardoso appointment.     Meredith Hilario LPN   Neurosurgery

## 2025-02-11 ENCOUNTER — ANCILLARY PROCEDURE (OUTPATIENT)
Dept: GENERAL RADIOLOGY | Facility: CLINIC | Age: 55
End: 2025-02-11
Attending: NEUROLOGICAL SURGERY
Payer: COMMERCIAL

## 2025-02-11 ENCOUNTER — OFFICE VISIT (OUTPATIENT)
Dept: NEUROSURGERY | Facility: CLINIC | Age: 55
End: 2025-02-11
Attending: ANESTHESIOLOGY
Payer: OTHER MISCELLANEOUS

## 2025-02-11 ENCOUNTER — PRE VISIT (OUTPATIENT)
Dept: NEUROSURGERY | Facility: CLINIC | Age: 55
End: 2025-02-11
Payer: COMMERCIAL

## 2025-02-11 VITALS
HEART RATE: 83 BPM | OXYGEN SATURATION: 99 % | DIASTOLIC BLOOD PRESSURE: 90 MMHG | RESPIRATION RATE: 16 BRPM | SYSTOLIC BLOOD PRESSURE: 136 MMHG

## 2025-02-11 DIAGNOSIS — M96.1 POSTLAMINECTOMY SYNDROME OF LUMBAR REGION: ICD-10-CM

## 2025-02-11 DIAGNOSIS — M54.16 LUMBAR RADICULOPATHY: ICD-10-CM

## 2025-02-11 DIAGNOSIS — Z02.6 ENCOUNTER RELATED TO WORKER'S COMPENSATION CLAIM: ICD-10-CM

## 2025-02-11 DIAGNOSIS — M51.360 DISCOGENIC LOW BACK PAIN: ICD-10-CM

## 2025-02-11 DIAGNOSIS — Z96.89 S/P INSERTION OF SPINAL CORD STIMULATOR: ICD-10-CM

## 2025-02-11 PROCEDURE — 77073 BONE LENGTH STUDIES: CPT | Performed by: STUDENT IN AN ORGANIZED HEALTH CARE EDUCATION/TRAINING PROGRAM

## 2025-02-11 PROCEDURE — 72082 X-RAY EXAM ENTIRE SPI 2/3 VW: CPT | Performed by: STUDENT IN AN ORGANIZED HEALTH CARE EDUCATION/TRAINING PROGRAM

## 2025-02-11 ASSESSMENT — PAIN SCALES - GENERAL: PAINLEVEL_OUTOF10: MODERATE PAIN (4)

## 2025-02-11 NOTE — LETTER
2/11/2025       RE: Phyllis Vela  [address] [place] [facility] [address]     Dear Colleague,    Thank you for referring your patient, Phyllis Vela, to the [facility] [place] at [facility]. Please see a copy of my visit note below.    2/11/2025  Neurosurgery Clinic Visit    Chief Complaint: Right leg pain vs numbness    History of present illness:  Phyllis Vela is a 54 year old y/o female presenting with an extensive PMHx of chronic back pain, following with chronic pain clinic for multiple years, s/p right L4, 5 microdiscectomy, hemilaminectomy (2015), s/p two spinal cord stimulators for lumbar post-laminectomy syndrome by Dr. Aponte (2018, 2021), presenting today for evaluation of right L5 decompression. She is here to get a second opinion regarding if she would benefit from a surgical intervention for her imaging finding reported as anterior pressure on L5 nerves.      She met with Dr. Ribera in 2024 who advised that there was not a successful surgical intervention to target anterior decompression of the L5 exiting nerves. Was recommended at that time to consider reprogramming of spinal cord stimulator versus adjustment of lead placement to better target L5 distribution.     She reports numbness, tingling in both thighs, as well as pain down her right leg and in lower back. Predominant complaint today is numbness. Reports that the symptoms she feels are positional, and triggered by things like laying flat; semi-reclined is best position, and if she lies in bed, she needs her knees propped up. She denies shooting or electric pain.    Physical exam:   There were no vitals taken for this visit.  General: Awake and alert and in no acute distress.  Pulm: Breathing comfortably on room air  CN: Symmetric browlift, smile, tongue protrusion, palate elevation, and sternocleidomastoids. No dysarthria. Extraocular muscles are all intact.  Pupils react bilaterally and equally  Motor: No pronator drift. Good muscle bulk throughout. 5 out of 5 strength in bilateral upper and lower extremities   Sensation: Endorses tingling/numbness in right buttock area, right lateral leg down the posterolateral aspect and around the ankle, and bottoms of both feet.  Reflexes: 2+ reflexes bilateral biceps, brachioradialis, and patellar tendons. Pérez's reflex negative. Babinski reflex negative. Bilateral clonus negative.         Not shooting electric pain anymore  Most concerning symptom is numbness    Imaging:  MRI 5/21/2024: Disc degeneration at L4-5 and L5-S1.     Assessment:  # Right leg pain  We completely agrees with the initial opinion. Dr. Ribera's note mentions anterior decompression of L5 exiting nerves, which we were unable to appreciate on our review of imaging. A colleague has suggested referral to [facility] for consideration of Intracept, which we don't offer here, but we are happy to place a referral so that she may learn if she is a candidate or if they have alternative ideas for addressing her symptoms.    Plan:  - [facility] referral for consideration of Intracept  - Follow up PRN    Patient seen and discussed with Dr. Walker MD.    Billie Krishnamurthy MD on 2/11/2025 at 8:59 AM  PGY-1, Department of Neurosurgery  [facility]                REVIEWED ASSOCIATED CLINICAL DATA AND HISTORY FOUND IN THE MEDICAL RECORD:  Past Medical History:   Past Medical History:   Diagnosis Date     ADD (attention deficit disorder) 03/07/2013     ADHD (attention deficit hyperactivity disorder)      Chronic back pain 02/07/2012     DDD (degenerative disc disease), lumbar 01/28/2011     Depressive disorder      Diabetes (H)     border line     Generalized anxiety disorder 02/17/2011     Head injury     concussion as a child     Hyperglycemia 09/08/2010     Hyperglyceridemia 09/20/2010     Insomnia 10/31/2013     Lumbar stenosis 01/28/2011     Other  chronic pain      PONV (postoperative nausea and vomiting)      Postlaminectomy syndrome of lumbar region        Surgical History:   Past Surgical History:   Procedure Laterality Date     BACK SURGERY Right 2015    Right L4/5 hemilaminectomy, discectomy     BREAST SURGERY Right 06/10/2004    breast biopsy      SECTION        SECTION        SECTION       CHOLECYSTECTOMY       INJECT SACROILIAC JOINT Right 2017    Procedure: INJECT SACROILIAC JOINT;  Right Sacroiliac Joint Injection;  Surgeon: Felix Aponte MD;  Location: [facility] OR     INSERT STIMULATOR DORSAL COLUMN Bilateral 2018    Procedure: INSERT STIMULATOR DORSAL COLUMN;  Bilateral Spinal Cord Stimulator Implant;  Surgeon: Felix Aponte MD;  Location: [facility] OR     INSERT STIMULATOR DORSAL COLUMN TRIAL Bilateral 2018    Procedure: INSERT STIMULATOR DORSAL COLUMN TRIAL;  Insertion Bilateral Dorsal Column Stimulator (Trial)    ;  Surgeon: Felix Aponte MD;  Location: [facility] OR     REPLACE BATTERY STIMULATOR DORSAL COLUMN N/A 2021    Procedure: Replacement of spinal cord stimulator electrode and placement of spinal cord stimulator generator/battery over buttock, removal of old spinal cord stimulator system;  Surgeon: Nando Cam MD;  Location: [facility] OR       Social history:   Social History     Tobacco Use     Smoking status: Every Day     Current packs/day: 0.50     Average packs/day: 0.5 packs/day for 34.3 years (17.1 ttl pk-yrs)     Types: Cigarettes     Start date: 10/30/1990     Smokeless tobacco: Never   Substance Use Topics     Alcohol use: Yes     Alcohol/week: 2.0 standard drinks of alcohol     Types: 2 Cans of beer per week     Comment: WEEKLY     Drug use: No       Family history:   Family History   Problem Relation Age of Onset     Aortic aneurysm Mother      Diabetes Mother      Heart Disease Mother      Hypertension Mother      Lung Cancer  Mother      Hypertension Father      Lung Cancer Father      Diabetes Sister      Sleep Apnea Sister      Diabetes Maternal Grandmother      Dementia Maternal Grandfather      Breast Cancer Maternal Aunt      Diabetes Maternal Aunt      Dementia Maternal Uncle      Diabetes Paternal Uncle      Breast Cancer Paternal Aunt      Pancreatic Cancer Paternal Aunt      Breast Cancer Maternal Cousin      Breast Cancer Maternal Cousin      Seizure Disorder Son        Medications:  Current Outpatient Medications   Medication Sig Dispense Refill     albuterol (PROAIR HFA/PROVENTIL HFA/VENTOLIN HFA) 108 (90 BASE) MCG/ACT Inhaler Inhale 2 puffs into the lungs as needed       amphetamine-dextroamphetamine (ADDERALL XR) 30 MG per 24 hr capsule Take 20 mg by mouth every morning. Takes two tablets in the morning equalling 40 mg a day.       Calcium-Magnesium-Zinc 333-133-5 MG TABS per tablet Take 1 tablet by mouth every morning        cetirizine (ZYRTEC) 10 MG tablet Take 10 mg by mouth every morning        chlorhexidine (HIBICLENS) 4 % liquid Wash night before and morning of surgery, per clinic instructions 118 mL 0     cholecalciferol (VITAMIN D3) 1000 UNIT tablet Take 1,000 Units by mouth every morning        CHROMIUM PO Take 1 tablet by mouth as needed        cyclobenzaprine (FLEXERIL) 10 MG tablet TAKE 1 TABLET(10 MG) BY MOUTH EVERY NIGHT AS NEEDED FOR MUSCLE SPASMS 60 tablet 3     dextromethorphan-guaiFENesin (MUCINEX DM)  MG 12 hr tablet Take 1 tablet by mouth every morning       fluticasone (FLONASE) 50 MCG/ACT nasal spray 2 times daily        lidocaine (XYLOCAINE) 5 % ointment Apply 1 g topically 4 times daily as needed for moderate pain 50 g 1     metFORMIN (GLUCOPHAGE) 500 MG tablet Take 500 mg by mouth       Multiple Vitamins-Minerals (QC WOMENS DAILY MULTIVITAMIN) TABS Take 1 tablet by mouth every morning        pseudoePHEDrine (SUDAFED) 30 MG tablet Take 30 mg by mouth every 4 hours as needed for congestion. Gets  OTC, doesn't know exact dose       traMADol (ULTRAM) 50 MG tablet Take 1 tablet (50 mg) by mouth daily as needed for severe pain. 15 tablet 0     No current facility-administered medications for this visit.       Allergies:     Allergies   Allergen Reactions     Sulfa Antibiotics Hives     Amitriptyline      Other reaction(s): Other, see comments  Hair loss     Lyrica [Pregabalin]      Other reaction(s): Edema,generalized  Other reaction(s): Edema     Nortriptyline      Other reaction(s): Alopecia  Other reaction(s): Other, see comments  Hairloss, increased blood pressure per patient     Amoxicillin Rash     And itching      Clindamycin Rash     Airway impermeant      Gabapentin Rash     I have seen this patient with the resident and formulated a plan and agree with this note.  AMP      Again, thank you for allowing me to participate in the care of your patient.      Sincerely,    Tania Cardoso MD

## 2025-02-11 NOTE — PROGRESS NOTES
2/11/2025  Neurosurgery Clinic Visit    Chief Complaint: Right leg pain vs numbness    History of present illness:  Phyllis Vela is a 54 year old y/o female presenting with an extensive PMHx of chronic back pain, following with chronic pain clinic for multiple years, s/p right L4, 5 microdiscectomy, hemilaminectomy (2015), s/p two spinal cord stimulators for lumbar post-laminectomy syndrome by Dr. Aponte (2018, 2021), presenting today for evaluation of right L5 decompression. She is here to get a second opinion regarding if she would benefit from a surgical intervention for her imaging finding reported as anterior pressure on L5 nerves.      She met with Dr. Ribera in 2024 who advised that there was not a successful surgical intervention to target anterior decompression of the L5 exiting nerves. Was recommended at that time to consider reprogramming of spinal cord stimulator versus adjustment of lead placement to better target L5 distribution.     She reports numbness, tingling in both thighs, as well as pain down her right leg and in lower back. Predominant complaint today is numbness. Reports that the symptoms she feels are positional, and triggered by things like laying flat; semi-reclined is best position, and if she lies in bed, she needs her knees propped up. She denies shooting or electric pain.    Physical exam:   There were no vitals taken for this visit.  General: Awake and alert and in no acute distress.  Pulm: Breathing comfortably on room air  CN: Symmetric browlift, smile, tongue protrusion, palate elevation, and sternocleidomastoids. No dysarthria. Extraocular muscles are all intact. Pupils react bilaterally and equally  Motor: No pronator drift. Good muscle bulk throughout. 5 out of 5 strength in bilateral upper and lower extremities   Sensation: Endorses tingling/numbness in right buttock area, right lateral leg down the posterolateral aspect and around the ankle, and bottoms of both  feet.  Reflexes: 2+ reflexes bilateral biceps, brachioradialis, and patellar tendons. Pérez's reflex negative. Babinski reflex negative. Bilateral clonus negative.         Not shooting electric pain anymore  Most concerning symptom is numbness    Imaging:  MRI 2024: Disc degeneration at L4-5 and L5-S1.     Assessment:  # Right leg pain  We completely agrees with the initial opinion. Dr. Ribera's note mentions anterior decompression of L5 exiting nerves, which we were unable to appreciate on our review of imaging. A colleague has suggested referral to [facility] for consideration of Intracept, which we don't offer here, but we are happy to place a referral so that she may learn if she is a candidate or if they have alternative ideas for addressing her symptoms.    Plan:  - [facility] referral for consideration of Intracept  - Follow up PRN    Patient seen and discussed with Dr. Walker MD.    Billie Krishnamurthy MD on 2025 at 8:59 AM  PGY-1, Department of Neurosurgery  [facility]                REVIEWED ASSOCIATED CLINICAL DATA AND HISTORY FOUND IN THE MEDICAL RECORD:  Past Medical History:   Past Medical History:   Diagnosis Date    ADD (attention deficit disorder) 2013    ADHD (attention deficit hyperactivity disorder)     Chronic back pain 2012    DDD (degenerative disc disease), lumbar 2011    Depressive disorder     Diabetes (H)     border line    Generalized anxiety disorder 2011    Head injury     concussion as a child    Hyperglycemia 2010    Hyperglyceridemia 2010    Insomnia 10/31/2013    Lumbar stenosis 2011    Other chronic pain     PONV (postoperative nausea and vomiting)     Postlaminectomy syndrome of lumbar region        Surgical History:   Past Surgical History:   Procedure Laterality Date    BACK SURGERY Right 2015    Right L4/5 hemilaminectomy, discectomy    BREAST SURGERY Right 06/10/2004    breast biopsy      SECTION       SECTION       SECTION      CHOLECYSTECTOMY      INJECT SACROILIAC JOINT Right 2017    Procedure: INJECT SACROILIAC JOINT;  Right Sacroiliac Joint Injection;  Surgeon: Felix Aponte MD;  Location: [facility] OR    INSERT STIMULATOR DORSAL COLUMN Bilateral 2018    Procedure: INSERT STIMULATOR DORSAL COLUMN;  Bilateral Spinal Cord Stimulator Implant;  Surgeon: Felix Aponte MD;  Location: [facility] OR    INSERT STIMULATOR DORSAL COLUMN TRIAL Bilateral 2018    Procedure: INSERT STIMULATOR DORSAL COLUMN TRIAL;  Insertion Bilateral Dorsal Column Stimulator (Trial)    ;  Surgeon: Felix Aponte MD;  Location: [facility] OR    REPLACE BATTERY STIMULATOR DORSAL COLUMN N/A 2021    Procedure: Replacement of spinal cord stimulator electrode and placement of spinal cord stimulator generator/battery over buttock, removal of old spinal cord stimulator system;  Surgeon: Nando Cam MD;  Location: [facility] OR       Social history:   Social History     Tobacco Use    Smoking status: Every Day     Current packs/day: 0.50     Average packs/day: 0.5 packs/day for 34.3 years (17.1 ttl pk-yrs)     Types: Cigarettes     Start date: 10/30/1990    Smokeless tobacco: Never   Substance Use Topics    Alcohol use: Yes     Alcohol/week: 2.0 standard drinks of alcohol     Types: 2 Cans of beer per week     Comment: WEEKLY    Drug use: No       Family history:   Family History   Problem Relation Age of Onset    Aortic aneurysm Mother     Diabetes Mother     Heart Disease Mother     Hypertension Mother     Lung Cancer Mother     Hypertension Father     Lung Cancer Father     Diabetes Sister     Sleep Apnea Sister     Diabetes Maternal Grandmother     Dementia Maternal Grandfather     Breast Cancer Maternal Aunt     Diabetes Maternal Aunt     Dementia Maternal Uncle     Diabetes Paternal Uncle     Breast Cancer Paternal Aunt     Pancreatic Cancer Paternal  Aunt     Breast Cancer Maternal Cousin     Breast Cancer Maternal Cousin     Seizure Disorder Son        Medications:  Current Outpatient Medications   Medication Sig Dispense Refill    albuterol (PROAIR HFA/PROVENTIL HFA/VENTOLIN HFA) 108 (90 BASE) MCG/ACT Inhaler Inhale 2 puffs into the lungs as needed      amphetamine-dextroamphetamine (ADDERALL XR) 30 MG per 24 hr capsule Take 20 mg by mouth every morning. Takes two tablets in the morning equalling 40 mg a day.      Calcium-Magnesium-Zinc 333-133-5 MG TABS per tablet Take 1 tablet by mouth every morning       cetirizine (ZYRTEC) 10 MG tablet Take 10 mg by mouth every morning       chlorhexidine (HIBICLENS) 4 % liquid Wash night before and morning of surgery, per clinic instructions 118 mL 0    cholecalciferol (VITAMIN D3) 1000 UNIT tablet Take 1,000 Units by mouth every morning       CHROMIUM PO Take 1 tablet by mouth as needed       cyclobenzaprine (FLEXERIL) 10 MG tablet TAKE 1 TABLET(10 MG) BY MOUTH EVERY NIGHT AS NEEDED FOR MUSCLE SPASMS 60 tablet 3    dextromethorphan-guaiFENesin (MUCINEX DM)  MG 12 hr tablet Take 1 tablet by mouth every morning      fluticasone (FLONASE) 50 MCG/ACT nasal spray 2 times daily       lidocaine (XYLOCAINE) 5 % ointment Apply 1 g topically 4 times daily as needed for moderate pain 50 g 1    metFORMIN (GLUCOPHAGE) 500 MG tablet Take 500 mg by mouth      Multiple Vitamins-Minerals (QC WOMENS DAILY MULTIVITAMIN) TABS Take 1 tablet by mouth every morning       pseudoePHEDrine (SUDAFED) 30 MG tablet Take 30 mg by mouth every 4 hours as needed for congestion. Gets OTC, doesn't know exact dose      traMADol (ULTRAM) 50 MG tablet Take 1 tablet (50 mg) by mouth daily as needed for severe pain. 15 tablet 0     No current facility-administered medications for this visit.       Allergies:     Allergies   Allergen Reactions    Sulfa Antibiotics Hives    Amitriptyline      Other reaction(s): Other, see comments  Hair loss    Lyrica  [Pregabalin]      Other reaction(s): Edema,generalized  Other reaction(s): Edema    Nortriptyline      Other reaction(s): Alopecia  Other reaction(s): Other, see comments  Hairloss, increased blood pressure per patient    Amoxicillin Rash     And itching     Clindamycin Rash     Airway impermeant     Gabapentin Rash     I have seen this patient with the resident and formulated a plan and agree with this note.  AMP

## 2025-02-11 NOTE — PATIENT INSTRUCTIONS
You were seen today with Dr. Cardoso.    Next steps:  Call [facility] to schedule consult there      How to Contact Us  Send a [employer] message to your provider.   Call the clinic - your call will be routed appropriately.   Neurosurgery Clinic: [phone]  To speak directly to an RN Care Coordinator:  Darlene RN: [phone]    Note: We do our best to check voicemail frequently throughout the day and make every effort to return calls within 1-2 business days. For urgent matters, please use the general clinic phone numbers listed above.

## 2025-06-21 ENCOUNTER — HEALTH MAINTENANCE LETTER (OUTPATIENT)
Age: 55
End: 2025-06-21

## 2025-07-12 ENCOUNTER — HEALTH MAINTENANCE LETTER (OUTPATIENT)
Age: 55
End: 2025-07-12

## (undated) DEVICE — PREP DURAPREP REMOVER 4OZ 8611

## (undated) DEVICE — PREP POVIDONE IODINE USP 10% TOPICAL SOL 64537161

## (undated) DEVICE — ESU GROUND PAD ADULT W/CORD E7507

## (undated) DEVICE — LINEN DRAPE 54X72" 5467

## (undated) DEVICE — PREP POVIDONE IODINE SOLUTION 10% 120ML

## (undated) DEVICE — DRAPE C-ARMOR 5 SIDED 5523

## (undated) DEVICE — SU MONOCRYL 4-0 PS-2 18" UND Y496G

## (undated) DEVICE — Device

## (undated) DEVICE — SOL NACL 0.9% 10ML VIAL 0409-4888-02

## (undated) DEVICE — LABEL MEDICATION SYSTEM 3303-P

## (undated) DEVICE — DRAPE U SPLIT 74X120" 29440

## (undated) DEVICE — SUCTION MANIFOLD NEPTUNE 2 SYS 1 PORT 702-025-000

## (undated) DEVICE — PACK MINOR CUSTOM ASC

## (undated) DEVICE — PREP DURAPREP 26ML APL 8630

## (undated) DEVICE — NDL SPINAL 22GA 5" QUINCKE 405148

## (undated) DEVICE — LINEN TOWEL PACK X5 5464

## (undated) DEVICE — DRSG PRIMAPORE 02X3" 7133

## (undated) DEVICE — NDL 18GAX1.5" 305185

## (undated) DEVICE — BLADE KNIFE SURG 11 371111

## (undated) DEVICE — PREP CHLORAPREP 26ML TINTED ORANGE  260815

## (undated) DEVICE — STIMULATOR CHARGER NEUROSTIM INTELLIS 97755

## (undated) DEVICE — DRAPE BACK TABLE  44X90" 8377

## (undated) DEVICE — SU VICRYL 2-0 CT-1 CR 8X18" UND J839D

## (undated) DEVICE — SUCTION TIP YANKAUER STR K87

## (undated) DEVICE — NDL COUNTER 20CT 31142493

## (undated) DEVICE — GOWN REINFORCED XXLG 9071

## (undated) DEVICE — SOL NACL 0.9% INJ 1000ML BAG 2B1324X

## (undated) DEVICE — SYR 05ML GLASS PORTEX PULSATOR LF 4904

## (undated) DEVICE — DRSG AQUACEL AG 3.5X6.0" HYDROFIBER 412010

## (undated) DEVICE — DRSG PRIMAPORE 03 1/8X6" 66000318

## (undated) DEVICE — ADH LIQUID MASTISOL TOPICAL VIAL 2-3ML 0523-48

## (undated) DEVICE — PROCLAIM PATIENT CONTROLLER

## (undated) DEVICE — SOL NACL 0.9% IRRIG 500ML BOTTLE 2F7123

## (undated) DEVICE — SU VICRYL 3-0 SH 27" UND J416H

## (undated) DEVICE — DRSG STERI STRIP 1/2X4" R1547

## (undated) DEVICE — SYR 05ML LL W/O NDL

## (undated) DEVICE — SU SILK 2-0 SH 30" K833H

## (undated) DEVICE — SYR 10ML LL W/O NDL

## (undated) DEVICE — NDL SPINAL 25GA 3" QUINCKE 405170

## (undated) DEVICE — SU VICRYL 3-0 SH 8X18" UND J864D

## (undated) DEVICE — DRAPE IOBAN INCISE 23X17" 6650EZ

## (undated) DEVICE — NDL SPINAL 22GA 3.5" QUINCKE 405181

## (undated) DEVICE — GLOVE PROTEXIS W/NEU-THERA 7.5  2D73TE75

## (undated) DEVICE — TUBING IV EXT SET MICRO VOLUME MALE LL ADAPTER 36" 2N3345

## (undated) DEVICE — SYR EAR BULB 3OZ 0035830

## (undated) DEVICE — DRAPE STERI TOWEL LG 1010

## (undated) DEVICE — CONTROLLER

## (undated) DEVICE — GLOVE PROTEXIS BLUE W/NEU-THERA 7.5  2D73EB75

## (undated) DEVICE — SU VICRYL 3-0 SH 27" J316H

## (undated) DEVICE — GLOVE ESTEEM POWDER FREE 7.0  2D72PL70

## (undated) DEVICE — DRSG GAUZE 4X4" 2187

## (undated) DEVICE — REMOTE SLEEP INSPIRE 3032

## (undated) DEVICE — GLOVE ESTEEM POWDER FREE SMT 7.5  2D72PT75

## (undated) DEVICE — SYR 03ML LL W/O NDL 309657

## (undated) DEVICE — PACK ENT MINOR CUSTOM ASC

## (undated) DEVICE — SU VICRYL 2-0 CT-2 27" UND J269H

## (undated) DEVICE — NDL BLUNT 18GA 1" W/O FILTER 305181

## (undated) DEVICE — DRAPE LAP W/ARMBOARD 29410

## (undated) DEVICE — PACK NEURO MINOR UMMC SNE32MNMU4

## (undated) DEVICE — SYR 30ML LL W/O NDL 302832

## (undated) DEVICE — ADH SKIN CLOSURE PREMIERPRO EXOFIN 1.0ML 3470

## (undated) DEVICE — NDL 25GA 1.5" 305127

## (undated) DEVICE — LIGHT HANDLE X1 31140133

## (undated) DEVICE — DRAPE C-ARM W/STRAPS 42X72" 07-CA104

## (undated) DEVICE — PREP SKIN SCRUB TRAY 4461A

## (undated) DEVICE — SOL WATER IRRIG 1000ML BOTTLE 07139-09

## (undated) DEVICE — SU ETHIBOND 2-0 CT-2 CR 8X18" CX26D

## (undated) DEVICE — SU SILK 0 SH 30" K834H

## (undated) DEVICE — DRSG TEGADERM 4X4 3/4" 1626W

## (undated) RX ORDER — VANCOMYCIN HYDROCHLORIDE 500 MG/10ML
INJECTION, POWDER, LYOPHILIZED, FOR SOLUTION INTRAVENOUS
Status: DISPENSED
Start: 2018-07-02

## (undated) RX ORDER — REMIFENTANIL HYDROCHLORIDE 1 MG/ML
INJECTION, POWDER, LYOPHILIZED, FOR SOLUTION INTRAVENOUS
Status: DISPENSED
Start: 2021-04-28

## (undated) RX ORDER — LIDOCAINE HYDROCHLORIDE 20 MG/ML
INJECTION, SOLUTION EPIDURAL; INFILTRATION; INTRACAUDAL; PERINEURAL
Status: DISPENSED
Start: 2018-07-02

## (undated) RX ORDER — LIDOCAINE HYDROCHLORIDE 20 MG/ML
INJECTION, SOLUTION EPIDURAL; INFILTRATION; INTRACAUDAL; PERINEURAL
Status: DISPENSED
Start: 2018-05-11

## (undated) RX ORDER — CEFAZOLIN SODIUM 1 G/3ML
INJECTION, POWDER, FOR SOLUTION INTRAMUSCULAR; INTRAVENOUS
Status: DISPENSED
Start: 2018-07-02

## (undated) RX ORDER — ACETAMINOPHEN 325 MG/1
TABLET ORAL
Status: DISPENSED
Start: 2021-04-28

## (undated) RX ORDER — FENTANYL CITRATE 50 UG/ML
INJECTION, SOLUTION INTRAMUSCULAR; INTRAVENOUS
Status: DISPENSED
Start: 2021-04-28

## (undated) RX ORDER — PROPOFOL 10 MG/ML
INJECTION, EMULSION INTRAVENOUS
Status: DISPENSED
Start: 2021-04-28

## (undated) RX ORDER — OXYCODONE AND ACETAMINOPHEN 5; 325 MG/1; MG/1
TABLET ORAL
Status: DISPENSED
Start: 2021-04-28

## (undated) RX ORDER — PROPOFOL 10 MG/ML
INJECTION, EMULSION INTRAVENOUS
Status: DISPENSED
Start: 2018-07-02

## (undated) RX ORDER — LIDOCAINE HYDROCHLORIDE 20 MG/ML
INJECTION, SOLUTION EPIDURAL; INFILTRATION; INTRACAUDAL; PERINEURAL
Status: DISPENSED
Start: 2021-04-28

## (undated) RX ORDER — KETOROLAC TROMETHAMINE 30 MG/ML
INJECTION, SOLUTION INTRAMUSCULAR; INTRAVENOUS
Status: DISPENSED
Start: 2021-04-28

## (undated) RX ORDER — FENTANYL CITRATE 50 UG/ML
INJECTION, SOLUTION INTRAMUSCULAR; INTRAVENOUS
Status: DISPENSED
Start: 2018-05-11

## (undated) RX ORDER — KETAMINE HYDROCHLORIDE 10 MG/ML
INJECTION INTRAMUSCULAR; INTRAVENOUS
Status: DISPENSED
Start: 2018-07-02

## (undated) RX ORDER — GLYCOPYRROLATE 0.2 MG/ML
INJECTION INTRAMUSCULAR; INTRAVENOUS
Status: DISPENSED
Start: 2021-04-28

## (undated) RX ORDER — FENTANYL CITRATE 50 UG/ML
INJECTION, SOLUTION INTRAMUSCULAR; INTRAVENOUS
Status: DISPENSED
Start: 2017-12-29

## (undated) RX ORDER — GLYCOPYRROLATE 0.2 MG/ML
INJECTION INTRAMUSCULAR; INTRAVENOUS
Status: DISPENSED
Start: 2018-07-02

## (undated) RX ORDER — DEXAMETHASONE SODIUM PHOSPHATE 4 MG/ML
INJECTION, SOLUTION INTRA-ARTICULAR; INTRALESIONAL; INTRAMUSCULAR; INTRAVENOUS; SOFT TISSUE
Status: DISPENSED
Start: 2021-04-28

## (undated) RX ORDER — BUPIVACAINE HYDROCHLORIDE AND EPINEPHRINE 5; 5 MG/ML; UG/ML
INJECTION, SOLUTION EPIDURAL; INTRACAUDAL; PERINEURAL
Status: DISPENSED
Start: 2018-05-11

## (undated) RX ORDER — VANCOMYCIN HYDROCHLORIDE 1 G/20ML
INJECTION, POWDER, LYOPHILIZED, FOR SOLUTION INTRAVENOUS
Status: DISPENSED
Start: 2021-04-28

## (undated) RX ORDER — BUPIVACAINE HYDROCHLORIDE AND EPINEPHRINE 5; 5 MG/ML; UG/ML
INJECTION, SOLUTION EPIDURAL; INTRACAUDAL; PERINEURAL
Status: DISPENSED
Start: 2021-04-28

## (undated) RX ORDER — ACETAMINOPHEN 325 MG/1
TABLET ORAL
Status: DISPENSED
Start: 2018-07-02

## (undated) RX ORDER — BUPIVACAINE HYDROCHLORIDE 5 MG/ML
INJECTION, SOLUTION EPIDURAL; INTRACAUDAL
Status: DISPENSED
Start: 2018-07-02

## (undated) RX ORDER — EPINEPHRINE 1 MG/ML
INJECTION, SOLUTION, CONCENTRATE INTRAVENOUS
Status: DISPENSED
Start: 2018-07-02

## (undated) RX ORDER — FENTANYL CITRATE 50 UG/ML
INJECTION, SOLUTION INTRAMUSCULAR; INTRAVENOUS
Status: DISPENSED
Start: 2018-07-02

## (undated) RX ORDER — ONDANSETRON 2 MG/ML
INJECTION INTRAMUSCULAR; INTRAVENOUS
Status: DISPENSED
Start: 2018-07-02

## (undated) RX ORDER — BUPIVACAINE HYDROCHLORIDE 5 MG/ML
INJECTION, SOLUTION EPIDURAL; INTRACAUDAL
Status: DISPENSED
Start: 2021-04-28

## (undated) RX ORDER — ONDANSETRON 2 MG/ML
INJECTION INTRAMUSCULAR; INTRAVENOUS
Status: DISPENSED
Start: 2018-05-11

## (undated) RX ORDER — DEXAMETHASONE SODIUM PHOSPHATE 4 MG/ML
INJECTION, SOLUTION INTRA-ARTICULAR; INTRALESIONAL; INTRAMUSCULAR; INTRAVENOUS; SOFT TISSUE
Status: DISPENSED
Start: 2018-07-02

## (undated) RX ORDER — ONDANSETRON 2 MG/ML
INJECTION INTRAMUSCULAR; INTRAVENOUS
Status: DISPENSED
Start: 2021-04-28

## (undated) RX ORDER — DIPHENHYDRAMINE HYDROCHLORIDE 50 MG/ML
INJECTION INTRAMUSCULAR; INTRAVENOUS
Status: DISPENSED
Start: 2018-07-02

## (undated) RX ORDER — PROPOFOL 10 MG/ML
INJECTION, EMULSION INTRAVENOUS
Status: DISPENSED
Start: 2018-05-11